# Patient Record
Sex: FEMALE | Race: WHITE | NOT HISPANIC OR LATINO | Employment: OTHER | URBAN - METROPOLITAN AREA
[De-identification: names, ages, dates, MRNs, and addresses within clinical notes are randomized per-mention and may not be internally consistent; named-entity substitution may affect disease eponyms.]

---

## 2020-05-13 ENCOUNTER — TELEPHONE (OUTPATIENT)
Dept: OTHER | Facility: OTHER | Age: 70
End: 2020-05-13

## 2020-07-08 ENCOUNTER — NURSING HOME VISIT (OUTPATIENT)
Dept: FAMILY MEDICINE CLINIC | Facility: CLINIC | Age: 70
End: 2020-07-08
Payer: MEDICARE

## 2020-07-08 DIAGNOSIS — D50.9 IRON DEFICIENCY ANEMIA, UNSPECIFIED IRON DEFICIENCY ANEMIA TYPE: ICD-10-CM

## 2020-07-08 DIAGNOSIS — E53.8 VITAMIN B12 DEFICIENCY: ICD-10-CM

## 2020-07-08 DIAGNOSIS — L03.119 RECURRENT CELLULITIS OF LOWER EXTREMITY: ICD-10-CM

## 2020-07-08 DIAGNOSIS — E55.9 VITAMIN D DEFICIENCY: ICD-10-CM

## 2020-07-08 DIAGNOSIS — L97.929 CHRONIC VENOUS HYPERTENSION (IDIOPATHIC) WITH ULCER OF BILATERAL LOWER EXTREMITY (HCC): ICD-10-CM

## 2020-07-08 DIAGNOSIS — L97.919 CHRONIC VENOUS HYPERTENSION (IDIOPATHIC) WITH ULCER OF BILATERAL LOWER EXTREMITY (HCC): ICD-10-CM

## 2020-07-08 DIAGNOSIS — F41.9 ANXIETY: ICD-10-CM

## 2020-07-08 DIAGNOSIS — R60.9 EDEMA, UNSPECIFIED TYPE: ICD-10-CM

## 2020-07-08 DIAGNOSIS — I87.313 CHRONIC VENOUS HYPERTENSION (IDIOPATHIC) WITH ULCER OF BILATERAL LOWER EXTREMITY (HCC): ICD-10-CM

## 2020-07-08 DIAGNOSIS — F32.A DEPRESSION, UNSPECIFIED DEPRESSION TYPE: ICD-10-CM

## 2020-07-08 DIAGNOSIS — I95.0 IDIOPATHIC HYPOTENSION: ICD-10-CM

## 2020-07-08 DIAGNOSIS — A41.9 SEPSIS, DUE TO UNSPECIFIED ORGANISM, UNSPECIFIED WHETHER ACUTE ORGAN DYSFUNCTION PRESENT (HCC): Primary | ICD-10-CM

## 2020-07-08 PROCEDURE — 99316 NF DSCHRG MGMT 30 MIN+: CPT | Performed by: NURSE PRACTITIONER

## 2020-07-10 VITALS
HEART RATE: 70 BPM | RESPIRATION RATE: 18 BRPM | WEIGHT: 144 LBS | DIASTOLIC BLOOD PRESSURE: 74 MMHG | TEMPERATURE: 97.6 F | OXYGEN SATURATION: 96 % | SYSTOLIC BLOOD PRESSURE: 116 MMHG

## 2020-07-10 PROBLEM — I95.0 IDIOPATHIC HYPOTENSION: Status: ACTIVE | Noted: 2020-07-10

## 2020-07-10 PROBLEM — I83.029 VENOUS ULCER-LEG SYNDROME, BILATERAL (HCC): Status: ACTIVE | Noted: 2020-07-10

## 2020-07-10 PROBLEM — E87.6 HYPOKALEMIA: Status: ACTIVE | Noted: 2020-07-10

## 2020-07-10 PROBLEM — L03.119 RECURRENT CELLULITIS OF LOWER EXTREMITY: Status: ACTIVE | Noted: 2020-07-10

## 2020-07-10 PROBLEM — R60.9 EDEMA: Status: ACTIVE | Noted: 2020-07-10

## 2020-07-10 PROBLEM — A41.9 SEPSIS (HCC): Status: RESOLVED | Noted: 2020-07-10 | Resolved: 2020-07-10

## 2020-07-10 PROBLEM — I87.313 CHRONIC VENOUS HYPERTENSION (IDIOPATHIC) WITH ULCER OF BILATERAL LOWER EXTREMITY (HCC): Status: ACTIVE | Noted: 2020-07-10

## 2020-07-10 PROBLEM — A41.9 SEPSIS (HCC): Status: ACTIVE | Noted: 2020-07-10

## 2020-07-10 PROBLEM — D64.9 ANEMIA: Status: ACTIVE | Noted: 2020-07-10

## 2020-07-10 PROBLEM — E53.8 VITAMIN B12 DEFICIENCY: Status: ACTIVE | Noted: 2020-07-10

## 2020-07-10 PROBLEM — L97.929 VENOUS ULCER-LEG SYNDROME, BILATERAL (HCC): Status: ACTIVE | Noted: 2020-07-10

## 2020-07-10 PROBLEM — E55.9 VITAMIN D DEFICIENCY: Status: ACTIVE | Noted: 2020-07-10

## 2020-07-10 PROBLEM — E87.6 HYPOKALEMIA: Status: RESOLVED | Noted: 2020-07-10 | Resolved: 2020-07-10

## 2020-07-10 PROBLEM — F41.9 ANXIETY: Status: ACTIVE | Noted: 2020-07-10

## 2020-07-10 PROBLEM — I89.0 LYMPHEDEMA: Status: ACTIVE | Noted: 2020-07-10

## 2020-07-10 PROBLEM — L97.919 VENOUS ULCER-LEG SYNDROME, BILATERAL (HCC): Status: ACTIVE | Noted: 2020-07-10

## 2020-07-10 PROBLEM — I83.019 VENOUS ULCER-LEG SYNDROME, BILATERAL (HCC): Status: ACTIVE | Noted: 2020-07-10

## 2020-07-10 PROBLEM — L03.90 CELLULITIS: Status: ACTIVE | Noted: 2020-07-10

## 2020-07-10 PROBLEM — F32.A DEPRESSION: Status: ACTIVE | Noted: 2020-07-10

## 2020-07-10 NOTE — ASSESSMENT & PLAN NOTE
Patient remains on Lasix 40 mg in a m  And 20 mg in p m  in addition to potassium chloride 20 mEq b i d

## 2020-07-10 NOTE — ASSESSMENT & PLAN NOTE
Patient reports occasional dizziness  Counseled patient on getting up from bed and making sure she uses assistance  Patient will have nursing care at home    Continue midrodrine 10 mg t i d

## 2020-07-10 NOTE — PROGRESS NOTES
[facility] [address]  [phone]   Fax: [phone]     PROGRESS NOTE  Name: Deborah Zacarias  AGE: 68 y o  SEX: female    MRN: [id number]    DATE OF ENCOUNTER: 7/08/20    Patient Location   [address] and [address]  Reason For Visit     Discharge    Patients care was coordinated with nursing facility staff  Recent vitals, labs and [facility]  Past Medical, surgical, social, medication and allergy history and patients previous records reviewed  1  Sepsis, due to unspecified organism, unspecified whether acute organ dysfunction present [facility])  Assessment & Plan:  White blood cell on 06/16/2020 was 7 6  Vital signs are stable  Patient remains afebrile  2  Idiopathic hypotension  Assessment & Plan:  Patient reports occasional dizziness  Counseled patient on getting up from bed and making sure she uses assistance  Patient will have nursing care at home  Continue midrodrine 10 mg t i d       3  Depression, unspecified depression type  Assessment & Plan:  Patient in a pleasant mood today  No behavioral issues have been reported  Patient remains on Lexapro 10 mg daily  4  Iron deficiency anemia, unspecified iron deficiency anemia type  Assessment & Plan:  Last hemoglobin on 06/16/2020 was 8 3  Patient remains on ferrous sulfate 325 mg b i d  5  Anxiety  Assessment & Plan:  No behavioral issues reported  Patient remains on Ativan 0 5 mg t i d  And buspirone 7 5 mg t i d       6  Edema, unspecified type  Assessment & Plan:  Patient remains on Lasix 40 mg in a m  And 20 mg in p m  in addition to potassium chloride 20 mEq b i d       7  Vitamin D deficiency  Assessment & Plan:  Patient remains on cholecalciferol 1000 units 2 tablets daily        8  Vitamin B12 deficiency  Assessment & Plan:  Patient remains on vitamin B12 1000 mcg IM monthly      9  Recurrent cellulitis of lower extremity    10  Chronic venous hypertension (idiopathic) with ulcer of bilateral lower extremity (HCC)  Assessment & Plan:  Chronic condition  Wound care has been given in nursing facility  Continue wound care at home  HPI      Mrs Grecia Cevallos is a 68 y o  female who is being seen for discharge today  Patient was previously Dr Young Kaba but currently assigned to our practice today  Patient has a past medical history of recurrent cellulitis, lymphedema, aortic stenosis, anxiety, obesity status post gastric bypass, depression  As per staff, she has had many visits to the facility in the past   She had been previous hospitalized on 05/04/2020 and was treated for sepsis, cellulitis, hypokalemia, AMADOU  In hospital she was treated with antibiotics, fluid resuscitation, and 1 unit of blood  Patient was discharged to [facility] for rehabilitation  During stay in facility it was noted that she had hypokalemia for which Dr Young Kaba ordered potassium chloride with repeat labs  Repeat attached was normal   According to records patient has been receiving PT and OT daily and has progressed  On examination today she appears pleasant, and is  in acute no acute distress, and her vital signs are stable  She is going home today and is to follow-up with PCP, and vascular physician  Review of Systems   Constitutional: Positive for fatigue  Negative for chills and fever  HENT: Negative for nosebleeds and rhinorrhea  Eyes: Negative for discharge and redness  Respiratory: Negative for cough, chest tightness, shortness of breath, wheezing and stridor  Cardiovascular: Negative for chest pain and leg swelling  Gastrointestinal: Negative for abdominal distention, abdominal pain, diarrhea and vomiting  Genitourinary: Negative for dysuria, flank pain and hematuria  Musculoskeletal: Positive for gait problem  Negative for arthralgias and back pain  Skin: Negative for pallor  Neurological: Positive for weakness (Generalized)  Negative for tremors, seizures, syncope and headaches  Psychiatric/Behavioral: Negative for agitation, behavioral problems and confusion  Past Medical History:   Diagnosis Date    Anemia     Anxiety     Hypotension        No past surgical history on file  Social History     Social History     Tobacco Use   Smoking Status Former Smoker   Smokeless Tobacco Never Used       Social History     Substance and Sexual Activity   Alcohol Use Not on file        No family history on file  Allergies   Allergen Reactions    Aspirin     Cephalosporins      Has tolerated cefepime    Penicillins Other (See Comments)     Unknown reaction during childhood   Valium [Diazepam]     Ciprofloxacin Rash    Sulfa Antibiotics Rash     Itching, rash    Vancomycin Rash       Medications     No current facility-administered medications for this visit  No current outpatient medications on file      Facility-Administered Medications Ordered in Other Visits:     acetaminophen (TYLENOL) tablet 650 mg, 650 mg, Oral, Q6H PRN, Raegan Sky MD, 650 mg at 07/29/20 2136    buPROPion (WELLBUTRIN XL) 24 hr tablet 300 mg, 300 mg, Oral, Daily, Raegan Sky MD, 300 mg at 07/29/20 1810    busPIRone (BUSPAR) tablet 15 mg, 15 mg, Oral, BID, Raegan Sky MD, 15 mg at 07/30/20 0846    cefepime (MAXIPIME) IVPB (premix) 1,000 mg 50 mL, 1,000 mg, Intravenous, Q12H, Raegan Sky MD, Last Rate: 100 mL/hr at 07/30/20 0848, 1,000 mg at 07/30/20 0848    cholecalciferol (VITAMIN D3) tablet 2,000 Units, 2,000 Units, Oral, Daily, Raegan Sky MD, 2,000 Units at 07/30/20 0846    cyclobenzaprine (FLEXERIL) tablet 5 mg, 5 mg, Oral, TID PRN, Raegan Sky MD, 5 mg at 07/26/20 2159    diphenhydrAMINE (BENADRYL) injection 25 mg, 25 mg, Intravenous, Q6H PRN, Raegan Sky MD, 25 mg at 07/29/20 0626    escitalopram (LEXAPRO) tablet 10 mg, 10 mg, Oral, Daily, Shari Jimenez MD, 10 mg at 07/30/20 0846    ferrous sulfate tablet 325 mg, 325 mg, Oral, Daily With Breakfast, Shari Jimenez MD, 325 mg at 07/30/20 0846    heparin (porcine) subcutaneous injection 5,000 Units, 5,000 Units, Subcutaneous, Q8H [facility], 5,000 Units at 07/30/20 0533 **AND** Platelet count, , , Once, Shari Jimenez MD    lactated ringers infusion, 75 mL/hr, Intravenous, Continuous, Imani Beltrán MD, Last Rate: 75 mL/hr at 07/30/20 0155, 75 mL/hr at 07/30/20 0155    lidocaine (LIDODERM) 5 % patch 1 patch, 1 patch, Topical, Daily, Edgardo Maurer MD, 1 patch at 07/30/20 0847    midodrine (PROAMATINE) tablet 10 mg, 10 mg, Oral, TID AC, Shari Jimenez MD, 10 mg at 07/30/20 0846    multivitamin-minerals (CENTRUM) tablet 1 tablet, 1 tablet, Oral, Daily, Shari Jimenez MD, 1 tablet at 07/30/20 0846    oxyCODONE (ROXICODONE) IR tablet 5 mg, 5 mg, Oral, Q6H PRN, Imani Beltrán MD, 5 mg at 07/30/20 0847    pravastatin (PRAVACHOL) tablet 20 mg, 20 mg, Oral, Daily With Duy Christian MD, 20 mg at 07/29/20 1549    Updated list was reviewed in [address] [place] [address] facility  Vitals:    07/08/20 1046   BP: 116/74   Pulse: 70   Resp: 18   Temp: 97 6 °F (36 4 °C)   SpO2: 96%       Physical Exam   Constitutional: She is oriented to person, place, and time  She appears well-developed and well-nourished  No distress  HENT:   Head: Normocephalic and atraumatic  Eyes: Right eye exhibits no discharge  Left eye exhibits no discharge  No scleral icterus  Cardiovascular: Normal rate, regular rhythm and normal heart sounds  Pulmonary/Chest: Effort normal and breath sounds normal  No stridor  No respiratory distress  She has no wheezes  She has no rales  Abdominal: Soft  Bowel sounds are normal  She exhibits no distension  There is no tenderness  There is no guarding     Musculoskeletal: She exhibits no edema or deformity  Neurological: She is alert and oriented to person, place, and time  No focal neurological symptoms noted  Skin: Skin is warm and dry  No rash noted  She is not diaphoretic  Psychiatric: She has a normal mood and affect  Her behavior is normal  Judgment and thought content normal        Diagnostic Data     Recent labs were reviewed  · 07/08/2020 WBC 12, potassium 3 7      Additional Notes     · Care coordination with staff and family  Patient sent home with a medication box  · Greater than 35 minutes spent on assessing patient, reviewing records, care coordination with staff      This was electronically signed by ROSCOE Mcneal

## 2020-07-10 NOTE — ASSESSMENT & PLAN NOTE
Patient in a pleasant mood today  No behavioral issues have been reported  Patient remains on Lexapro 10 mg daily

## 2020-07-26 ENCOUNTER — HOSPITAL ENCOUNTER (INPATIENT)
Facility: HOSPITAL | Age: 70
LOS: 4 days | Discharge: NURSING, REHAB OR LONG-TERM CARE | DRG: 683 | End: 2020-07-30
Attending: EMERGENCY MEDICINE | Admitting: INTERNAL MEDICINE
Payer: MEDICARE

## 2020-07-26 DIAGNOSIS — L03.119 RECURRENT CELLULITIS OF LOWER EXTREMITY: ICD-10-CM

## 2020-07-26 DIAGNOSIS — L03.119 CELLULITIS OF LOWER EXTREMITY, UNSPECIFIED LATERALITY: Primary | ICD-10-CM

## 2020-07-26 DIAGNOSIS — F41.9 ANXIETY: ICD-10-CM

## 2020-07-26 DIAGNOSIS — F32.A DEPRESSION, UNSPECIFIED DEPRESSION TYPE: ICD-10-CM

## 2020-07-26 PROBLEM — N17.9 AKI (ACUTE KIDNEY INJURY) (HCC): Status: ACTIVE | Noted: 2020-07-26

## 2020-07-26 LAB
ANION GAP SERPL CALCULATED.3IONS-SCNC: 8 MMOL/L (ref 4–13)
APTT PPP: 31 SECONDS (ref 23–31)
BASOPHILS # BLD AUTO: 0.03 THOUSANDS/ΜL (ref 0–0.1)
BASOPHILS NFR BLD AUTO: 0 % (ref 0–1)
BNP SERPL-MCNC: 71.8 PG/ML (ref 1–100)
BUN SERPL-MCNC: 41 MG/DL (ref 6–20)
CALCIUM SERPL-MCNC: 9.3 MG/DL (ref 8.4–10.2)
CHLORIDE SERPL-SCNC: 106 MMOL/L (ref 96–108)
CO2 SERPL-SCNC: 22 MMOL/L (ref 22–33)
CREAT SERPL-MCNC: 1.39 MG/DL (ref 0.4–1.1)
EOSINOPHIL # BLD AUTO: 0.31 THOUSAND/ΜL (ref 0–0.61)
EOSINOPHIL NFR BLD AUTO: 2 % (ref 0–6)
ERYTHROCYTE [DISTWIDTH] IN BLOOD BY AUTOMATED COUNT: 14.5 % (ref 11.6–15.1)
GFR SERPL CREATININE-BSD FRML MDRD: 38 ML/MIN/1.73SQ M
GLUCOSE SERPL-MCNC: 79 MG/DL (ref 65–140)
HCT VFR BLD AUTO: 28.6 % (ref 34.8–46.1)
HGB BLD-MCNC: 9 G/DL (ref 11.5–15.4)
IMM GRANULOCYTES # BLD AUTO: 0.03 THOUSAND/UL (ref 0–0.2)
IMM GRANULOCYTES NFR BLD AUTO: 0 % (ref 0–2)
INR PPP: 0.97 (ref 0.9–1.1)
LYMPHOCYTES # BLD AUTO: 2.32 THOUSANDS/ΜL (ref 0.6–4.47)
LYMPHOCYTES NFR BLD AUTO: 18 % (ref 14–44)
MCH RBC QN AUTO: 28.2 PG (ref 26.8–34.3)
MCHC RBC AUTO-ENTMCNC: 31.5 G/DL (ref 31.4–37.4)
MCV RBC AUTO: 90 FL (ref 82–98)
MONOCYTES # BLD AUTO: 0.73 THOUSAND/ΜL (ref 0.17–1.22)
MONOCYTES NFR BLD AUTO: 6 % (ref 4–12)
NEUTROPHILS # BLD AUTO: 9.38 THOUSANDS/ΜL (ref 1.85–7.62)
NEUTS SEG NFR BLD AUTO: 74 % (ref 43–75)
PLATELET # BLD AUTO: 346 THOUSANDS/UL (ref 149–390)
PMV BLD AUTO: 10.3 FL (ref 8.9–12.7)
POTASSIUM SERPL-SCNC: 4 MMOL/L (ref 3.5–5)
PROTHROMBIN TIME: 10.3 SECONDS (ref 9.5–12.1)
RBC # BLD AUTO: 3.19 MILLION/UL (ref 3.81–5.12)
SODIUM SERPL-SCNC: 136 MMOL/L (ref 133–145)
TROPONIN I SERPL-MCNC: <0.03 NG/ML (ref 0–0.07)
WBC # BLD AUTO: 12.8 THOUSAND/UL (ref 4.31–10.16)

## 2020-07-26 PROCEDURE — 85610 PROTHROMBIN TIME: CPT | Performed by: EMERGENCY MEDICINE

## 2020-07-26 PROCEDURE — 85025 COMPLETE CBC W/AUTO DIFF WBC: CPT | Performed by: EMERGENCY MEDICINE

## 2020-07-26 PROCEDURE — 99284 EMERGENCY DEPT VISIT MOD MDM: CPT | Performed by: EMERGENCY MEDICINE

## 2020-07-26 PROCEDURE — 99223 1ST HOSP IP/OBS HIGH 75: CPT | Performed by: INTERNAL MEDICINE

## 2020-07-26 PROCEDURE — 84484 ASSAY OF TROPONIN QUANT: CPT | Performed by: EMERGENCY MEDICINE

## 2020-07-26 PROCEDURE — 85730 THROMBOPLASTIN TIME PARTIAL: CPT | Performed by: EMERGENCY MEDICINE

## 2020-07-26 PROCEDURE — 1124F ACP DISCUSS-NO DSCNMKR DOCD: CPT | Performed by: INTERNAL MEDICINE

## 2020-07-26 PROCEDURE — 87040 BLOOD CULTURE FOR BACTERIA: CPT | Performed by: EMERGENCY MEDICINE

## 2020-07-26 PROCEDURE — 99284 EMERGENCY DEPT VISIT MOD MDM: CPT

## 2020-07-26 PROCEDURE — 83880 ASSAY OF NATRIURETIC PEPTIDE: CPT | Performed by: EMERGENCY MEDICINE

## 2020-07-26 PROCEDURE — 36415 COLL VENOUS BLD VENIPUNCTURE: CPT | Performed by: EMERGENCY MEDICINE

## 2020-07-26 PROCEDURE — 80048 BASIC METABOLIC PNL TOTAL CA: CPT | Performed by: EMERGENCY MEDICINE

## 2020-07-26 RX ORDER — CYCLOBENZAPRINE HCL 10 MG
5 TABLET ORAL 3 TIMES DAILY PRN
Status: DISCONTINUED | OUTPATIENT
Start: 2020-07-26 | End: 2020-07-30 | Stop reason: HOSPADM

## 2020-07-26 RX ORDER — BUSPIRONE HYDROCHLORIDE 5 MG/1
15 TABLET ORAL 2 TIMES DAILY
Status: DISCONTINUED | OUTPATIENT
Start: 2020-07-26 | End: 2020-07-30 | Stop reason: HOSPADM

## 2020-07-26 RX ORDER — DIPHENOXYLATE HYDROCHLORIDE AND ATROPINE SULFATE 2.5; .025 MG/1; MG/1
1 TABLET ORAL DAILY
COMMUNITY

## 2020-07-26 RX ORDER — FUROSEMIDE 20 MG/1
20 TABLET ORAL
COMMUNITY
End: 2020-07-30 | Stop reason: HOSPADM

## 2020-07-26 RX ORDER — FERROUS SULFATE 325(65) MG
650 TABLET ORAL
COMMUNITY
End: 2020-08-18 | Stop reason: SDUPTHER

## 2020-07-26 RX ORDER — HEPARIN SODIUM 5000 [USP'U]/ML
5000 INJECTION, SOLUTION INTRAVENOUS; SUBCUTANEOUS EVERY 8 HOURS SCHEDULED
Status: DISCONTINUED | OUTPATIENT
Start: 2020-07-26 | End: 2020-07-30 | Stop reason: HOSPADM

## 2020-07-26 RX ORDER — BUPROPION HYDROCHLORIDE 150 MG/1
150 TABLET, EXTENDED RELEASE ORAL 2 TIMES DAILY
Status: DISCONTINUED | OUTPATIENT
Start: 2020-07-26 | End: 2020-07-27 | Stop reason: CLARIF

## 2020-07-26 RX ORDER — FUROSEMIDE 40 MG/1
40 TABLET ORAL EVERY MORNING
COMMUNITY
End: 2020-07-30 | Stop reason: HOSPADM

## 2020-07-26 RX ORDER — BUPROPION HYDROCHLORIDE 150 MG/1
300 TABLET, EXTENDED RELEASE ORAL DAILY
COMMUNITY
End: 2020-08-18 | Stop reason: SDUPTHER

## 2020-07-26 RX ORDER — MELATONIN
2000 DAILY
Status: DISCONTINUED | OUTPATIENT
Start: 2020-07-27 | End: 2020-07-30 | Stop reason: HOSPADM

## 2020-07-26 RX ORDER — PRAVASTATIN SODIUM 20 MG
20 TABLET ORAL
Status: DISCONTINUED | OUTPATIENT
Start: 2020-07-26 | End: 2020-07-30 | Stop reason: HOSPADM

## 2020-07-26 RX ORDER — CYANOCOBALAMIN 1000 UG/ML
1000 INJECTION INTRAMUSCULAR; SUBCUTANEOUS ONCE
Status: COMPLETED | OUTPATIENT
Start: 2020-07-27 | End: 2020-07-26

## 2020-07-26 RX ORDER — ESCITALOPRAM OXALATE 10 MG/1
10 TABLET ORAL DAILY
COMMUNITY
End: 2020-08-18 | Stop reason: SDUPTHER

## 2020-07-26 RX ORDER — CYCLOBENZAPRINE HCL 5 MG
5 TABLET ORAL 3 TIMES DAILY PRN
COMMUNITY
End: 2020-08-18 | Stop reason: SDUPTHER

## 2020-07-26 RX ORDER — CLINDAMYCIN PHOSPHATE 600 MG/50ML
600 INJECTION INTRAVENOUS ONCE
Status: COMPLETED | OUTPATIENT
Start: 2020-07-26 | End: 2020-07-26

## 2020-07-26 RX ORDER — FERROUS SULFATE 325(65) MG
325 TABLET ORAL
Status: DISCONTINUED | OUTPATIENT
Start: 2020-07-27 | End: 2020-07-30 | Stop reason: HOSPADM

## 2020-07-26 RX ORDER — LOVASTATIN 20 MG/1
20 TABLET ORAL
COMMUNITY
End: 2020-07-31

## 2020-07-26 RX ORDER — METRONIDAZOLE 500 MG/1
500 TABLET ORAL EVERY 8 HOURS SCHEDULED
Status: DISCONTINUED | OUTPATIENT
Start: 2020-07-26 | End: 2020-07-28

## 2020-07-26 RX ORDER — BUSPIRONE HYDROCHLORIDE 15 MG/1
15 TABLET ORAL 3 TIMES DAILY
COMMUNITY
End: 2020-08-18 | Stop reason: SDUPTHER

## 2020-07-26 RX ORDER — MIDODRINE HYDROCHLORIDE 5 MG/1
10 TABLET ORAL
Status: DISCONTINUED | OUTPATIENT
Start: 2020-07-26 | End: 2020-07-30 | Stop reason: HOSPADM

## 2020-07-26 RX ORDER — LIDOCAINE 50 MG/G
2 PATCH TOPICAL ONCE
Status: COMPLETED | OUTPATIENT
Start: 2020-07-26 | End: 2020-07-27

## 2020-07-26 RX ORDER — CEFEPIME HYDROCHLORIDE 1 G/50ML
1000 INJECTION, SOLUTION INTRAVENOUS EVERY 12 HOURS
Status: DISCONTINUED | OUTPATIENT
Start: 2020-07-26 | End: 2020-07-30 | Stop reason: HOSPADM

## 2020-07-26 RX ORDER — SODIUM CHLORIDE, SODIUM LACTATE, POTASSIUM CHLORIDE, CALCIUM CHLORIDE 600; 310; 30; 20 MG/100ML; MG/100ML; MG/100ML; MG/100ML
75 INJECTION, SOLUTION INTRAVENOUS CONTINUOUS
Status: DISCONTINUED | OUTPATIENT
Start: 2020-07-26 | End: 2020-07-30 | Stop reason: HOSPADM

## 2020-07-26 RX ORDER — ESCITALOPRAM OXALATE 10 MG/1
10 TABLET ORAL DAILY
Status: DISCONTINUED | OUTPATIENT
Start: 2020-07-27 | End: 2020-07-30 | Stop reason: HOSPADM

## 2020-07-26 RX ORDER — DIPHENHYDRAMINE HYDROCHLORIDE 50 MG/ML
25 INJECTION INTRAMUSCULAR; INTRAVENOUS EVERY 6 HOURS PRN
Status: DISCONTINUED | OUTPATIENT
Start: 2020-07-26 | End: 2020-07-30 | Stop reason: HOSPADM

## 2020-07-26 RX ORDER — MELATONIN
1000 DAILY
COMMUNITY

## 2020-07-26 RX ORDER — OXYCODONE HYDROCHLORIDE 5 MG/1
5 TABLET ORAL EVERY 6 HOURS PRN
Status: DISCONTINUED | OUTPATIENT
Start: 2020-07-26 | End: 2020-07-30 | Stop reason: HOSPADM

## 2020-07-26 RX ORDER — CEFEPIME HYDROCHLORIDE 2 G/50ML
2000 INJECTION, SOLUTION INTRAVENOUS EVERY 12 HOURS
Status: DISCONTINUED | OUTPATIENT
Start: 2020-07-26 | End: 2020-07-26

## 2020-07-26 RX ORDER — MIDODRINE HYDROCHLORIDE 10 MG/1
TABLET ORAL
COMMUNITY
End: 2020-08-18 | Stop reason: SDUPTHER

## 2020-07-26 RX ORDER — ACETAMINOPHEN 325 MG/1
650 TABLET ORAL EVERY 6 HOURS PRN
Status: DISCONTINUED | OUTPATIENT
Start: 2020-07-26 | End: 2020-07-30 | Stop reason: HOSPADM

## 2020-07-26 RX ADMIN — CLINDAMYCIN IN 5 PERCENT DEXTROSE 600 MG: 12 INJECTION, SOLUTION INTRAVENOUS at 17:38

## 2020-07-26 RX ADMIN — PRAVASTATIN SODIUM 20 MG: 20 TABLET ORAL at 20:04

## 2020-07-26 RX ADMIN — MIDODRINE HYDROCHLORIDE 10 MG: 5 TABLET ORAL at 20:04

## 2020-07-26 RX ADMIN — HEPARIN SODIUM 5000 UNITS: 5000 INJECTION INTRAVENOUS; SUBCUTANEOUS at 21:33

## 2020-07-26 RX ADMIN — BUSPIRONE HYDROCHLORIDE 15 MG: 5 TABLET ORAL at 20:04

## 2020-07-26 RX ADMIN — BUPROPION HYDROCHLORIDE 150 MG: 150 TABLET, FILM COATED, EXTENDED RELEASE ORAL at 22:48

## 2020-07-26 RX ADMIN — SODIUM CHLORIDE, SODIUM LACTATE, POTASSIUM CHLORIDE, AND CALCIUM CHLORIDE 125 ML/HR: .6; .31; .03; .02 INJECTION, SOLUTION INTRAVENOUS at 20:03

## 2020-07-26 RX ADMIN — OXYCODONE HYDROCHLORIDE 5 MG: 5 TABLET ORAL at 22:07

## 2020-07-26 RX ADMIN — CEFEPIME HYDROCHLORIDE 1000 MG: 1 INJECTION, SOLUTION INTRAVENOUS at 20:03

## 2020-07-26 RX ADMIN — CYANOCOBALAMIN 1000 MCG: 1000 INJECTION, SOLUTION INTRAMUSCULAR; SUBCUTANEOUS at 23:36

## 2020-07-26 RX ADMIN — CYCLOBENZAPRINE HYDROCHLORIDE 5 MG: 10 TABLET, FILM COATED ORAL at 21:59

## 2020-07-26 RX ADMIN — METRONIDAZOLE 500 MG: 500 TABLET, FILM COATED ORAL at 21:33

## 2020-07-26 RX ADMIN — LIDOCAINE 2 PATCH: 50 PATCH TOPICAL at 17:35

## 2020-07-26 NOTE — ED PROVIDER NOTES
History  Chief Complaint   Patient presents with    Cellulitis     per EMS, pt visiting nurse sent her to ER for eval of possible cellulitis; pt was recently d/c from [facility]     This is a 70-year-old female presents to the emergency department complaining of cellulitis to her lower extremities  The patient has a history of chronic lymphedema and bilateral lower extremity cellulitis  The patient states that her visiting nurse who comes twice a week stated that her lower legs need to be evaluated  She states that they are more swollen by about 25% and that the redness has increased on her legs and the top of her feet  The patient states this has been a slow process over the last 3 months since she was discharged  She states that she has had no fevers or chills  She states that the wounds are weeping more  She denies increasing pain to the bilateral lower extremities  She denies chest pain or trouble breathing  She denies abdominal pain  She denies nausea or vomiting  Prior to Admission Medications   Prescriptions Last Dose Informant Patient Reported? Taking?    Cyanocobalamin (B-12 COMPLIANCE INJECTION IJ)   Yes Yes   Sig: Inject as directed   Potassium Chloride (KLOR-CON 10 PO)   Yes Yes   Sig: Take by mouth 3 (three) times a day   buPROPion (ZYBAN) 150 MG 12 hr tablet   Yes Yes   Sig: Take 150 mg by mouth 2 (two) times a day   busPIRone (BUSPAR) 15 mg tablet   Yes Yes   Sig: Take 15 mg by mouth 3 (three) times a day   cholecalciferol (VITAMIN D3) 1,000 units tablet   Yes Yes   Sig: Take 2,000 Units by mouth daily   cyclobenzaprine (FLEXERIL) 5 mg tablet   Yes Yes   Sig: Take 5 mg by mouth 3 (three) times a day as needed for muscle spasms   escitalopram (LEXAPRO) 10 mg tablet   Yes Yes   Sig: Take 10 mg by mouth daily   ferrous sulfate 325 (65 Fe) mg tablet   Yes Yes   Sig: Take 325 mg by mouth daily with breakfast   furosemide (LASIX) 20 mg tablet   Yes Yes   Sig: Take 20 mg by mouth daily at bedtime   furosemide (LASIX) 40 mg tablet   Yes Yes   Sig: Take 40 mg by mouth every morning   lovastatin (MEVACOR) 20 mg tablet   Yes Yes   Sig: Take 20 mg by mouth daily at bedtime   metoprolol tartrate (LOPRESSOR) 25 mg tablet   Yes Yes   Sig: metoprolol tartrate 25 mg tablet   TAKE ONE-HALF TABLET (12 5MG) BY MOUTH TWO TIMES DAILY (HOLD FOR SBP LESS THAN 110)   midodrine (PROAMATINE) 10 MG tablet   Yes No   Sig: midodrine 10 mg tablet   TAKE 1 TABLET BY MOUTH THREE TIMES A DAY (HOLD FOR SBP ABOVE 130)   multivitamin (THERAGRAN) TABS   Yes Yes   Sig: Take 1 tablet by mouth daily      Facility-Administered Medications: None       Past Medical History:   Diagnosis Date    Anemia     Anxiety     Hypotension        No past surgical history on file  No family history on file  I have reviewed and agree with the history as documented  E-Cigarette/Vaping     E-Cigarette/Vaping Substances     Social History     Tobacco Use    Smoking status: Not on file   Substance Use Topics    Alcohol use: Not on file    Drug use: Not on file       Review of Systems   All other systems reviewed and are negative        Physical Exam  Physical Exam  Constitutional:  Vital signs reviewed, patient appears nontoxic, no acute distress  Eyes: Pupils equal round reactive to light and accommodation, extraocular muscles intact  HEENT: trachea midline, no JVD, moist mucous membranes  Respiratory: lung sounds clear throughout, no rhonchi, no rales  Cardiovascular: regular rate rhythm, no murmurs or rubs  Abdomen: soft, nontender, nondistended, no rebound or guarding  Back: no CVA tenderness, normal inspection  Extremities:  Bilateral lower extremity nonpitting edema to the knees, pulses equal in all 4 extremities, erythema to the mid thigh, weeping wounds on the bilateral lower extremities  Neuro: awake, alert, oriented, no focal weakness  Skin: warm, see extremity exam above    Vital Signs  ED Triage Vitals   Temperature Pulse Respirations Blood Pressure SpO2   07/26/20 1508 07/26/20 1507 07/26/20 1507 07/26/20 1508 07/26/20 1507   98 °F (36 7 °C) 76 20 (!) 94/38 100 %      Temp Source Heart Rate Source Patient Position - Orthostatic VS BP Location FiO2 (%)   07/26/20 1508 07/26/20 1507 -- -- --   Tympanic Monitor         Pain Score       --                  Vitals:    07/26/20 1507 07/26/20 1508   BP:  (!) 94/38   Pulse: 76          Visual Acuity      ED Medications  Medications   clindamycin (CLEOCIN) IVPB (premix) 600 mg 50 mL (600 mg Intravenous New Bag 7/26/20 1738)   lidocaine (LIDODERM) 5 % patch 2 patch (2 patches Topical Medication Applied 7/26/20 1735)       Diagnostic Studies  Results Reviewed     Procedure Component Value Units Date/Time    Blood culture #1 [[id number]] Collected:  07/26/20 1700    Lab Status: In process Specimen:  Blood from Arm, Left Updated:  07/26/20 1743    Blood culture #2 [[id number]] Collected:  07/26/20 1700    Lab Status:   In process Specimen:  Blood from Arm, Left Updated:  07/26/20 1742    Protime-INR [[id number]]  (Normal) Collected:  07/26/20 1640    Lab Status:  Final result Specimen:  Blood from Arm, Left Updated:  07/26/20 1732     Protime 10 3 seconds      INR 0 97    Narrative:       INR Reference Ranges:  No Anticoagulant, Normal:           0 9-1 1  Standard Dose, Oral Anticoagulant:  2 0-3 0  High Dose, Oral Anticoagulant:      2 5-3 5    APTT [[id number]]  (Normal) Collected:  07/26/20 1640    Lab Status:  Final result Specimen:  Blood from Arm, Left Updated:  07/26/20 1732     PTT 31 seconds     B-Type Natriuretic Peptide ([address]) [[id number]]  (Normal) Collected:  07/26/20 1640    Lab Status:  Final result Specimen:  Blood from Arm, Left Updated:  07/26/20 1729     BNP 71 8 pg/mL     Basic metabolic panel [[id number]]  (Abnormal) Collected:  07/26/20 1640    Lab Status:  Final result Specimen:  Blood from Arm, Left Updated:  07/26/20 1419     Sodium 136 mmol/L      Potassium 4 0 mmol/L      Chloride 106 mmol/L      CO2 22 mmol/L      ANION GAP 8 mmol/L      BUN 41 mg/dL      Creatinine 1 39 mg/dL      Glucose 79 mg/dL      Calcium 9 3 mg/dL      eGFR 38 ml/min/1 73sq m     Narrative:       [place] guidelines for Chronic Kidney Disease (CKD):     Stage 1 with normal or high GFR (GFR > 90 mL/min/1 73 square meters)    Stage 2 Mild CKD (GFR = 60-89 mL/min/1 73 square meters)    Stage 3A Moderate CKD (GFR = 45-59 mL/min/1 73 square meters)    Stage 3B Moderate CKD (GFR = 30-44 mL/min/1 73 square meters)    Stage 4 Severe CKD (GFR = 15-29 mL/min/1 73 square meters)    Stage 5 End Stage CKD (GFR <15 mL/min/1 73 square meters)  Note: GFR calculation is accurate only with a steady state creatinine    Troponin I [[id number]]  (Normal) Collected:  07/26/20 1640    Lab Status:  Final result Specimen:  Blood from Arm, Left Updated:  07/26/20 1729     Troponin I <0 03 ng/mL     CBC and differential [[id number]]  (Abnormal) Collected:  07/26/20 1640    Lab Status:  Final result Specimen:  Blood from Arm, Left Updated:  07/26/20 1648     WBC 12 80 Thousand/uL      RBC 3 19 Million/uL      Hemoglobin 9 0 g/dL      Hematocrit 28 6 %      MCV 90 fL      MCH 28 2 pg      MCHC 31 5 g/dL      RDW 14 5 %      MPV 10 3 fL      Platelets 910 Thousands/uL      Neutrophils Relative 74 %      Immat GRANS % 0 %      Lymphocytes Relative 18 %      Monocytes Relative 6 %      Eosinophils Relative 2 %      Basophils Relative 0 %      Neutrophils Absolute 9 38 Thousands/µL      Immature Grans Absolute 0 03 Thousand/uL      Lymphocytes Absolute 2 32 Thousands/µL      Monocytes Absolute 0 73 Thousand/µL      Eosinophils Absolute 0 31 Thousand/µL      Basophils Absolute 0 03 Thousands/µL                  No orders to display              Procedures  Procedures         ED Course  ED Course as of Jul 26 1752   Sun Jul 26, 2020   1728 This is a 26-year-old female presented to the emergency department complaining of lower leg swelling and redness  The patient had exam is significant for bilateral lower extremity edema and erythema  Both legs were weeping significantly  The patient had a white count of 12 8  The patient had a a hemoglobin of 9  The patient was admitted for further care and evaluation  As per the patient, and prior records, the patient normally has a blood pressure of 90s/40s  I do not feel that adding more fluid the patient would be beneficial       6339 Patient had an ultrasound guided 18 gauge IV placed in the left upper arm                                                MDM  Number of Diagnoses or Management Options  Diagnosis management comments: This is a 68-year-old female presented to the emergency department complaining of lower extremity redness and swelling  I considered CHF exacerbation, chronic lymphedema, cellulitis  These and other diagnoses were considered  Disposition  Final diagnoses:   Cellulitis of lower extremity, unspecified laterality     Time reflects when diagnosis was documented in both MDM as applicable and the Disposition within this note     Time User Action Codes Description Comment    7/26/2020  4:02 PM Charles Almeida Add [L03 [id number]] Cellulitis of lower extremity, unspecified laterality       ED Disposition     ED Disposition Condition Date/Time Comment    Admit Stable Sun Jul 26, 2020  4:02 PM Case was discussed with Dr Brenda Lara and the patient's admission status was agreed to be Admission Status: inpatient status to the service of Dr Brenda Laar   Follow-up Information    None         Patient's Medications   Discharge Prescriptions    No medications on file     No discharge procedures on file      PDMP Review     None          ED Provider  Electronically Signed by           Didier Farr DO  07/26/20 2395

## 2020-07-27 ENCOUNTER — APPOINTMENT (INPATIENT)
Dept: RADIOLOGY | Facility: HOSPITAL | Age: 70
DRG: 683 | End: 2020-07-27
Payer: MEDICARE

## 2020-07-27 LAB
ANION GAP SERPL CALCULATED.3IONS-SCNC: 7 MMOL/L (ref 4–13)
BASOPHILS # BLD AUTO: 0.04 THOUSANDS/ΜL (ref 0–0.1)
BASOPHILS NFR BLD AUTO: 0 % (ref 0–1)
BUN SERPL-MCNC: 33 MG/DL (ref 6–20)
CALCIUM SERPL-MCNC: 8.6 MG/DL (ref 8.4–10.2)
CHLORIDE SERPL-SCNC: 109 MMOL/L (ref 96–108)
CO2 SERPL-SCNC: 23 MMOL/L (ref 22–33)
CREAT SERPL-MCNC: 1.13 MG/DL (ref 0.4–1.1)
EOSINOPHIL # BLD AUTO: 0.17 THOUSAND/ΜL (ref 0–0.61)
EOSINOPHIL NFR BLD AUTO: 1 % (ref 0–6)
ERYTHROCYTE [DISTWIDTH] IN BLOOD BY AUTOMATED COUNT: 15.6 % (ref 11.6–15.1)
GFR SERPL CREATININE-BSD FRML MDRD: 49 ML/MIN/1.73SQ M
GLUCOSE SERPL-MCNC: 125 MG/DL (ref 65–140)
HCT VFR BLD AUTO: 26.3 % (ref 34.8–46.1)
HGB BLD-MCNC: 8.3 G/DL (ref 11.5–15.4)
IMM GRANULOCYTES # BLD AUTO: 0.03 THOUSAND/UL (ref 0–0.2)
IMM GRANULOCYTES NFR BLD AUTO: 0 % (ref 0–2)
LYMPHOCYTES # BLD AUTO: 1.53 THOUSANDS/ΜL (ref 0.6–4.47)
LYMPHOCYTES NFR BLD AUTO: 13 % (ref 14–44)
MCH RBC QN AUTO: 28.4 PG (ref 26.8–34.3)
MCHC RBC AUTO-ENTMCNC: 31.6 G/DL (ref 31.4–37.4)
MCV RBC AUTO: 90 FL (ref 82–98)
MONOCYTES # BLD AUTO: 0.79 THOUSAND/ΜL (ref 0.17–1.22)
MONOCYTES NFR BLD AUTO: 7 % (ref 4–12)
NEUTROPHILS # BLD AUTO: 9.4 THOUSANDS/ΜL (ref 1.85–7.62)
NEUTS SEG NFR BLD AUTO: 79 % (ref 43–75)
PLATELET # BLD AUTO: 202 THOUSANDS/UL (ref 149–390)
PMV BLD AUTO: 11.5 FL (ref 8.9–12.7)
POTASSIUM SERPL-SCNC: 3.7 MMOL/L (ref 3.5–5)
RBC # BLD AUTO: 2.92 MILLION/UL (ref 3.81–5.12)
SODIUM SERPL-SCNC: 139 MMOL/L (ref 133–145)
WBC # BLD AUTO: 11.96 THOUSAND/UL (ref 4.31–10.16)

## 2020-07-27 PROCEDURE — 73560 X-RAY EXAM OF KNEE 1 OR 2: CPT

## 2020-07-27 PROCEDURE — 85025 COMPLETE CBC W/AUTO DIFF WBC: CPT | Performed by: INTERNAL MEDICINE

## 2020-07-27 PROCEDURE — 97530 THERAPEUTIC ACTIVITIES: CPT

## 2020-07-27 PROCEDURE — 80048 BASIC METABOLIC PNL TOTAL CA: CPT | Performed by: INTERNAL MEDICINE

## 2020-07-27 PROCEDURE — 87081 CULTURE SCREEN ONLY: CPT | Performed by: INTERNAL MEDICINE

## 2020-07-27 PROCEDURE — 87186 SC STD MICRODIL/AGAR DIL: CPT | Performed by: INTERNAL MEDICINE

## 2020-07-27 PROCEDURE — 87205 SMEAR GRAM STAIN: CPT | Performed by: INTERNAL MEDICINE

## 2020-07-27 PROCEDURE — 97167 OT EVAL HIGH COMPLEX 60 MIN: CPT

## 2020-07-27 PROCEDURE — 99233 SBSQ HOSP IP/OBS HIGH 50: CPT | Performed by: INTERNAL MEDICINE

## 2020-07-27 PROCEDURE — 97163 PT EVAL HIGH COMPLEX 45 MIN: CPT

## 2020-07-27 PROCEDURE — 87077 CULTURE AEROBIC IDENTIFY: CPT | Performed by: INTERNAL MEDICINE

## 2020-07-27 PROCEDURE — 87070 CULTURE OTHR SPECIMN AEROBIC: CPT | Performed by: INTERNAL MEDICINE

## 2020-07-27 RX ORDER — BUPROPION HYDROCHLORIDE 150 MG/1
300 TABLET ORAL DAILY
Status: DISCONTINUED | OUTPATIENT
Start: 2020-07-27 | End: 2020-07-30 | Stop reason: HOSPADM

## 2020-07-27 RX ADMIN — OXYCODONE HYDROCHLORIDE 5 MG: 5 TABLET ORAL at 06:30

## 2020-07-27 RX ADMIN — FERROUS SULFATE TAB 325 MG (65 MG ELEMENTAL FE) 325 MG: 325 (65 FE) TAB at 09:38

## 2020-07-27 RX ADMIN — MELATONIN 2000 UNITS: at 09:38

## 2020-07-27 RX ADMIN — HEPARIN SODIUM 5000 UNITS: 5000 INJECTION INTRAVENOUS; SUBCUTANEOUS at 06:24

## 2020-07-27 RX ADMIN — BUPROPION HYDROCHLORIDE 300 MG: 150 TABLET, EXTENDED RELEASE ORAL at 18:00

## 2020-07-27 RX ADMIN — METRONIDAZOLE 500 MG: 500 TABLET, FILM COATED ORAL at 15:31

## 2020-07-27 RX ADMIN — MIDODRINE HYDROCHLORIDE 10 MG: 5 TABLET ORAL at 17:15

## 2020-07-27 RX ADMIN — MIDODRINE HYDROCHLORIDE 10 MG: 5 TABLET ORAL at 06:25

## 2020-07-27 RX ADMIN — METRONIDAZOLE 500 MG: 500 TABLET, FILM COATED ORAL at 06:25

## 2020-07-27 RX ADMIN — BUSPIRONE HYDROCHLORIDE 15 MG: 5 TABLET ORAL at 09:38

## 2020-07-27 RX ADMIN — ACETAMINOPHEN 650 MG: 325 TABLET, FILM COATED ORAL at 20:30

## 2020-07-27 RX ADMIN — METRONIDAZOLE 500 MG: 500 TABLET, FILM COATED ORAL at 21:21

## 2020-07-27 RX ADMIN — SODIUM CHLORIDE, SODIUM LACTATE, POTASSIUM CHLORIDE, AND CALCIUM CHLORIDE 250 ML: .6; .31; .03; .02 INJECTION, SOLUTION INTRAVENOUS at 21:22

## 2020-07-27 RX ADMIN — HEPARIN SODIUM 5000 UNITS: 5000 INJECTION INTRAVENOUS; SUBCUTANEOUS at 21:22

## 2020-07-27 RX ADMIN — Medication 1 TABLET: at 09:38

## 2020-07-27 RX ADMIN — ESCITALOPRAM OXALATE 10 MG: 10 TABLET ORAL at 09:38

## 2020-07-27 RX ADMIN — BUPROPION HYDROCHLORIDE 150 MG: 150 TABLET, FILM COATED, EXTENDED RELEASE ORAL at 09:38

## 2020-07-27 RX ADMIN — PRAVASTATIN SODIUM 20 MG: 20 TABLET ORAL at 17:15

## 2020-07-27 RX ADMIN — CEFEPIME HYDROCHLORIDE 1000 MG: 1 INJECTION, SOLUTION INTRAVENOUS at 09:39

## 2020-07-27 RX ADMIN — HEPARIN SODIUM 5000 UNITS: 5000 INJECTION INTRAVENOUS; SUBCUTANEOUS at 15:31

## 2020-07-27 RX ADMIN — CEFEPIME HYDROCHLORIDE 1000 MG: 1 INJECTION, SOLUTION INTRAVENOUS at 21:23

## 2020-07-27 RX ADMIN — BUSPIRONE HYDROCHLORIDE 15 MG: 5 TABLET ORAL at 17:15

## 2020-07-27 RX ADMIN — MIDODRINE HYDROCHLORIDE 10 MG: 5 TABLET ORAL at 11:30

## 2020-07-27 RX ADMIN — ACETAMINOPHEN 650 MG: 325 TABLET, FILM COATED ORAL at 11:30

## 2020-07-27 NOTE — PHYSICAL THERAPY NOTE
PHYSICAL THERAPY EVALUATION  TIME: 9636-1903    NAME:  Harrison Buitrago  DATE: 07/27/20    AGE:   68 y o  Mrn:   [id number]  Length Of Stay: 1    ADMIT DX:  Cellulitis [L03 90]  Cellulitis of lower extremity, unspecified laterality [X76 978]    Past Medical History:   Diagnosis Date    Anemia     Anxiety     Hypotension      No past surgical history on file  Performed at least 2 patient identifiers during session: Name, Celina Chong and ID bracelet         07/27/20 7398   Note Type   Note type Eval/Treat   Pain Assessment   Pain Assessment Tool 0-10   Pain Score 8   Pain Location/Orientation Orientation: Right;Location: Knee   Effect of Pain on Daily Activities Limits activity tolerance, limits standing/WBing, induces anxiety   Patient's Stated Pain Goal No pain   Hospital Pain Intervention(s) Medication (See MAR); Ambulation/increased activity; Elevated   Home Living   Type of [address] [place] [address] One level;Ramped entrance  (ramp + 1 CONCETTA the home, [facility] )   Bathroom Shower/Tub Walk-in shower   [facility]; Wheelchair-manual;Other (Comment)  (Sit to stand recliner chair is ordered, not yet received)   Additional Comments pt reports that her sister comes over daily to assist with ADLs  toilets in depends regularly  Sponge bathes  Ambulates household distances with RW (only with home PT), does not ambulate otherwise  Prior Function   Level of [place] Needs assistance with IADLs; Needs assistance with ADLs and functional mobility   Lives With Alone   Receives Help From Family;Home health;Friend(s)   ADL Assistance Needs assistance   IADLs Needs assistance   Falls in the last 6 months 0  (pt denies )   Vocational Retired   Comments Pt's sister comes over daily, has home health PT and VNA  Restrictions/Precautions   Weight Bearing Precautions Per Order No   Other Precautions Bed Alarm; Chair Alarm;Multiple lines;Telemetry; Fall Risk;Pain   General   Family/Caregiver Present No Cognition   Overall Cognitive Status WFL   Arousal/Participation Alert   Orientation Level Oriented to person;Oriented to place;Oriented to situation   Memory Within functional limits   Following Commands Follows one step commands without difficulty   RLE Assessment   RLE Assessment X   RLE Overall AROM   R Hip Flexion Limited due to pain and weakness   R Knee Flexion Limited due to pain and weakness   R Knee Extension Limited due to pain and weakness   R Ankle Dorsiflexion Limited due to pain and weakness   Strength RLE   RLE Overall Strength 3/5  (observed functionally )   LLE Assessment   LLE Assessment X   LLE Overall AROM   L Hip Flexion Limited due to pain and weakness   L Knee Flexion Limited due to pain and weakness   L Knee Extension Limited due to pain and weakness   L Ankle Dorsiflexion Limited due to pain and weakness   Strength LLE   LLE Overall Strength 3/5  (observed functionally )   Coordination   Sensation WFL   Light Touch   RLE Light Touch Grossly intact   LLE Light Touch Grossly intact   Bed Mobility   Supine to Sit 2  Maximal assistance   Additional items Assist x 2;HOB elevated; Bedrails; Increased time required;Verbal cues;LE management   Sit to Supine Unable to assess   Additional Comments Pt was left seated in bedside recliner chair at end of session  Balance   Static Sitting Fair   Dynamic Sitting Poor +   Static Standing Fair   Dynamic Standing Poor +   Ambulatory Poor +   Endurance Deficit   Endurance Deficit Yes   Endurance Deficit Description Limited by pain    Activity Tolerance   Activity Tolerance Patient limited by fatigue;Patient limited by pain   Medical Staff Made Aware yes, spoke with Dr Juan C Galindo yes, spoke with GEO Pena    Assessment   Prognosis Fair   Problem List Decreased strength;Decreased range of motion;Decreased endurance; Impaired balance;Decreased mobility; Decreased safety awareness;Decreased skin integrity;Pain   Assessment Pt seen for PT evaluation, after clearance by GEO Auguste, with fall precautions  Pt admitted on 7/26/2020 sent by home VNA due to worsening LE swelling, drainage, and redness, and dx of Recurrent cellulitis of lower extremity  Comorbidities affecting pt's functional performance include: anemia, anxiety, depression and obesity, hypotension, chronic LE edema and wounds, lymphedema, gastric bypass surgery, aortic stenosis  Personal factors affecting patient at time of initial evaluation include: inaccessible home environment, inability to ambulate household distances, inability to navigate level surfaces without external assistance, limited home support, anxiety, depression, inability to perform physical activity, inability to perform ADLS, inability to perform IADLS  and inability to live alone  Prior to admission, patient was requiring assist for functional mobility with use of RW, requiring assist for ADLS, requiring assist for IADLS, living alone in a single  story home with ramp + 1 steps to enter and ambulating household distance only with home physical therapist (does not ambulate without home PT)  The Barthel Index was used as a functional outcome tool presenting with a score of 25 indicating severe limitations of functional mobility and ADLS  Pt is at risk of falls due to multiple comorbidities, history of previous falls, impaired balance, impaired insight/safety awareness, acuity of medical illness, use of assistive device and varying levels of pain   Pt's clinical presentation is currently unstable/unpredictable as seen in patient's presentation of changing level of pain, increased fall risk, new onset of impairment of functional mobility, decreased endurance and new onset of weakness   This PT evaluation requires clinical decision making of high complexity, based on multiple medical/physiological/functional factors which affect pt's performance with evaluation and therapist judgement for future treatment sessions (if applicable)  Pt would benefit from continued PT treatment in order to address above impairments, decrease risk of falls, maximize independence with functional mobility, and ensure safety with mobility for transition to next level of care  Based on pt presentation and impairments, pt would most appropriately benefit from post acute STR upon d/c  Pt was left seated in bedside recliner chair, needs within reach and fall precautions in place, care returned to RN  Barriers to Discharge Inaccessible home environment;Decreased caregiver support   Goals   Patient Goals "i know it's smart for me to go to a rehab if I can  I can't go home like this "    STG Expiration Date 08/10/20   Short Term Goal #1 1  Pt will complete all bed mobility in hospital bed with [facility] 1 person, in order to promote OOB mobility and decrease burden of care  2  Pt will complete all transfers with [facility] 1 person and use of RW, to increase ambulation ability  3  Pt will ambulate >25ft with RW and GALEN 1 person  4  Pt will tolerate >3hrs OOB in recliner chair with good endurance evidenced by no significant change in vitals  5  Pt will improve Barthel Index score to >/= 35/100 in order to increase independence and decrease burden of care  6  PT to assess elevations/ramp negotiation as able and appropriate, for pt to enter her home  7  Pt will demonstrate independence with LE HEP for strengthening, improved circulation, and edema management  PT Treatment Day 1  (See below for treatment note)   Plan   Treatment/Interventions Functional transfer training;LE strengthening/ROM; Elevations; Therapeutic exercise; Endurance training;Bed mobility;Gait training;Spoke to MD;Spoke to nursing;Spoke to case management;OT   PT Frequency 5x/wk   Recommendation   PT Discharge Recommendation Post-Acute Rehabilitation Services   Equipment Recommended Walker   Modified [place] Scale   Modified [place]oy Scale 4   Barthel Index   Feeding 10   Bathing 0   Grooming Score 0 Dressing Score 5   Bladder Score 0   Bowels Score 5   Toilet Use Score 0   Transfers (Bed/Chair) Score 5   Mobility (Level Surface) Score 0   Stairs Score 0   Barthel Index Score 25           PHYSICAL THERAPY TREATMENT NOTE:    TIME IN: 1350  TIME OUT: 1418  TOTAL TREATMENT TIME: 28 minutes        S: "I don't think I can even stand on my leg "     O: Pt tolerated EOB sitting ~2 minutes with fair balance, required B UE support  Pt then transfers STS from EOB with MODA 2 person and use of RW  Poor standing balance, requiring 2 person assist and increased cues for body mechanics  Pt completes bed>chair stand to step transfer with RW and MODA 2 person, amb ~2ft during transfer  Pt with significant difficulty advancing B LEs (L >R as pt unable to fully WB on R LE due to pain)  Pt was left seated in bedside recliner chair with B LEs elevated, needs within reach, fall mat and chair alarm engaged  A: Regarding functional mobility, pt continues to present below her baseline level of functional mobility  Pt requires further extensive rehab services due to pain, decreased strength, edema, impaired balance, severe ambulatory dysfunction, and decrease activity tolerance  P: Recommend post acute STR once medically cleared for d/c from the acute care setting  Will continue skilled PT POC as able to address above impairments and ensure safety with mobility for d/c to next level of care       Zonia Thomas, PT, DPT  7/27/2020

## 2020-07-27 NOTE — ASSESSMENT & PLAN NOTE
On presentation creatinine-1 39  Creatinine trend seems to be improving    Continue IV fluids with LR at 75 cc/hour

## 2020-07-27 NOTE — PLAN OF CARE
Problem: Potential for Falls  Goal: Patient will remain free of falls  Description  INTERVENTIONS:  - Assess patient frequently for physical needs  -  Identify cognitive and physical deficits and behaviors that affect risk of falls  -  [place] fall precautions as indicated by assessment   - Educate patient/family on patient safety including physical limitations  - Instruct patient to call for assistance with activity based on assessment  - Modify environment to reduce risk of injury  - Consider OT/PT consult to assist with strengthening/mobility  Outcome: Progressing     Problem: Prexisting or High Potential for Compromised Skin Integrity  Goal: Skin integrity is maintained or improved  Description  INTERVENTIONS:  - Identify patients at risk for skin breakdown  - Assess and monitor skin integrity  - Assess and monitor nutrition and hydration status  - Monitor labs   - Assess for incontinence   - Turn and reposition patient  - Assist with mobility/ambulation  - Relieve pressure over bony prominences  - Avoid friction and shearing  - Provide appropriate hygiene as needed including keeping skin clean and dry  - Evaluate need for skin moisturizer/barrier cream  - Collaborate with interdisciplinary team   - Patient/family teaching  - Consider wound care consult   Outcome: Progressing     Problem: Nutrition/Hydration-ADULT  Goal: Nutrient/Hydration intake appropriate for improving, restoring or maintaining nutritional needs  Description  Monitor and assess patient's nutrition/hydration status for malnutrition  Collaborate with interdisciplinary team and initiate plan and interventions as ordered  Monitor patient's weight and dietary intake as ordered or per policy  Utilize nutrition screening tool and intervene as necessary  Determine patient's food preferences and provide high-protein, high-caloric foods as appropriate       INTERVENTIONS:  - Monitor oral intake, urinary output, labs, and treatment plans  - Assess nutrition and hydration status and recommend course of action  - Evaluate amount of meals eaten  - Assist patient with eating if necessary   - Allow adequate time for meals  - Recommend/ encourage appropriate diets, oral nutritional supplements, and vitamin/mineral supplements  - Order, calculate, and assess calorie counts as needed  - Recommend, monitor, and adjust tube feedings and TPN/PPN based on assessed needs  - Assess need for intravenous fluids  - Provide specific nutrition/hydration education as appropriate  - Include patient/family/caregiver in decisions related to nutrition  Outcome: Progressing     Problem: INFECTION - ADULT  Goal: Absence or prevention of progression during hospitalization  Description  INTERVENTIONS:  - Assess and monitor for signs and symptoms of infection  - Monitor lab/diagnostic results  - Monitor all insertion sites, i e  indwelling lines, tubes, and drains  - Monitor endotracheal if appropriate and nasal secretions for changes in amount and color  - [place] appropriate cooling/warming therapies per order  - Administer medications as ordered  - Instruct and encourage patient and family to use good hand hygiene technique  - Identify and instruct in appropriate isolation precautions for identified infection/condition  Outcome: Progressing

## 2020-07-27 NOTE — PROGRESS NOTES
Progress Note - Tracey Cassette 1950, 68 y o  female MRN: [id number]    Unit/Bed#: -01 Encounter: [id number]    Primary Care Provider: Darryl Fraser MD   Date and time admitted to hospital: 7/26/2020  3:03 PM        * Recurrent cellulitis of lower extremity  Assessment & Plan  Patient presents with worsening swelling and erythema of bilateral lower extremities  Serous discharge from bilateral lower extremities  Patient started on cefepime and Flagyl  Blood cultures have been ordered  MRSA screen ordered  Wound cultures ordered  Patient is afebrile since admission, although patient was hypotensive, responded to fluids  Continue midodrine  Previous wound cultures positive for Pseudomonas  Continue cefepime and Flagyl-day 2    AMADOU (acute kidney injury) ([facility] [address] )  Assessment & Plan  On presentation creatinine-1 39  Creatinine trend seems to be improving  Continue IV fluids with LR at 75 cc/hour    Lymphedema  Assessment & Plan  Chronic lymphedema bilateral lower extremities  Currently Lasix on hold due to hypotension  Will resume when able    Vitamin D deficiency  Assessment & Plan  Continue vitamin-D supplementation    Edema  Assessment & Plan  Daily dose of Lasix on hold due to hypotension  Will resume when able    Depression  Assessment & Plan  Continue escitalopram and bupropion    Idiopathic hypotension  Assessment & Plan  Continue midodrine 10 mg t i d  Anxiety  Assessment & Plan  Continue buspirone and escitalopram    ----------------------------------------------------------------------------------------  HPI/24hr events:  No significant overnight events reported    Code Status: Level 1 - Full Code  ---------------------------------------------------------------------------------------  SUBJECTIVE  Patient seen at bedside today morning  Patient appears in no acute apparent distress, patient is alert and oriented to time place and person    Patient complains of pain in her right knee reports trauma to the right knee in nursing home 3 weeks prior  Patient reports adequate pain control with pain medications  Review of Systems   Respiratory: Negative for apnea, cough, choking, shortness of breath and stridor  Cardiovascular: Positive for leg swelling  Negative for chest pain and palpitations  Gastrointestinal: Negative for abdominal distention and abdominal pain  Neurological: Negative for dizziness and facial asymmetry  Hematological: Negative for adenopathy  Psychiatric/Behavioral: Negative for agitation  Review of systems was reviewed and negative unless stated above in HPI/24-hour events   ---------------------------------------------------------------------------------------  OBJECTIVE    Vitals   Vitals:    20 2326 20 0408 20 0623 20 0815   BP: 105/60 96/51 99/56 109/67   BP Location: Right arm Right arm Right arm Right arm   Pulse: 70 72 87 84   Resp: 16 16 16 16   Temp: (!) 96 4 °F (35 8 °C) 97 8 °F (36 6 °C)  98 9 °F (37 2 °C)   TempSrc: Tympanic Tympanic  Tympanic   SpO2: 98% 99% 98% 98%   Weight:       Height:         Temp (24hrs), Av 6 °F (36 4 °C), Min:96 4 °F (35 8 °C), Max:98 9 °F (37 2 °C)  Current: Temperature: 98 9 °F (37 2 °C)          Respiratory:  SpO2: SpO2: 98 %       Invasive/non-invasive ventilation settings   Respiratory    Lab Data (Last 4 hours)    None         O2/Vent Data (Last 4 hours)    None                Physical Exam   Constitutional: She is oriented to person, place, and time  She appears well-developed and well-nourished  HENT:   Head: Normocephalic and atraumatic  Eyes: Pupils are equal, round, and reactive to light  EOM are normal    Neck: Normal range of motion  Neck supple  Cardiovascular: Normal rate, regular rhythm and normal heart sounds  Pulmonary/Chest: Effort normal and breath sounds normal    Abdominal: Soft  Bowel sounds are normal  She exhibits no distension  There is no tenderness   There is no guarding  Musculoskeletal:   On examination-bilateral lower extremities appear to be edematous and erythematous  Serous discharge apparent  On palpation, mildly tender to palpation  Ecchymotic spots noted over right thigh and right knee, range of motion painful for right knee   Neurological: She is alert and oriented to person, place, and time  She displays normal reflexes  No cranial nerve deficit or sensory deficit  She exhibits normal muscle tone  Coordination normal    Skin: Skin is warm  Laboratory and Diagnostics:  Results from last 7 days   Lab Units 07/27/20  0505 07/26/20  1640   WBC Thousand/uL 11 96* 12 80*   HEMOGLOBIN g/dL 8 3* 9 0*   HEMATOCRIT % 26 3* 28 6*   PLATELETS Thousands/uL 202 346   NEUTROS PCT % 79* 74   MONOS PCT % 7 6     Results from last 7 days   Lab Units 07/27/20  1030 07/26/20  1640   SODIUM mmol/L 139 136   POTASSIUM mmol/L 3 7 4 0   CHLORIDE mmol/L 109* 106   CO2 mmol/L 23 22   ANION GAP mmol/L 7 8   BUN mg/dL 33* 41*   CREATININE mg/dL 1 13* 1 39*   CALCIUM mg/dL 8 6 9 3   GLUCOSE RANDOM mg/dL 125 79          Results from last 7 days   Lab Units 07/26/20  1640   INR  0 97   PTT seconds 31      Results from last 7 days   Lab Units 07/26/20  1640   TROPONIN I ng/mL <0 03         ABG:    VBG:          Micro  Results from last 7 days   Lab Units 07/26/20  1700   BLOOD CULTURE  Received in Microbiology Lab  Culture in Progress  Received in Microbiology Lab  Culture in Progress  Imaging: I have personally reviewed pertinent reports  Intake and Output  I/O       07/25 0701 - 07/26 0700 07/26 0701 - 07/27 0700 07/27 0701 - 07/28 0700    P  O   350     Total Intake(mL/kg)  350 (5 2)     Net  +350            Unmeasured Urine Occurrence  1 x     Unmeasured Stool Occurrence  0 x           Height and Weights   Height: 4' 11" (149 9 cm)  IBW: 43 2 kg  Body mass index is 29 89 kg/m²    Weight (last 2 days)     Date/Time   Weight    07/26/20 2103   67 1 (148) Nutrition       Diet Orders   (From admission, onward)             Start     Ordered    07/26/20 2003  Diet Cardiovascular; Sodium 2 GM  Diet effective now     Question Answer Comment   Diet Type Cardiovascular    Cardiac Sodium 2 GM    RD to adjust diet per protocol?  Yes        07/26/20 2002                  Active Medications  Scheduled Meds:    Current Facility-Administered Medications:  acetaminophen 650 mg Oral Q6H PRN Gayathri Francis MD    buPROPion 150 mg Oral BID Gayathri Francis MD    busPIRone 15 mg Oral BID Gayathri Francis MD    cefepime 1,000 mg Intravenous Q12H Gayathri Francis MD Last Rate: 1,000 mg (07/27/20 2506)   cholecalciferol 2,000 Units Oral Daily Gayathri Francis MD    cyclobenzaprine 5 mg Oral TID PRN Gayathri Francis MD    diphenhydrAMINE 25 mg Intravenous Q6H PRN Gayathri Francis MD    escitalopram 10 mg Oral Daily Gayathri Francis MD    ferrous sulfate 325 mg Oral Daily With Breakfast Gayathri Francis MD    heparin (porcine) 5,000 Units Subcutaneous Q8H [address] Gayathri Francis MD    lactated ringers 75 mL/hr Intravenous Continuous Ana Quach MD Last Rate: 75 mL/hr (07/27/20 3869)   metroNIDAZOLE 500 mg Oral Q8H Nadeem Andrew MD    midodrine 10 mg Oral TID AC Gayathri Francis MD    multivitamin-minerals 1 tablet Oral Daily Gayathri Francis MD    oxyCODONE 5 mg Oral Q6H PRN Ana Quach MD    pravastatin 20 mg Oral Daily With Clara Wood MD      Continuous Infusions:    lactated ringers 75 mL/hr Last Rate: 75 mL/hr (07/27/20 0208)     PRN Meds:     acetaminophen 650 mg Q6H PRN   cyclobenzaprine 5 mg TID PRN   diphenhydrAMINE 25 mg Q6H PRN   oxyCODONE 5 mg Q6H PRN       Invasive Devices Review  Invasive Devices     Peripheral Intravenous Line            Peripheral IV 07/26/20 Left Antecubital 1 day                Rationale for remaining devices: Antibiotics  ---------------------------------------------------------------------------------------  Advance Directive and Living Will:     PATIENT IS A FULL CODE  ---------------------------------------------------------------------------------------        Donato Chapman MD      Portions of the record may have been created with voice recognition software  Occasional wrong word or "sound a like" substitutions may have occurred due to the inherent limitations of voice recognition software    Read the chart carefully and recognize, using context, where substitutions have occurred

## 2020-07-27 NOTE — PLAN OF CARE
Problem: Potential for Falls  Goal: Patient will remain free of falls  Description  INTERVENTIONS:  - Assess patient frequently for physical needs  -  Identify cognitive and physical deficits and behaviors that affect risk of falls  -  [place] fall precautions as indicated by assessment   - Educate patient/family on patient safety including physical limitations  - Instruct patient to call for assistance with activity based on assessment  - Modify environment to reduce risk of injury  - Consider OT/PT consult to assist with strengthening/mobility  Outcome: Progressing     Problem: Prexisting or High Potential for Compromised Skin Integrity  Goal: Skin integrity is maintained or improved  Description  INTERVENTIONS:  - Identify patients at risk for skin breakdown  - Assess and monitor skin integrity  - Assess and monitor nutrition and hydration status  - Monitor labs   - Assess for incontinence   - Turn and reposition patient  - Assist with mobility/ambulation  - Relieve pressure over bony prominences  - Avoid friction and shearing  - Provide appropriate hygiene as needed including keeping skin clean and dry  - Evaluate need for skin moisturizer/barrier cream  - Collaborate with interdisciplinary team   - Patient/family teaching  - Consider wound care consult   Outcome: Not Progressing     Problem: Nutrition/Hydration-ADULT  Goal: Nutrient/Hydration intake appropriate for improving, restoring or maintaining nutritional needs  Description  Monitor and assess patient's nutrition/hydration status for malnutrition  Collaborate with interdisciplinary team and initiate plan and interventions as ordered  Monitor patient's weight and dietary intake as ordered or per policy  Utilize nutrition screening tool and intervene as necessary  Determine patient's food preferences and provide high-protein, high-caloric foods as appropriate       INTERVENTIONS:  - Monitor oral intake, urinary output, labs, and treatment plans  - Assess nutrition and hydration status and recommend course of action  - Evaluate amount of meals eaten  - Assist patient with eating if necessary   - Allow adequate time for meals  - Recommend/ encourage appropriate diets, oral nutritional supplements, and vitamin/mineral supplements  - Order, calculate, and assess calorie counts as needed  - Recommend, monitor, and adjust tube feedings and TPN/PPN based on assessed needs  - Assess need for intravenous fluids  - Provide specific nutrition/hydration education as appropriate  - Include patient/family/caregiver in decisions related to nutrition  Outcome: Progressing     Problem: INFECTION - ADULT  Goal: Absence or prevention of progression during hospitalization  Description  INTERVENTIONS:  - Assess and monitor for signs and symptoms of infection  - Monitor lab/diagnostic results  - Monitor all insertion sites, i e  indwelling lines, tubes, and drains  - Monitor endotracheal if appropriate and nasal secretions for changes in amount and color  - [place] appropriate cooling/warming therapies per order  - Administer medications as ordered  - Instruct and encourage patient and family to use good hand hygiene technique  - Identify and instruct in appropriate isolation precautions for identified infection/condition  Outcome: Progressing  Goal: Absence of fever/infection during neutropenic period  Description  INTERVENTIONS:  - Monitor WBC    Outcome: Not Progressing

## 2020-07-27 NOTE — PLAN OF CARE
Problem: PHYSICAL THERAPY ADULT  Goal: Performs mobility at highest level of function for planned discharge setting  See evaluation for individualized goals  Outcome: Progressing  Note:   Prognosis: Fair  Problem List: Decreased strength, Decreased range of motion, Decreased endurance, Impaired balance, Decreased mobility, Decreased safety awareness, Decreased skin integrity, Pain  Assessment: Pt seen for PT evaluation, after clearance by GEO [place], with fall precautions  Pt admitted on 7/26/2020 sent by home VNA due to worsening LE swelling, drainage, and redness, and dx of Recurrent cellulitis of lower extremity  Comorbidities affecting pt's functional performance include: anemia, anxiety, depression and obesity, hypotension, chronic LE edema and wounds, lymphedema, gastric bypass surgery, aortic stenosis  Personal factors affecting patient at time of initial evaluation include: inaccessible home environment, inability to ambulate household distances, inability to navigate level surfaces without external assistance, limited home support, anxiety, depression, inability to perform physical activity, inability to perform ADLS, inability to perform IADLS  and inability to live alone  Prior to admission, patient was requiring assist for functional mobility with use of RW, requiring assist for ADLS, requiring assist for IADLS, living alone in a single  story home with ramp + 1 steps to enter and ambulating household distance only with home physical therapist (does not ambulate without home PT)  The Barthel Index was used as a functional outcome tool presenting with a score of 25 indicating severe limitations of functional mobility and ADLS  Pt is at risk of falls due to multiple comorbidities, history of previous falls, impaired balance, impaired insight/safety awareness, acuity of medical illness, use of assistive device and varying levels of pain    Pt's clinical presentation is currently unstable/unpredictable as seen in patient's presentation of changing level of pain, increased fall risk, new onset of impairment of functional mobility, decreased endurance and new onset of weakness  This PT evaluation requires clinical decision making of high complexity, based on multiple medical/physiological/functional factors which affect pt's performance with evaluation and therapist judgement for future treatment sessions (if applicable)  Pt would benefit from continued PT treatment in order to address above impairments, decrease risk of falls, maximize independence with functional mobility, and ensure safety with mobility for transition to next level of care  Based on pt presentation and impairments, pt would most appropriately benefit from post acute STR upon d/c  Pt was left seated in bedside recliner chair, needs within reach and fall precautions in place, care returned to RN  Barriers to Discharge: Inaccessible home environment, Decreased caregiver support     PT Discharge Recommendation: [id number]          See flowsheet documentation for full assessment

## 2020-07-27 NOTE — H&P
H&P- Andrew Thrasher 1950, 68 y o  female MRN: [id number]    Unit/Bed#: -01 Encounter: [id number]    Primary Care Provider: Tejal Nunez MD   Date and time admitted to hospital: 7/26/2020  3:03 PM        * Recurrent cellulitis of lower extremity  Assessment & Plan  · Presents with worsening of redness and swelling of chronic lower extremity edema with chronic induration and weeping wounds  · WBC count 12 8, no fever  · Working diagnosis is cellulitis  · Wound culture of May 2020 grew Pseudomonas aeruginosa, Klebsiella pneumo, strep agalactiae, Proteus mirabilis, and diphtheroids  · Based on prior cultures and sensitivities, will start patient empirically on cefepime and Flagyl  Will consult ID  BCx were send  · Patient's qSOFA Score is 1 (hypotension)  Clinically patient does not appear septic at this time  She has chronic hypotension and did not take her regular dose of midodrine today  We will defer initial fluid bolus and start patient on 125 ml/h LR  Patient has high risk of decompensation due to severe aortic stenosis (MELIA 0 7 cm) and we will watch her fluid status closely and adjust IV fluids as needed  AMADOU (acute kidney injury) ([facility] [address] )  Assessment & Plan  · Creatinine on admission 1 39  Baseline from chart review seems to be 0 8 - 0 9  · Likely prerenal secondary to infection and poor oral intake  Patient is also on diuretics at home  · Will check 1x PVR to rule out significant retention  Will start on IVF with  ml/h and monitor creatinine in morning BMP  Idiopathic hypotension  Assessment & Plan  · Will continue home dose of midodrine 10 mg TID  · Will start IVF with  ml/h  Target MAP of 60-65  Edema  Assessment & Plan  · Will hold home Lasix because of hypotension and AMADOU  Monitor when to resume       Anxiety  Assessment & Plan  · Continue buspirone and Lexapro    Anemia  Assessment & Plan  · Patient has history of chronic iron deficiency anemia on iron supplementation  · Hb on admission 9 0 which per chart review is around patient's baseline  No signs of active bleeding  · Continue iron supplementation and monitor Hb  Depression  Assessment & Plan  · Continue buspirone and Lexapro    Vitamin D deficiency  Assessment & Plan  · Continue vitamin D3 supplement and multi-vitamin      VTE Prophylaxis: Heparin  / sequential compression device   Code Status: Full code    Anticipated Length of Stay:  Patient will be admitted on an Inpatient basis with an anticipated length of stay of  > 2 midnights  Justification for Hospital Stay: Cellulitis and AMADOU  Total Time for Visit, including Counseling / Coordination of Care: 45 minutes  Greater than 50% of this total time spent on direct patient counseling and coordination of care  Chief Complaint: Increased redness and swelling of bilateral lower extremities  History of Present Illness:    Joslyn Dong is a 68 y o  female with past medical history significant for chronic lymphedema with recurrent cellulitis, severe aortic stenosis (MELIA 0 7 cm on echo of 1/24/20), anxiety/depression, and s/p gastric bypass surgery, who presents with increased redness and swelling of both lower extremities  Patient reports that her visiting nurse who always takes care of her chronic lower extremity wounds told her today that she thought that her legs look worse, more red and more swollen, and that she recommended her to go to the ED to rule out cellulitis  Patient herself states that she did not notice a significant difference and denies fever/chills or increased pain  Patient was recently admitted to [facility] in May 2020 for septic shock secondary to cellulitis  Patient required pressor support and continuous renal replacement therapy for AMADOU at the time  [facility] cultures grew pseudomonas, kleb pneumo, group B strep, proteus and diphteroids  Patient completed course of cefepime and Flagyl   After discharge from the hospital she stayed at [place], [place], for 90 days and was just released from there 2-3 weeks ago  She lives alone but gets help with ADLs from her sister who visits daily and a friend and has a visiting nurse who takes care of her chronic lower extremity wounds  She currently denies fever/chills, changes in appetite, headache, dizziness, chest pain, palpitations, cough, shortness of breath, abdominal pain, nausea/vomiting/constipation/diarrhea, urinary symptoms  Review of Systems:    Review of Systems   Constitutional: Negative for chills, diaphoresis and fever  HENT: Negative for congestion and sore throat  Eyes: Negative for visual disturbance  Respiratory: Positive for shortness of breath (when she gets anxious)  Negative for cough, chest tightness and wheezing  Cardiovascular: Positive for leg swelling  Negative for chest pain and palpitations  Gastrointestinal: Positive for abdominal pain (before BM)  Negative for constipation, diarrhea, nausea and vomiting  Genitourinary: Negative for dysuria  Musculoskeletal: Negative for arthralgias and joint swelling  Skin: Positive for wound  Neurological: Negative for dizziness, syncope and headaches  Psychiatric/Behavioral: The patient is nervous/anxious  Past Medical and Surgical History:     Past Medical History:   Diagnosis Date    Anemia     Anxiety     Hypotension        No past surgical history on file  Meds/Allergies:    Prior to Admission medications    Medication Sig Start Date End Date Taking?  Authorizing Provider   buPROPion (ZYBAN) 150 MG 12 hr tablet Take 150 mg by mouth 2 (two) times a day   Yes Historical Provider, MD   busPIRone (BUSPAR) 15 mg tablet Take 15 mg by mouth 3 (three) times a day   Yes Historical Provider, MD   cholecalciferol (VITAMIN D3) 1,000 units tablet Take 2,000 Units by mouth daily   Yes Historical Provider, MD   Cyanocobalamin (B-12 COMPLIANCE INJECTION IJ) Inject as directed   Yes Historical Provider, MD   escitalopram (LEXAPRO) 10 mg tablet Take 10 mg by mouth daily   Yes Historical Provider, MD   ferrous sulfate 325 (65 Fe) mg tablet Take 325 mg by mouth daily with breakfast   Yes Historical Provider, MD   furosemide (LASIX) 20 mg tablet Take 20 mg by mouth daily at bedtime   Yes Historical Provider, MD   furosemide (LASIX) 40 mg tablet Take 40 mg by mouth every morning   Yes Historical Provider, MD   lovastatin (MEVACOR) 20 mg tablet Take 20 mg by mouth daily at bedtime   Yes Historical Provider, MD   metoprolol tartrate (LOPRESSOR) 25 mg tablet metoprolol tartrate 25 mg tablet   TAKE ONE-HALF TABLET (12 5MG) BY MOUTH TWO TIMES DAILY (HOLD FOR SBP LESS THAN 110) 5/21/20  Yes Historical Provider, MD   midodrine (PROAMATINE) 10 MG tablet midodrine 10 mg tablet   TAKE 1 TABLET BY MOUTH THREE TIMES A DAY (HOLD FOR SBP ABOVE 130)   Yes Historical Provider, MD   multivitamin (THERAGRAN) TABS Take 1 tablet by mouth daily   Yes Historical Provider, MD   Potassium Chloride (KLOR-CON 10 PO) Take by mouth 3 (three) times a day   Yes Historical Provider, MD   cyclobenzaprine (FLEXERIL) 5 mg tablet Take 5 mg by mouth 3 (three) times a day as needed for muscle spasms    Historical Provider, MD     I have reviewed home medications with patient personally  Allergies: Allergies   Allergen Reactions    Aspirin     Cephalosporins     Ciprofloxacin     Sulfa Antibiotics     Valium [Diazepam]     Vancomycin        Social History:     Marital Status:    Patient Pre-hospital Living Situation: Lives alone in private house  Patient Pre-hospital Level of Mobility: Dependent for most ADLs  Primary care taker is sister  Substance Use History: Patient smoked occasionally in her 25s, but no significant smoking history, denies alcohol or illicit drug use     Social History     Substance and Sexual Activity   Alcohol Use Not on file     Social History     Tobacco Use   Smoking Status Former Smoker Smokeless Tobacco Never Used     Social History     Substance and Sexual Activity   Drug Use Not on file       Family History:    non-contributory    Physical Exam:     Vitals:   Blood Pressure: (!) 90/48 (07/26/20 2000)  Pulse: 73 (07/26/20 2000)  Temperature: 97 6 °F (36 4 °C) (07/26/20 2000)  Temp Source: Tympanic (07/26/20 2000)  Respirations: 18 (07/26/20 2000)  Height: 4' 11" (149 9 cm) (07/26/20 2103)  Weight - Scale: 67 1 kg (148 lb) (07/26/20 2103)  SpO2: 100 % (07/26/20 2000)    Physical Exam   Constitutional: She is oriented to person, place, and time  She is active  She appears ill (Chronically ill appearing, but no acute distress  )  HENT:   Head: Normocephalic and atraumatic  Mouth/Throat: Oropharynx is clear and moist    Eyes: Pupils are equal, round, and reactive to light  Conjunctivae and EOM are normal    Neck: Normal range of motion  Neck supple  Cardiovascular: Regular rhythm  Bradycardia present  Exam reveals decreased pulses (Unable to find lower extremity pulses due to severe edema  Tried with doppler but unsuccesful  )  Murmur heard  Crescendo decrescendo systolic murmur is present with a grade of 4/6  Pulmonary/Chest: Effort normal and breath sounds normal    Abdominal: Soft  Bowel sounds are normal  She exhibits no distension  There is no tenderness  Musculoskeletal: Normal range of motion  She exhibits edema (2+ b/l lower extremity edema up to the knees  Appears chronic with induration  Dressings applied over areas with sloughed off skin  )  Neurological: She is alert and oriented to person, place, and time  Skin: Skin is warm and dry  There is erythema (Over both lower extremities)  Psychiatric: Her behavior is normal  Judgment and thought content normal  Her mood appears anxious  Her affect is labile (Tearful when talking about her medical history)  Additional Data:     Lab Results: I have personally reviewed pertinent reports        Results from last 7 days Lab Units 07/26/20  1640   WBC Thousand/uL 12 80*   HEMOGLOBIN g/dL 9 0*   HEMATOCRIT % 28 6*   PLATELETS Thousands/uL 346   NEUTROS PCT % 74   LYMPHS PCT % 18   MONOS PCT % 6   EOS PCT % 2     Results from last 7 days   Lab Units 07/26/20  1640   SODIUM mmol/L 136   POTASSIUM mmol/L 4 0   CHLORIDE mmol/L 106   CO2 mmol/L 22   BUN mg/dL 41*   CREATININE mg/dL 1 39*   ANION GAP mmol/L 8   CALCIUM mg/dL 9 3   GLUCOSE RANDOM mg/dL 79     Results from last 7 days   Lab Units 07/26/20  1640   INR  0 97                   Imaging: I have personally reviewed pertinent reports  No orders to display       EKG, Pathology, and Other Studies Reviewed on Admission:   · EKG: Sinus rhythm 65 bpm, aspecific ST-T changes  Allscripts / Epic Records Reviewed: Yes     ** Please Note: This note has been constructed using a voice recognition system   **

## 2020-07-27 NOTE — SOCIAL WORK
LOS: 1 GMLOS: 3 1    Pt is not a 30 day readmission or a bundle  Pt presented to the [facility] ED at the advisement of her visiting nurse through [facility]  Pt has PMHx of lymphedema and recurrent cellulitis of the b/l lower extremities  IVABX ordered and wound cultures pending  PT/OT consulted with recommendation for STR at discharge  SW attempted to meet with pt to compete assessment  Pt sleeping  Per nursing, pt was very tired and requested to not be bothered  SW to f/u tomorrow  [facility] advised that pt is open with SN/PT services  Reportedly, pt has been noncompliant at home with leg elevation  She will not allow the RNs to complete dressing changes  [facility] will not reaccept pt unless there is someone in the home that can be taught how to do dressings  KEYSHAWN will speak to be re this tomorrow  SW did speak with floor RN about VNA concern  Floor RN also stated that pt has not been allowing nurses to change her dressings either

## 2020-07-27 NOTE — ASSESSMENT & PLAN NOTE
· Creatinine on admission 1 39  Baseline from chart review seems to be 0 8 - 0 9  · Likely prerenal secondary to infection and poor oral intake  Patient is also on diuretics at home  · Will check 1x PVR to rule out significant retention  Will start on IVF with  ml/h and monitor creatinine in morning BMP

## 2020-07-27 NOTE — ASSESSMENT & PLAN NOTE
Patient presents with worsening swelling and erythema of bilateral lower extremities  Serous discharge from bilateral lower extremities  Patient started on cefepime and Flagyl  Blood cultures have been ordered  MRSA screen ordered  Wound cultures ordered  Patient is afebrile since admission, although patient was hypotensive, responded to fluids  Continue midodrine  Previous wound cultures positive for Pseudomonas    Continue cefepime and Flagyl-day 2

## 2020-07-27 NOTE — QUICK NOTE
[facility] Attending Physician Attestation Note - Admission    I have seen and examined Tracey Cifuentes personally and have reviewed the medical record independently  I have reviewed the case with the resident physician including all assessments and the plan of care for each  I agree with the resident physician and offer the following addendum to the below statements by the resident physician:     Date Evaluated: 7-26-20   Time Evaluated: 5pm and reevaluated at 7 pm with medical resident on call dr Karol Whitt / Asael Lawson is a 75-year-old female with past medical history of severe aortic stenosis with valve  area of less than 0 7 cm2, CHF with bilateral chronic lower extremity edema, hypotension on midodrine, questionable chronic kidney disease, obesity, anxiety and depression admitted with bilateral lower extremity cellulitis  Patient has had long history lyphemedema with cellulitis on chronic wound management  Patient was discharged from the nursing facility 3 weeks ago to home with wound care  This morning she was evaluated by the wound care nurse who noticed that there was increased redness and weeping of the lower extremity wounds  Patient states that the wound looks the same  Exam:   Vitals:    07/26/20 2000   BP: (!) 90/48   Pulse: 73   Resp: 18   Temp: 97 6 °F (36 4 °C)   SpO2: 100%     HEENT- dry oral mucosa noted  Decreased air entry at bases ,no rales or ronchi heard   Y5,Y2 heard ,systolic murmur heard    abdomen is is soft and nontender   B/l macerated wounds with ulcers with minimal discharge noted ,redness and swelling with nonpitting edema noted from below knees to ankles   CNS - no new neuro deficit noted   msk- no contractures ,    Assessment and Plan:  · B/l LE cellulitis with hypotension - will admit and give IV abx and IV fluids   The Patient is noted to have difficult IV acess,and started on fluid with 125cc/hr of LR and given iv clindamycin   I will give treatment with cefepime and flagyl ,pt has multiple abx allergies ,she tolerated cephalosporins in recent ICU admission to [address] [place] [address] in may 2020 for septic shock   pt is high risk due to multiple comorbidities and needs close monitoring and avoid fluid overload wi theth severe AS  ,plan was d/w sister ,pt is full code status   Education and Discussions with Family / Patient: with sister ,     Time Spent for Care: 1 hour  More than 50% of total time spent on counseling and coordination of care as described above  Current Patient Status: Inpatient   Anticipated Length of Stay:  Patient will be admitted on an Inpatient basis with an anticipated length of stay of   2 midnights  Justification for Hospital Stay:     [place] / Care Everywhere Records Reviewed Independently: Yes     For detailed history, assessment, and plan of care, please review the statements below by dr Nkechi Castañeda MD

## 2020-07-27 NOTE — PLAN OF CARE
Problem: Potential for Falls  Goal: Patient will remain free of falls  Description  INTERVENTIONS:  - Assess patient frequently for physical needs  -  Identify cognitive and physical deficits and behaviors that affect risk of falls  -  [place] fall precautions as indicated by assessment   - Educate patient/family on patient safety including physical limitations  - Instruct patient to call for assistance with activity based on assessment  - Modify environment to reduce risk of injury  - Consider OT/PT consult to assist with strengthening/mobility  7/26/2020 2327 by Ash Hinojosa RN  Outcome: Progressing  7/26/2020 2109 by Ash Hinojosa RN  Outcome: Progressing     Problem: Prexisting or High Potential for Compromised Skin Integrity  Goal: Skin integrity is maintained or improved  Description  INTERVENTIONS:  - Identify patients at risk for skin breakdown  - Assess and monitor skin integrity  - Assess and monitor nutrition and hydration status  - Monitor labs   - Assess for incontinence   - Turn and reposition patient  - Assist with mobility/ambulation  - Relieve pressure over bony prominences  - Avoid friction and shearing  - Provide appropriate hygiene as needed including keeping skin clean and dry  - Evaluate need for skin moisturizer/barrier cream  - Collaborate with interdisciplinary team   - Patient/family teaching  - Consider wound care consult   7/26/2020 2327 by Ash Hinojosa RN  Outcome: Not Progressing  7/26/2020 2109 by Ash Hinojosa RN  Outcome: Not Progressing     Problem: Nutrition/Hydration-ADULT  Goal: Nutrient/Hydration intake appropriate for improving, restoring or maintaining nutritional needs  Description  Monitor and assess patient's nutrition/hydration status for malnutrition  Collaborate with interdisciplinary team and initiate plan and interventions as ordered  Monitor patient's weight and dietary intake as ordered or per policy   Utilize nutrition screening tool and intervene as necessary  Determine patient's food preferences and provide high-protein, high-caloric foods as appropriate       INTERVENTIONS:  - Monitor oral intake, urinary output, labs, and treatment plans  - Assess nutrition and hydration status and recommend course of action  - Evaluate amount of meals eaten  - Assist patient with eating if necessary   - Allow adequate time for meals  - Recommend/ encourage appropriate diets, oral nutritional supplements, and vitamin/mineral supplements  - Order, calculate, and assess calorie counts as needed  - Recommend, monitor, and adjust tube feedings and TPN/PPN based on assessed needs  - Assess need for intravenous fluids  - Provide specific nutrition/hydration education as appropriate  - Include patient/family/caregiver in decisions related to nutrition  7/26/2020 2327 by Amy Thomas RN  Outcome: Progressing  7/26/2020 2109 by Amy Thomas RN  Outcome: Progressing     Problem: INFECTION - ADULT  Goal: Absence or prevention of progression during hospitalization  Description  INTERVENTIONS:  - Assess and monitor for signs and symptoms of infection  - Monitor lab/diagnostic results  - Monitor all insertion sites, i e  indwelling lines, tubes, and drains  - Monitor endotracheal if appropriate and nasal secretions for changes in amount and color  - [place] appropriate cooling/warming therapies per order  - Administer medications as ordered  - Instruct and encourage patient and family to use good hand hygiene technique  - Identify and instruct in appropriate isolation precautions for identified infection/condition  7/26/2020 2327 by Amy Thomas RN  Outcome: Not Progressing  7/26/2020 2109 by Amy Thomas RN  Outcome: Progressing  Goal: Absence of fever/infection during neutropenic period  Description  INTERVENTIONS:  - Monitor WBC    7/26/2020 2327 by Amy Thomas RN  Outcome: Progressing  7/26/2020 2109 by Amy Thomas RN  Outcome: Not Progressing

## 2020-07-27 NOTE — ASSESSMENT & PLAN NOTE
Chronic lymphedema bilateral lower extremities  Currently Lasix on hold due to hypotension    Will resume when able

## 2020-07-27 NOTE — PROGRESS NOTES
Pt refuses to change her b/l  LE dressings,swabbed her leg wound for wound culture since it was ordered  MANNY Bonilla  Is aware of it

## 2020-07-27 NOTE — OCCUPATIONAL THERAPY NOTE
Occupational Therapy Evaluation       07/27/20 1415   Note Type   Note type Eval only   Restrictions/Precautions   Other Precautions Chair Alarm; Bed Alarm; Fall Risk;Pain   Pain Assessment   Pain Assessment Tool 0-10   Pain Score 8   Pain Location/Orientation Orientation: Right;Location: Knee   Home Living   Type of [facility] [place] One level;Ramped entrance; Able to live on main level with bedroom/bathroom   Bathroom Shower/Tub Walk-in shower   Bathroom Toilet Standard   Bathroom Equipment Grab bars in [facility] [address]; Wheelchair-manual   Additional Comments Per patient, she does not get into her shower due to difficulty lifting legs over shower ledge, sponge bathes  Patient also reports she does not transfer to toilet or commode due to urinary urgency and instead wears diapers   Prior Function   Level of [place] Needs assistance with ADLs and functional mobility   Lives With Alone   Receives Help From Family;Home health  (Sister, VNA)   ADL Assistance Needs assistance   IADLs Needs assistance   Comments Patient reports that her sister comes over daily to assist with ADLs, iADLs  Patient reports she was ambulating short household distances with RW PTA with help of home PT   ADL   Eating Assistance 5  Supervision/Setup   Grooming Assistance 5  Supervision/Setup   UB Bathing Assistance 2  Maximal Assistance   LB [address] 1  Total [address] 2  Maximal [address] 1  Total [address]  1  Total Assistance   Bed Mobility   Supine to Sit 2  Maximal assistance   Additional items Assist x 2; Increased time required;HOB elevated  (LImited by increased R knee pain)   Transfers   Sit to Stand 3  Moderate assistance   Additional items Assist x 2; Increased time required   Stand to Sit 3  Moderate assistance   Additional items Assist x 2; Increased time required   Stand pivot 3  Moderate assistance   Additional items Assist x 2  (With RW)   Functional Mobility   Functional Mobility 3  Moderate assistance   Additional Comments Patient ambulated few steps from bed to chair with RW and mod assist x 2, increased time and assist required due to R knee pain  Patient with increased anxiety and fear of falls, required increased verbal cueing and encouragement to complete transfer safely   Balance   Static Sitting Fair   Dynamic Sitting Poor +   Static Standing Fair   Dynamic Standing Poor +   Activity Tolerance   Activity Tolerance Patient limited by pain   RUE Assessment   RUE Assessment WFL  (MMT 4-/5 throughout)   LUE Assessment   LUE Assessment WFL  (MMT 4-/5 throughout)   Cognition   Overall Cognitive Status WFL   Arousal/Participation Alert; Cooperative  (Anxious)   Attention Attends with cues to redirect   Orientation Level Oriented to person;Oriented to place;Oriented to situation   Memory Within functional limits   Following Commands Follows multistep commands with increased time or repetition   Comments Patient with increased anxiety due to increased pain in R knee  Assessment   Limitation Decreased ADL status; Decreased UE strength;Decreased Safe judgement during ADL;Decreased endurance;Decreased self-care trans;Decreased high-level ADLs   Prognosis Good   Assessment Patient evaluated by Occupational Therapy  Patient admitted with Recurrent cellulitis of lower extremity  The patients occupational profile, medical and therapy history includes a extensive additional review of physical, cognitive, or psychosocial history related to current functional performance  Comorbidities affecting functional mobility and ADLS include: anemia, anxiety, hypotension  Prior to admission, patient was requiring assist for functional mobility with RW, requiring assist for ADLS and requiring assist for IADLS    The evaluation identifies the following performance deficits: weakness, impaired balance, decreased endurance, increased fall risk, new onset of impairment of functional mobility, decreased ADLS, decreased IADLS, pain, decreased activity tolerance, decreased safety awareness, impaired judgement and decreased strength, that result in activity limitations and/or participation restrictions  This evaluation requires clinical decision making of high complexity, because the patient presents with comorbidites that affect occupational performance and required significant modification of tasks or assistance with consideration of multiple treatment options  The Barthel Index was used as a functional outcome tool presenting with a score of 20, indicating marked limitations of functional mobility and ADLS  Patient will benefit from skilled Occupational Therapy services to address above deficits and facilitate a safe return to prior level of function  Goals   Patient Goals To get stronger, go home   STG Time Frame   (1-7 days)   LTG Time Frame   (8-14 days)   Functional Transfer Goals   Pt Will Perform All Functional Transfers   (STG mod assist x 1, LTG min assist)   ADL Goals   Pt Will Perform Eating   (LTG independent)   Pt Will Perform Grooming   (STG supervision, LTG independent)   Pt Will Perform Bathing   (STG max assist, LTG mod assist)   Pt Will Perform UE Dressing   (STG mod assist, LTG min assist)   Pt Will Perform LE Dressing   (STG max assist, LTG mod assist)   Pt Will Perform Toileting   (STG max assist, LTG mod assist)   Plan   Treatment Interventions ADL retraining;Functional transfer training;UE strengthening/ROM; Endurance training;Patient/family training;Equipment evaluation/education; Compensatory technique education;Continued evaluation; Energy conservation; Activityengagement   Goal Expiration Date 08/10/20   OT Frequency 1-2x/wk   Recommendation   OT Discharge Recommendation Post-Acute Rehabilitation Services   Barthel Index   Feeding 5   Bathing 0   Grooming Score 0   Dressing Score 5   Bladder Score 0   Bowels Score 5   Toilet Use Score 0 Transfers (Bed/Chair) Score 5   Mobility (Level Surface) Score 0   Stairs Score 0   Barthel Index Score 20       [employer], OTR/L

## 2020-07-27 NOTE — ASSESSMENT & PLAN NOTE
· Patient has history of chronic iron deficiency anemia on iron supplementation  · Hb on admission 9 0 which per chart review is around patient's baseline  No signs of active bleeding  · Continue iron supplementation and monitor Hb

## 2020-07-27 NOTE — ASSESSMENT & PLAN NOTE
· Presents with worsening of redness and swelling of chronic lower extremity edema with chronic induration and weeping wounds  · WBC count 12 8, no fever  · Working diagnosis is cellulitis  · Wound culture of May 2020 grew Pseudomonas aeruginosa, Klebsiella pneumo, strep agalactiae, Proteus mirabilis, and diphtheroids  · Based on prior cultures and sensitivities, will start patient empirically on cefepime and Flagyl  Will consult ID  BCx were send  · Patient's qSOFA Score is 1 (hypotension)  Clinically patient does not appear septic at this time  She has chronic hypotension and did not take her regular dose of midodrine today  We will defer initial fluid bolus and start patient on 125 ml/h LR  Patient has high risk of decompensation due to severe aortic stenosis (MELIA 0 7 cm) and we will watch her fluid status closely and adjust IV fluids as needed

## 2020-07-28 PROBLEM — I35.0 SEVERE AORTIC STENOSIS: Status: ACTIVE | Noted: 2020-07-28

## 2020-07-28 PROBLEM — N17.9 AKI (ACUTE KIDNEY INJURY) (HCC): Status: RESOLVED | Noted: 2020-07-26 | Resolved: 2020-07-28

## 2020-07-28 LAB
ANION GAP SERPL CALCULATED.3IONS-SCNC: 6 MMOL/L (ref 4–13)
BASOPHILS # BLD AUTO: 0.01 THOUSANDS/ΜL (ref 0–0.1)
BASOPHILS NFR BLD AUTO: 0 % (ref 0–1)
BUN SERPL-MCNC: 27 MG/DL (ref 6–20)
CALCIUM SERPL-MCNC: 8.5 MG/DL (ref 8.4–10.2)
CHLORIDE SERPL-SCNC: 110 MMOL/L (ref 96–108)
CO2 SERPL-SCNC: 24 MMOL/L (ref 22–33)
CREAT SERPL-MCNC: 0.97 MG/DL (ref 0.4–1.1)
EOSINOPHIL # BLD AUTO: 0.35 THOUSAND/ΜL (ref 0–0.61)
EOSINOPHIL NFR BLD AUTO: 4 % (ref 0–6)
ERYTHROCYTE [DISTWIDTH] IN BLOOD BY AUTOMATED COUNT: 14.8 % (ref 11.6–15.1)
GFR SERPL CREATININE-BSD FRML MDRD: 59 ML/MIN/1.73SQ M
GLUCOSE SERPL-MCNC: 78 MG/DL (ref 65–140)
HCT VFR BLD AUTO: 25.7 % (ref 34.8–46.1)
HGB BLD-MCNC: 7.9 G/DL (ref 11.5–15.4)
IMM GRANULOCYTES # BLD AUTO: 0.02 THOUSAND/UL (ref 0–0.2)
IMM GRANULOCYTES NFR BLD AUTO: 0 % (ref 0–2)
LYMPHOCYTES # BLD AUTO: 1.46 THOUSANDS/ΜL (ref 0.6–4.47)
LYMPHOCYTES NFR BLD AUTO: 18 % (ref 14–44)
MCH RBC QN AUTO: 27.9 PG (ref 26.8–34.3)
MCHC RBC AUTO-ENTMCNC: 30.7 G/DL (ref 31.4–37.4)
MCV RBC AUTO: 91 FL (ref 82–98)
MONOCYTES # BLD AUTO: 0.63 THOUSAND/ΜL (ref 0.17–1.22)
MONOCYTES NFR BLD AUTO: 8 % (ref 4–12)
NEUTROPHILS # BLD AUTO: 5.62 THOUSANDS/ΜL (ref 1.85–7.62)
NEUTS SEG NFR BLD AUTO: 70 % (ref 43–75)
PLATELET # BLD AUTO: 254 THOUSANDS/UL (ref 149–390)
PMV BLD AUTO: 10.7 FL (ref 8.9–12.7)
POTASSIUM SERPL-SCNC: 3.3 MMOL/L (ref 3.5–5)
RBC # BLD AUTO: 2.83 MILLION/UL (ref 3.81–5.12)
SODIUM SERPL-SCNC: 140 MMOL/L (ref 133–145)
WBC # BLD AUTO: 8.09 THOUSAND/UL (ref 4.31–10.16)

## 2020-07-28 PROCEDURE — 99223 1ST HOSP IP/OBS HIGH 75: CPT | Performed by: INTERNAL MEDICINE

## 2020-07-28 PROCEDURE — 80048 BASIC METABOLIC PNL TOTAL CA: CPT | Performed by: INTERNAL MEDICINE

## 2020-07-28 PROCEDURE — 99232 SBSQ HOSP IP/OBS MODERATE 35: CPT | Performed by: INTERNAL MEDICINE

## 2020-07-28 PROCEDURE — 85025 COMPLETE CBC W/AUTO DIFF WBC: CPT | Performed by: INTERNAL MEDICINE

## 2020-07-28 RX ORDER — LIDOCAINE 50 MG/G
1 PATCH TOPICAL DAILY
Status: DISCONTINUED | OUTPATIENT
Start: 2020-07-29 | End: 2020-07-30 | Stop reason: HOSPADM

## 2020-07-28 RX ORDER — POTASSIUM CHLORIDE 20MEQ/15ML
40 LIQUID (ML) ORAL ONCE
Status: COMPLETED | OUTPATIENT
Start: 2020-07-28 | End: 2020-07-28

## 2020-07-28 RX ADMIN — BUSPIRONE HYDROCHLORIDE 15 MG: 5 TABLET ORAL at 18:25

## 2020-07-28 RX ADMIN — Medication 1 TABLET: at 09:06

## 2020-07-28 RX ADMIN — MIDODRINE HYDROCHLORIDE 10 MG: 5 TABLET ORAL at 16:34

## 2020-07-28 RX ADMIN — PRAVASTATIN SODIUM 20 MG: 20 TABLET ORAL at 16:34

## 2020-07-28 RX ADMIN — FERROUS SULFATE TAB 325 MG (65 MG ELEMENTAL FE) 325 MG: 325 (65 FE) TAB at 09:05

## 2020-07-28 RX ADMIN — MELATONIN 2000 UNITS: at 09:05

## 2020-07-28 RX ADMIN — ACETAMINOPHEN 650 MG: 325 TABLET, FILM COATED ORAL at 06:25

## 2020-07-28 RX ADMIN — METRONIDAZOLE 500 MG: 500 TABLET, FILM COATED ORAL at 13:48

## 2020-07-28 RX ADMIN — MIDODRINE HYDROCHLORIDE 10 MG: 5 TABLET ORAL at 06:15

## 2020-07-28 RX ADMIN — HEPARIN SODIUM 5000 UNITS: 5000 INJECTION INTRAVENOUS; SUBCUTANEOUS at 13:48

## 2020-07-28 RX ADMIN — POTASSIUM CHLORIDE 40 MEQ: 20 SOLUTION ORAL at 09:05

## 2020-07-28 RX ADMIN — BUSPIRONE HYDROCHLORIDE 15 MG: 5 TABLET ORAL at 09:05

## 2020-07-28 RX ADMIN — MIDODRINE HYDROCHLORIDE 10 MG: 5 TABLET ORAL at 12:06

## 2020-07-28 RX ADMIN — SODIUM CHLORIDE, SODIUM LACTATE, POTASSIUM CHLORIDE, AND CALCIUM CHLORIDE 75 ML/HR: .6; .31; .03; .02 INJECTION, SOLUTION INTRAVENOUS at 23:07

## 2020-07-28 RX ADMIN — HEPARIN SODIUM 5000 UNITS: 5000 INJECTION INTRAVENOUS; SUBCUTANEOUS at 06:15

## 2020-07-28 RX ADMIN — ESCITALOPRAM OXALATE 10 MG: 10 TABLET ORAL at 09:05

## 2020-07-28 RX ADMIN — HEPARIN SODIUM 5000 UNITS: 5000 INJECTION INTRAVENOUS; SUBCUTANEOUS at 21:11

## 2020-07-28 RX ADMIN — CEFEPIME HYDROCHLORIDE 1000 MG: 1 INJECTION, SOLUTION INTRAVENOUS at 09:06

## 2020-07-28 RX ADMIN — OXYCODONE HYDROCHLORIDE 5 MG: 5 TABLET ORAL at 09:04

## 2020-07-28 RX ADMIN — DIPHENHYDRAMINE HYDROCHLORIDE 25 MG: 50 INJECTION, SOLUTION INTRAMUSCULAR; INTRAVENOUS at 23:07

## 2020-07-28 RX ADMIN — BUPROPION HYDROCHLORIDE 300 MG: 150 TABLET, EXTENDED RELEASE ORAL at 18:25

## 2020-07-28 RX ADMIN — CEFEPIME HYDROCHLORIDE 1000 MG: 1 INJECTION, SOLUTION INTRAVENOUS at 21:11

## 2020-07-28 RX ADMIN — METRONIDAZOLE 500 MG: 500 TABLET, FILM COATED ORAL at 06:15

## 2020-07-28 NOTE — SOCIAL WORK
LOS: 2 GMLOS 3 1    SW was able to meet with pt this morning for assessment  Pt identifies her sister Bambi Bedoya ([phone]) as emergency contact  Pt also requests SW ensure her cell number ([phone]) is listed as primary contact number for her  The home number is not currently working  Pt advises that her cell phone does not yet have a voicemail either  Pt is windowed  Relies on social security and an inheritance (put away in stocks) for financial support  Pt lives alone in a [facility] with ramp entry  Bathroom has walk-in shower; however, pt primarily sponge bathes  Sister Elda visits daily to assist with ADLs  Pt ambulates via RW  Also owns adina w/c  Pt mostly in wheelchair or sitting in chair most of the day  Does not seem to a ambulate much  Pt notes that she purchased a power lift recliner through [facility] [employer] but has been getting the run around with delivery  Pt states that this is the reason why she is back in the hospital (because she couldn't elevate her legs w/o the recliner)  Pt uses Monique Perez for transportation needs  Pt notes she has been in the hospital 5 times since November due to her legs  She notes that she has had mult  SNF stays (most recent at [facility]) and would like to return to [facility] but fears she is out of SNF days  Apparently, [facility] was able to get an extension through Medicare due to the [place] pandemic  PCP is Dr Melissa Schafer  Preferred pharmacy is [facility] [address] [id number] [address] Ross advised pt of concern noted by [facility]  Pt became very upset  She states that it is a "lie" that she will not allow the nurses to complete dressing changes and that her sister has been helping her for years and would be more than willing to assist with wound care  At this time, pt requesting SW send SNF referral to [facility] for possible STR stay  SW spoke with [facility]  Amelia Stout confirmed it is documented mult   Times in her chart that pt has been refusing to have dressings on  Her legs have a copious amount of drainage that she lets leak onto the floor  Nursing as tried to educate sister Elda; however, it is documented that pt refuses to let Elda do the wound care either  Kimberly Mcgill confirms that if pt is agreeable to allow Elda to do the wound care they will accept back  KEYSHAWN called Elda to discuss  Elda confirmed this week she was to start doing the dressings 3x week independently  She is more than willing to assist  SW inquired if pt ever refused to allow Elda to help  Elda denied this  Elda expressed frustration with [facility] and the delivery of the power wheelchair  KEYSHAWN offered to reach out to [facility] to investigate  Elda made aware of recommendation for STR and pt's preference for [facility]  No other questions from Elda at this time  [facility] referral sent to [facility]  Per admissions, no bed available in the immediate future and no discharges planned for this week  SW to f/u with pt re additional preferences

## 2020-07-28 NOTE — PROGRESS NOTES
Patient states 8/10 pain in right knee  Wants to take oxy, her BP 92/41  Per resident ok to give pain medication with the BP 92/41

## 2020-07-28 NOTE — CONSULTS
Consultation - Infectious Disease   Harrison Buitrago 68 y o  female MRN: [id number]  Unit/Bed#: -01 Encounter: [id number]    Extensive review of the medical records in epic including review of the notes, radiographs, and laboratory results  IMPRESSION:    Recurrent cellulitis of lower extremities:  Wound cultures with growth of Proteus species and oxidase positive gram-negative evans  Blood cultures:  No growth to date   Leukocytosis:  Likely due to cellulitis  WBC has normalized   Lymphedema: chronic   Renal insufficiency:  Creatinine is improving   Hypotension:  BP is currently stable  Pt is on midodrine   History of multiple antibiotic allergies including vancomycin, ciprofloxacin, cephalosporin, sulfa: patient reports that her reaction to most of these medications is rash/itching  She has tolerated cefepime   History of depression anxiety: On bupropion, buspirone, escitalopram     RECOMMENDATIONS:    Follow leg wound cx results   MRSA nares screen   Continue cefepime 1g IV q12 hours  With continued improvement, transition to oral antibiotics (if possible based on susceptibilities) to complete a 10 day treatment course total    Discontinue metrondiazole po   Continue local wound care of legs with Xeroform, Kerlix, Safe-Clens and daily dressing changes   B/L lower extremity elevation advised   Monitor clinical response, temperature curve, WBC count  My recommendations were discussed with the patient in detail who verbalized understanding  My recommendations were discussed with medical resident, Dr Karlee Ballesteros, from the primary service  Thank you for allowing me to participate in the care of this patient  The ID service will follow      HISTORY OF PRESENT ILLNESS:  Reason for Consult: Cellulitis lower extremity  HPI: Harrison Buitrago is a 68y o  year old woman with PMH of recurrent cellulitis lower extremities, lymphedema, venous stasis ulcers who was admitted on July 26th with increased redness and swelling of both her legs  Her visiting nurse thought that her legs looked worse as he were more red and swollen  She advised emergency room evaluation  Patient denies fever, chills or increased leg pain  Of note she was admitted to [facility] in May 2020 for septic shock due to cellulitis  Wound cultures at that time grew Pseudomonas, Klebsiella pneumoniae, group B strep, Proteus and diphtheroids  She completed treatment course of cefepime and metronidazole and was transitioned to [facility] for rehab/skilled nursing  Pt says she feels better  She denies leg pain  She reports chronic arthritis of her hips and knees  She reports ongoing drainage from the back of both her legs  She is tolerating current antibiotics  REVIEW OF SYSTEMS:  Constitutional: Negative for chills, fever  HENT: Negative for congestion, runny nose, sore throat  Eyes: Negative for visual disturbance  Respiratory: Positive for shortness of breath (when she gets anxious)  Negative for cough, wheezing  Cardiovascular: Positive for leg swelling  Negative for chest pain, palpitations  Gastrointestinal: Positive for abdominal pain (before BM)  Negative for constipation, diarrhea, nausea and vomiting  Genitourinary: Negative for dysuria, hematuria  Musculoskeletal: + chronic hip and knee arthritis  Skin: Positive for wound per HPI  Itching on the top of her feet  Neurological: Negative for numbness/tingling   Psychiatric/Behavioral: She is anxious  PAST MEDICAL HISTORY:  Past Medical History:   Diagnosis Date    Anemia     Anxiety     Hypotension    chronic lymphedema with recurrent cellulitis, severe aortic stenosis (MELIA 0 7 cm on echo of 1/24/20), depression, anxiety      PSH: gastric bypass surgery    FAMILY HISTORY:  Negative for immunodeficiency or recurrent infection    SOCIAL HISTORY:  Social History   Social History     Substance and Sexual Activity   Alcohol Use Not on file Social History     Substance and Sexual Activity   Drug Use Not on file     Social History     Tobacco Use   Smoking Status Former Smoker   Smokeless Tobacco Never Used   Never smoked, denies alcohol use  Lives at home alone, but has a caregiver  Her sister also checks on her regularly  ALLERGIES:  Allergies   Allergen Reactions    Aspirin     Cephalosporins     Ciprofloxacin     Sulfa Antibiotics     Valium [Diazepam]     Vancomycin    Pt reports rash, itching as her reaction for most of the above medications  Valium caused hallucinations  MEDICATIONS:  All current active medications have been reviewed    Scheduled Meds:  Current Facility-Administered Medications:  acetaminophen 650 mg Oral Q6H PRN Tyler Carlson MD    buPROPion 300 mg Oral Daily Tyler Carlson MD    busPIRone 15 mg Oral BID Tyler Carlson MD    cefepime 1,000 mg Intravenous Q12H Tyler Carlson MD Last Rate: 1,000 mg (07/28/20 0906)   cholecalciferol 2,000 Units Oral Daily Tyler Carlson MD    cyclobenzaprine 5 mg Oral TID PRN Tyler Carlson MD    diphenhydrAMINE 25 mg Intravenous Q6H PRN Tyler Carlson MD    escitalopram 10 mg Oral Daily Tyler Carlson MD    ferrous sulfate 325 mg Oral Daily With Breakfast Tyler Carlson MD    heparin (porcine) 5,000 Units Subcutaneous [facility] Tyler Carlson MD    lactated ringers 75 mL/hr Intravenous Continuous Jimenez Pelayo MD Last Rate: 75 mL/hr (07/27/20 6433)   metroNIDAZOLE 500 mg Oral Q8H Nina Wood MD    midodrine 10 mg Oral TID AC Tyler Carlson MD    multivitamin-minerals 1 tablet Oral Daily Tyler Carlson MD    oxyCODONE 5 mg Oral Q6H PRN Jimenez Pelayo MD    pravastatin 20 mg Oral Daily With Jaylon Ariza MD      Continuous Infusions:  lactated ringers 75 mL/hr Last Rate: 75 mL/hr (07/27/20 0208)     PRN Meds:   acetaminophen    cyclobenzaprine    diphenhydrAMINE    oxyCODONE     PHYSICAL EXAM:  Temp:  [98 1 °F (36 7 °C)-99 4 °F (37 4 °C)] 98 4 °F (36 9 °C)  HR:  [70-88] 70  Resp:  [18-20] 18  BP: (81-98)/(41-47) 92/41  SpO2:  [95 %-97 %] 95 %  Temp (24hrs), Av 7 °F (37 1 °C), Min:98 1 °F (36 7 °C), Max:99 4 °F (37 4 °C)  Current: Temperature: 98 4 °F (36 9 °C)    Intake/Output Summary (Last 24 hours) at 2020 1621  Last data filed at 2020 0701  Gross per 24 hour   Intake 400 ml   Output 800 ml   Net -400 ml     General Appearance:  Appearing stated age, nontoxic, no acute distress   Head:  Normocephalic, without obvious abnormality, atraumatic   Eyes:  EOMI, Conjunctiva pink and sclera anicteric, both eyes   Nose: Nares normal, mucosa normal   Throat: Oropharynx moist    Lungs:  Clear to auscultation bilaterally, respirations unlabored   Heart:   S1, S2, RRR; 3/6 systolic murmur RUSB   Abdomen: Soft, non-tender, non-distended   :  No Bethea catheter present   Extremities:   B/l lower extremity edema   Skin: Erythema, edema of b/l distal lower extremities with minimal drainage and some skin scaling  No purulent drainage or foul odor is noted currently  Neurologic: Alert and oriented x 3, conversant, fluent speech  Psychiatric: Pt is anxious and tearful  She is frustrated that she has had multiple hospitalizations since 2019  LABS, IMAGING, & OTHER STUDIES:  Lab Results:  I have personally reviewed pertinent labs    Results from last 7 days   Lab Units 20  0616 20  0505 20  1640   WBC Thousand/uL 8 09 11 96* 12 80*   HEMOGLOBIN g/dL 7 9* 8 3* 9 0*   PLATELETS Thousands/uL 254 202 346     Results from last 7 days   Lab Units 20  0616 20  1030 20  1640   SODIUM mmol/L 140 139 136   POTASSIUM mmol/L 3 3* 3 7 4 0   CHLORIDE mmol/L 110* 109* 106   CO2 mmol/L 24 23 22   BUN mg/dL 27* 33* 41*   CREATININE mg/dL 0 97 1 13* 1 39*   EGFR ml/min/1 73sq m 59 49 38   CALCIUM mg/dL 8 5 8 6 9 3     Results from last 7 days   Lab Units 20  0240 07/26/20  1700   BLOOD CULTURE   --  No Growth at 24 hrs  No Growth at 24 hrs  GRAM STAIN RESULT  No polys seen*  4+ Gram negative rods*  --    WOUND CULTURE  3+ Growth of Oxidase Positive gram negative evans*  3+ Growth of Proteus species*  --      Imaging Studies:   7/27 RT knee XR: There is a small joint effusion  I have personally reviewed pertinent imaging study reports

## 2020-07-28 NOTE — ASSESSMENT & PLAN NOTE
On presentation creatinine-1 39>> 0 97  Creatinine trend seems to be improving    Continue IV fluids with LR at 75 cc/hour

## 2020-07-28 NOTE — PROGRESS NOTES
Progress Note - Donald Gooden 1950, 68 y o  female MRN: [id number]    Unit/Bed#: -01 Encounter: [id number]    Primary Care Provider: Jelani Lang MD   Date and time admitted to hospital: 7/26/2020  3:03 PM        * Recurrent cellulitis of lower extremity  Assessment & Plan  Patient presents with worsening swelling and erythema of bilateral lower extremities  Serous discharge from bilateral lower extremities>> discharge seems to be improving  Leukocytosis-12 80>>8 09  Blood cultures> no growth for 24 hours  MRSA screen ordered  Wound cultures> 4 positive gram-negative rods  Patient is afebrile since admission, although patient was hypotensive, responded to fluids  Continue midodrine  Previous wound cultures positive for Pseudomonas  Continue cefepime and Flagyl-day 3  Infectious disease has been consulted  Severe aortic stenosis  Assessment & Plan  Patient has severe aortic stenosis with valve area of 0 7  Close monitoring of volume status    Lymphedema  Assessment & Plan  Chronic lymphedema bilateral lower extremities  Currently Lasix on hold due to hypotension  Will resume when able    Vitamin D deficiency  Assessment & Plan  Continue vitamin-D supplementation    Edema  Assessment & Plan  Daily dose of Lasix on hold due to hypotension  Will resume when able    Depression  Assessment & Plan  Continue escitalopram and bupropion    Idiopathic hypotension  Assessment & Plan  Continue midodrine 10 mg t i d  Anxiety  Assessment & Plan  Continue buspirone and escitalopram    AMADOU (acute kidney injury) (HCC)-resolved as of 7/28/2020  Assessment & Plan  On presentation creatinine-1 39>> 0 97  Creatinine trend seems to be improving    Continue IV fluids with LR at 75 cc/hour    ----------------------------------------------------------------------------------------  HPI/24hr events:  Patient hypotensive overnight received 250 mL of fluid bolus    Disposition:  Continue inpatient care  Code Status: Level 1 - Full Code  ---------------------------------------------------------------------------------------  SUBJECTIVE  Patient seen at bedside today morning  Appears in no acute apparent distress  Patient is not oriented to time place and person  Patient reports she feels overall well and reports swelling of the lower extremities is improving  Review of Systems   Constitutional: Negative for activity change  HENT: Negative for congestion and ear pain  Eyes: Negative for discharge  Respiratory: Negative for apnea, cough, chest tightness and shortness of breath  Cardiovascular: Negative for chest pain  Gastrointestinal: Negative for abdominal distention and abdominal pain  Genitourinary: Negative for dyspareunia and dysuria  Musculoskeletal: Negative for arthralgias  Neurological: Negative for dizziness and facial asymmetry        ---------------------------------------------------------------------------------------  OBJECTIVE    Vitals   Vitals:    20 2300 20 0500 20 0807   BP: (!) 81/45 (!) 98/43 (!) 89/47 (!) 92/41   BP Location: Right arm Left arm Right arm Left arm   Pulse: 88 75 86 70   Resp: 20 18 18 18   Temp: 99 4 °F (37 4 °C) 98 1 °F (36 7 °C) 99 °F (37 2 °C) 98 4 °F (36 9 °C)   TempSrc: Tympanic Tympanic Tympanic Tympanic   SpO2: 96% 97% 95% 95%   Weight:       Height:         Temp (24hrs), Av 4 °F (36 9 °C), Min:97 1 °F (36 2 °C), Max:99 4 °F (37 4 °C)  Current: Temperature: 98 4 °F (36 9 °C)          Respiratory:  SpO2: SpO2: 95 %       Invasive/non-invasive ventilation settings   Respiratory    Lab Data (Last 4 hours)    None         O2/Vent Data (Last 4 hours)    None                Physical Exam   Constitutional: She is oriented to person, place, and time  She appears well-developed and well-nourished  Eyes: Pupils are equal, round, and reactive to light  EOM are normal    Neck: Normal range of motion  Neck supple  Cardiovascular: Normal rate and regular rhythm  Pulmonary/Chest: Effort normal  No stridor  No respiratory distress  She has no wheezes  Abdominal: Soft  Bowel sounds are normal    Musculoskeletal:   Bilateral lower extremities, edematous  Erythema seems to be improving  Very minimal discharged  Neurological: She is alert and oriented to person, place, and time  No cranial nerve deficit or sensory deficit  She exhibits normal muscle tone  Skin: Skin is warm  Laboratory and Diagnostics:  Results from last 7 days   Lab Units 07/28/20  0616 07/27/20  0505 07/26/20  1640   WBC Thousand/uL 8 09 11 96* 12 80*   HEMOGLOBIN g/dL 7 9* 8 3* 9 0*   HEMATOCRIT % 25 7* 26 3* 28 6*   PLATELETS Thousands/uL 254 202 346   NEUTROS PCT % 70 79* 74   MONOS PCT % 8 7 6     Results from last 7 days   Lab Units 07/28/20  0616 07/27/20  1030 07/26/20  1640   SODIUM mmol/L 140 139 136   POTASSIUM mmol/L 3 3* 3 7 4 0   CHLORIDE mmol/L 110* 109* 106   CO2 mmol/L 24 23 22   ANION GAP mmol/L 6 7 8   BUN mg/dL 27* 33* 41*   CREATININE mg/dL 0 97 1 13* 1 39*   CALCIUM mg/dL 8 5 8 6 9 3   GLUCOSE RANDOM mg/dL 78 125 79          Results from last 7 days   Lab Units 07/26/20  1640   INR  0 97   PTT seconds 31      Results from last 7 days   Lab Units 07/26/20  1640   TROPONIN I ng/mL <0 03         ABG:    VBG:          Micro  Results from last 7 days   Lab Units 07/27/20  0245 07/26/20  1700   BLOOD CULTURE   --  No Growth at 24 hrs  No Growth at 24 hrs  GRAM STAIN RESULT  No polys seen*  4+ Gram negative rods*  --        Imaging: I have personally reviewed pertinent reports  Intake and Output  I/O       07/26 0701 - 07/27 0700 07/27 0701 - 07/28 0700 07/28 0701 - 07/29 0700    P  O  350  400    Total Intake(mL/kg) 350 (5 2)  400 (6)    Urine (mL/kg/hr)   800 (8 7)    Total Output   800    Net +350  -400           Unmeasured Urine Occurrence 1 x      Unmeasured Stool Occurrence 0 x            Height and Weights   Height: 4' 11" (149 9 cm)  IBW: 43 2 kg  Body mass index is 29 89 kg/m²  Weight (last 2 days)     Date/Time   Weight    07/26/20 2103   67 1 (148)                Nutrition       Diet Orders   (From admission, onward)             Start     Ordered    07/26/20 2003  Diet Cardiovascular; Sodium 2 GM  Diet effective now     Question Answer Comment   Diet Type Cardiovascular    Cardiac Sodium 2 GM    RD to adjust diet per protocol?  Yes        07/26/20 2002                  Active Medications  Scheduled Meds:    Current Facility-Administered Medications:  acetaminophen 650 mg Oral Q6H PRN Lauren Bonilla MD    buPROPion 300 mg Oral Daily Lauren Bonilla MD    busPIRone 15 mg Oral BID Lauren Bonilla MD    cefepime 1,000 mg Intravenous Q12H Lauren Bonilla MD Last Rate: 1,000 mg (07/27/20 2123)   cholecalciferol 2,000 Units Oral Daily Lauren Bonilla MD    cyclobenzaprine 5 mg Oral TID PRN Lauren Bonilla MD    diphenhydrAMINE 25 mg Intravenous Q6H PRN Lauren Bonilla MD    escitalopram 10 mg Oral Daily Lauren Bonilla MD    ferrous sulfate 325 mg Oral Daily With Breakfast Lauren Bonilla MD    heparin (porcine) 5,000 Units Subcutaneous [facility] Lauren Bonilla MD    lactated ringers 75 mL/hr Intravenous Continuous Bird Alex MD Last Rate: 75 mL/hr (07/27/20 5372)   metroNIDAZOLE 500 mg Oral Q8H Theda Habermann, MD    midodrine 10 mg Oral TID AC Lauren Bonilla MD    multivitamin-minerals 1 tablet Oral Daily Lauren Bonilla MD    oxyCODONE 5 mg Oral Q6H PRN Bird Alex MD    potassium chloride 40 mEq Oral Once Milton Sierra MD    pravastatin 20 mg Oral Daily With Saritha Resendez MD      Continuous Infusions:    lactated ringers 75 mL/hr Last Rate: 75 mL/hr (07/27/20 0208)     PRN Meds:     acetaminophen 650 mg Q6H PRN   cyclobenzaprine 5 mg TID PRN   diphenhydrAMINE 25 mg Q6H PRN   oxyCODONE 5 mg Q6H PRN       Invasive Devices Review  Invasive Devices Peripheral Intravenous Line            Peripheral IV Dorsal (posterior); Left Hand -- days    Peripheral IV 07/26/20 Left Antecubital 2 days                Rationale for remaining devices:  Intravenous antibiotics  ----------------------------------------------------------------------------------------------------------------------------      Alberto Mena MD      Portions of the record may have been created with voice recognition software  Occasional wrong word or "sound a like" substitutions may have occurred due to the inherent limitations of voice recognition software    Read the chart carefully and recognize, using context, where substitutions have occurred

## 2020-07-28 NOTE — PLAN OF CARE
Problem: Potential for Falls  Goal: Patient will remain free of falls  Description  INTERVENTIONS:  - Assess patient frequently for physical needs  -  Identify cognitive and physical deficits and behaviors that affect risk of falls  -  [place] fall precautions as indicated by assessment   - Educate patient/family on patient safety including physical limitations  - Instruct patient to call for assistance with activity based on assessment  - Modify environment to reduce risk of injury  - Consider OT/PT consult to assist with strengthening/mobility  Outcome: Progressing     Problem: Prexisting or High Potential for Compromised Skin Integrity  Goal: Skin integrity is maintained or improved  Description  INTERVENTIONS:  - Identify patients at risk for skin breakdown  - Assess and monitor skin integrity  - Assess and monitor nutrition and hydration status  - Monitor labs   - Assess for incontinence   - Turn and reposition patient  - Assist with mobility/ambulation  - Relieve pressure over bony prominences  - Avoid friction and shearing  - Provide appropriate hygiene as needed including keeping skin clean and dry  - Evaluate need for skin moisturizer/barrier cream  - Collaborate with interdisciplinary team   - Patient/family teaching  - Consider wound care consult   Outcome: Progressing     Problem: Nutrition/Hydration-ADULT  Goal: Nutrient/Hydration intake appropriate for improving, restoring or maintaining nutritional needs  Description  Monitor and assess patient's nutrition/hydration status for malnutrition  Collaborate with interdisciplinary team and initiate plan and interventions as ordered  Monitor patient's weight and dietary intake as ordered or per policy  Utilize nutrition screening tool and intervene as necessary  Determine patient's food preferences and provide high-protein, high-caloric foods as appropriate       INTERVENTIONS:  - Monitor oral intake, urinary output, labs, and treatment plans  - Assess nutrition and hydration status and recommend course of action  - Evaluate amount of meals eaten  - Assist patient with eating if necessary   - Allow adequate time for meals  - Recommend/ encourage appropriate diets, oral nutritional supplements, and vitamin/mineral supplements  - Order, calculate, and assess calorie counts as needed  - Recommend, monitor, and adjust tube feedings and TPN/PPN based on assessed needs  - Assess need for intravenous fluids  - Provide specific nutrition/hydration education as appropriate  - Include patient/family/caregiver in decisions related to nutrition  Outcome: Progressing     Problem: INFECTION - ADULT  Goal: Absence or prevention of progression during hospitalization  Description  INTERVENTIONS:  - Assess and monitor for signs and symptoms of infection  - Monitor lab/diagnostic results  - Monitor all insertion sites, i e  indwelling lines, tubes, and drains  - Monitor endotracheal if appropriate and nasal secretions for changes in amount and color  - [place] appropriate cooling/warming therapies per order  - Administer medications as ordered  - Instruct and encourage patient and family to use good hand hygiene technique  - Identify and instruct in appropriate isolation precautions for identified infection/condition  Outcome: Progressing  Goal: Absence of fever/infection during neutropenic period  Description  INTERVENTIONS:  - Monitor WBC    Outcome: Progressing

## 2020-07-28 NOTE — ASSESSMENT & PLAN NOTE
Patient presents with worsening swelling and erythema of bilateral lower extremities  Serous discharge from bilateral lower extremities>> discharge seems to be improving  Leukocytosis-12 80>>8 09  Blood cultures> no growth for 24 hours  MRSA screen ordered  Wound cultures> 4 positive gram-negative rods  Patient is afebrile since admission, although patient was hypotensive, responded to fluids  Continue midodrine  Previous wound cultures positive for Pseudomonas  Continue cefepime and Flagyl-day 3  Infectious disease has been consulted

## 2020-07-28 NOTE — NURSING NOTE
Patients vitals at 2000 were 81/45, not symptomatic  She does have a history of chronic hypotension  Medical resident Moris Khoury and Keith Mosley were made aware  Came and assessed the patient and ordered a 250ml bolous  Latest blood pressure is 98/43 with a MAP of 61  Will continue to monitor the patient

## 2020-07-29 LAB
ANION GAP SERPL CALCULATED.3IONS-SCNC: 6 MMOL/L (ref 4–13)
BASOPHILS # BLD AUTO: 0.01 THOUSANDS/ΜL (ref 0–0.1)
BASOPHILS NFR BLD AUTO: 0 % (ref 0–1)
BUN SERPL-MCNC: 22 MG/DL (ref 6–20)
CALCIUM SERPL-MCNC: 8.5 MG/DL (ref 8.4–10.2)
CHLORIDE SERPL-SCNC: 110 MMOL/L (ref 96–108)
CO2 SERPL-SCNC: 25 MMOL/L (ref 22–33)
CREAT SERPL-MCNC: 0.94 MG/DL (ref 0.4–1.1)
EOSINOPHIL # BLD AUTO: 0.41 THOUSAND/ΜL (ref 0–0.61)
EOSINOPHIL NFR BLD AUTO: 5 % (ref 0–6)
ERYTHROCYTE [DISTWIDTH] IN BLOOD BY AUTOMATED COUNT: 15 % (ref 11.6–15.1)
GFR SERPL CREATININE-BSD FRML MDRD: 61 ML/MIN/1.73SQ M
GLUCOSE SERPL-MCNC: 72 MG/DL (ref 65–140)
HCT VFR BLD AUTO: 26.6 % (ref 34.8–46.1)
HGB BLD-MCNC: 8 G/DL (ref 11.5–15.4)
IMM GRANULOCYTES # BLD AUTO: 0.02 THOUSAND/UL (ref 0–0.2)
IMM GRANULOCYTES NFR BLD AUTO: 0 % (ref 0–2)
LYMPHOCYTES # BLD AUTO: 1.43 THOUSANDS/ΜL (ref 0.6–4.47)
LYMPHOCYTES NFR BLD AUTO: 18 % (ref 14–44)
MCH RBC QN AUTO: 27.5 PG (ref 26.8–34.3)
MCHC RBC AUTO-ENTMCNC: 30.1 G/DL (ref 31.4–37.4)
MCV RBC AUTO: 91 FL (ref 82–98)
MONOCYTES # BLD AUTO: 0.65 THOUSAND/ΜL (ref 0.17–1.22)
MONOCYTES NFR BLD AUTO: 8 % (ref 4–12)
MRSA NOSE QL CULT: NORMAL
NEUTROPHILS # BLD AUTO: 5.38 THOUSANDS/ΜL (ref 1.85–7.62)
NEUTS SEG NFR BLD AUTO: 69 % (ref 43–75)
PLATELET # BLD AUTO: 260 THOUSANDS/UL (ref 149–390)
PMV BLD AUTO: 11 FL (ref 8.9–12.7)
POTASSIUM SERPL-SCNC: 3.7 MMOL/L (ref 3.5–5)
RBC # BLD AUTO: 2.91 MILLION/UL (ref 3.81–5.12)
SODIUM SERPL-SCNC: 141 MMOL/L (ref 133–145)
WBC # BLD AUTO: 7.9 THOUSAND/UL (ref 4.31–10.16)

## 2020-07-29 PROCEDURE — 85025 COMPLETE CBC W/AUTO DIFF WBC: CPT | Performed by: INTERNAL MEDICINE

## 2020-07-29 PROCEDURE — U0003 INFECTIOUS AGENT DETECTION BY NUCLEIC ACID (DNA OR RNA); SEVERE ACUTE RESPIRATORY SYNDROME CORONAVIRUS 2 (SARS-COV-2) (CORONAVIRUS DISEASE [COVID-19]), AMPLIFIED PROBE TECHNIQUE, MAKING USE OF HIGH THROUGHPUT TECHNOLOGIES AS DESCRIBED BY CMS-2020-01-R: HCPCS | Performed by: INTERNAL MEDICINE

## 2020-07-29 PROCEDURE — 97116 GAIT TRAINING THERAPY: CPT

## 2020-07-29 PROCEDURE — 99232 SBSQ HOSP IP/OBS MODERATE 35: CPT | Performed by: INTERNAL MEDICINE

## 2020-07-29 PROCEDURE — 80048 BASIC METABOLIC PNL TOTAL CA: CPT | Performed by: INTERNAL MEDICINE

## 2020-07-29 PROCEDURE — 97530 THERAPEUTIC ACTIVITIES: CPT

## 2020-07-29 RX ORDER — LORAZEPAM 0.5 MG/1
0.5 TABLET ORAL ONCE
Status: COMPLETED | OUTPATIENT
Start: 2020-07-29 | End: 2020-07-29

## 2020-07-29 RX ADMIN — BUSPIRONE HYDROCHLORIDE 15 MG: 5 TABLET ORAL at 18:10

## 2020-07-29 RX ADMIN — OXYCODONE HYDROCHLORIDE 5 MG: 5 TABLET ORAL at 15:49

## 2020-07-29 RX ADMIN — Medication 1 TABLET: at 08:24

## 2020-07-29 RX ADMIN — HEPARIN SODIUM 5000 UNITS: 5000 INJECTION INTRAVENOUS; SUBCUTANEOUS at 15:08

## 2020-07-29 RX ADMIN — SODIUM CHLORIDE, SODIUM LACTATE, POTASSIUM CHLORIDE, AND CALCIUM CHLORIDE 75 ML/HR: .6; .31; .03; .02 INJECTION, SOLUTION INTRAVENOUS at 12:23

## 2020-07-29 RX ADMIN — PRAVASTATIN SODIUM 20 MG: 20 TABLET ORAL at 15:49

## 2020-07-29 RX ADMIN — FERROUS SULFATE TAB 325 MG (65 MG ELEMENTAL FE) 325 MG: 325 (65 FE) TAB at 08:24

## 2020-07-29 RX ADMIN — BUSPIRONE HYDROCHLORIDE 15 MG: 5 TABLET ORAL at 08:24

## 2020-07-29 RX ADMIN — HEPARIN SODIUM 5000 UNITS: 5000 INJECTION INTRAVENOUS; SUBCUTANEOUS at 21:36

## 2020-07-29 RX ADMIN — MIDODRINE HYDROCHLORIDE 10 MG: 5 TABLET ORAL at 12:23

## 2020-07-29 RX ADMIN — LORAZEPAM 0.5 MG: 0.5 TABLET ORAL at 20:27

## 2020-07-29 RX ADMIN — BUPROPION HYDROCHLORIDE 300 MG: 150 TABLET, EXTENDED RELEASE ORAL at 18:10

## 2020-07-29 RX ADMIN — MELATONIN 2000 UNITS: at 08:24

## 2020-07-29 RX ADMIN — MIDODRINE HYDROCHLORIDE 10 MG: 5 TABLET ORAL at 15:49

## 2020-07-29 RX ADMIN — CEFEPIME HYDROCHLORIDE 1000 MG: 1 INJECTION, SOLUTION INTRAVENOUS at 08:24

## 2020-07-29 RX ADMIN — MIDODRINE HYDROCHLORIDE 10 MG: 5 TABLET ORAL at 08:24

## 2020-07-29 RX ADMIN — LIDOCAINE 1 PATCH: 50 PATCH CUTANEOUS at 08:24

## 2020-07-29 RX ADMIN — ESCITALOPRAM OXALATE 10 MG: 10 TABLET ORAL at 08:24

## 2020-07-29 RX ADMIN — CEFEPIME HYDROCHLORIDE 1000 MG: 1 INJECTION, SOLUTION INTRAVENOUS at 20:28

## 2020-07-29 RX ADMIN — HEPARIN SODIUM 5000 UNITS: 5000 INJECTION INTRAVENOUS; SUBCUTANEOUS at 06:26

## 2020-07-29 RX ADMIN — DIPHENHYDRAMINE HYDROCHLORIDE 25 MG: 50 INJECTION, SOLUTION INTRAMUSCULAR; INTRAVENOUS at 06:26

## 2020-07-29 RX ADMIN — ACETAMINOPHEN 650 MG: 325 TABLET, FILM COATED ORAL at 21:36

## 2020-07-29 NOTE — PROGRESS NOTES
Progress Note - Binta Painter 1950, 68 y o  female MRN: [id number]    Unit/Bed#: -[facility] Encounter: [id number]    Primary Care Provider: Ced Harrison MD   Date and time admitted to hospital: 7/26/2020  3:03 PM        * Recurrent cellulitis of lower extremity  Assessment & Plan  Patient presents with worsening swelling and erythema of bilateral lower extremities  Serous discharge from bilateral lower extremities>> discharge seems to be improving  Leukocytosis-12 80>>8 09>7 9  Blood cultures> no growth  MRSA screen ordered  Wound cultures> Proteus and oxidase positive Gram-negative evans  Patient is afebrile since admission, although patient was hypotensive, responded to fluids  Continue midodrine  Previous wound cultures positive for Pseudomonas  Continue cefepime> day 4  Flagyl has been discontinued  Infectious disease> recommends total 10 days course of antibiotics,Once sensitivities back      Severe aortic stenosis  Assessment & Plan  Patient has severe aortic stenosis with valve area of 0 7  Close monitoring of volume status    Lymphedema  Assessment & Plan  Chronic lymphedema bilateral lower extremities  Currently Lasix on hold due to hypotension  Will resume when able    Anemia  Assessment & Plan  Continue iron supplementation    Depression  Assessment & Plan  Continue escitalopram and bupropion    Idiopathic hypotension  Assessment & Plan  Continue midodrine 10 mg t i d  Anxiety  Assessment & Plan  Continue buspirone and escitalopram    ----------------------------------------------------------------------------------------  HPI/24hr events:  No overnight events reported      Code Status: Level 1 - Full Code  ---------------------------------------------------------------------------------------  SUBJECTIVE  Patient seen at bedside in the morning  Appears in no acute apparent distress  Patient is alert oriented time place and person  Patient reports minimal pain in her lower extremities  No fevers, no chills  Lower extremity swelling seems to be improving, very minimal discharge    Review of Systems  Review of systems was reviewed and negative unless stated above in HPI/24-hour events   ---------------------------------------------------------------------------------------  OBJECTIVE    Vitals   Vitals:    20 1637 20 0316 20 0813   BP: 96/51 106/50 (!) 95/49 (!) 100/49   BP Location: Right arm Right arm Right arm Right arm   Pulse:  70 74 73   Resp:  20 16 18   Temp:  99 5 °F (37 5 °C) 98 4 °F (36 9 °C) 97 9 °F (36 6 °C)   TempSrc:  Tympanic Tympanic Tympanic   SpO2:  97% 96% 97%   Weight:       Height:         Temp (24hrs), Av 6 °F (37 °C), Min:97 9 °F (36 6 °C), Max:99 5 °F (37 5 °C)  Current: Temperature: 97 9 °F (36 6 °C)          Respiratory:  SpO2: SpO2: 97 %       Invasive/non-invasive ventilation settings   Respiratory    Lab Data (Last 4 hours)    None         O2/Vent Data (Last 4 hours)    None                Physical Exam   Constitutional: She is oriented to person, place, and time  She appears well-developed and well-nourished  Eyes: Pupils are equal, round, and reactive to light  Neck: Normal range of motion  Neck supple  Cardiovascular: Normal rate and regular rhythm  Pulmonary/Chest: Effort normal and breath sounds normal    Abdominal: Soft  Bowel sounds are normal    Musculoskeletal: Normal range of motion  Bilateral lower extremities, with dressing  Swelling and erythema seems to be improving  Minimal discharge   Neurological: She is alert and oriented to person, place, and time  Skin: Skin is warm         Laboratory and Diagnostics:  Results from last 7 days   Lab Units 20  0524 20  0616 20  0505 20  1640   WBC Thousand/uL 7 90 8 09 11 96* 12 80*   HEMOGLOBIN g/dL 8 0* 7 9* 8 3* 9 0*   HEMATOCRIT % 26 6* 25 7* 26 3* 28 6*   PLATELETS Thousands/uL 260 254 202 346   NEUTROS PCT % 69 70 79* 74   MONOS PCT % 8 8 7 6     Results from last 7 days   Lab Units 07/29/20  0523 07/28/20  0616 07/27/20  1030 07/26/20  1640   SODIUM mmol/L 141 140 139 136   POTASSIUM mmol/L 3 7 3 3* 3 7 4 0   CHLORIDE mmol/L 110* 110* 109* 106   CO2 mmol/L 25 24 23 22   ANION GAP mmol/L 6 6 7 8   BUN mg/dL 22* 27* 33* 41*   CREATININE mg/dL 0 94 0 97 1 13* 1 39*   CALCIUM mg/dL 8 5 8 5 8 6 9 3   GLUCOSE RANDOM mg/dL 72 78 125 79          Results from last 7 days   Lab Units 07/26/20  1640   INR  0 97   PTT seconds 31      Results from last 7 days   Lab Units 07/26/20  1640   TROPONIN I ng/mL <0 03         ABG:    VBG:          Micro  Results from last 7 days   Lab Units 07/27/20  1731 07/27/20  0245 07/26/20  1700   BLOOD CULTURE   --   --  No Growth at 48 hrs  No Growth at 48 hrs  GRAM STAIN RESULT   --  No polys seen*  4+ Gram negative rods*  --    WOUND CULTURE   --  4+ Growth of Pseudomonas aeruginosa*  4+ Growth of Proteus mirabilis*  1+ Growth of Gram-negative evans- species*  4+ Growth of   --    MRSA CULTURE ONLY  No Methicillin Resistant Staphlyococcus aureus (MRSA) isolated  --   --        Imaging: I have personally reviewed pertinent reports  Intake and Output  I/O       07/27 0701 - 07/28 0700 07/28 0701 - 07/29 0700 07/29 0701 - 07/30 0700    P  O   400     Total Intake(mL/kg)  400 (6)     Urine (mL/kg/hr)  800 (0 5)     Total Output  800     Net  -400                  Height and Weights   Height: 4' 11" (149 9 cm)  IBW: 43 2 kg  Body mass index is 29 89 kg/m²  Weight (last 2 days)     None            Nutrition       Diet Orders   (From admission, onward)             Start     Ordered    07/26/20 2003  Diet Cardiovascular; Sodium 2 GM  Diet effective now     Question Answer Comment   Diet Type Cardiovascular    Cardiac Sodium 2 GM    RD to adjust diet per protocol?  Yes        07/26/20 2002                  Active Medications  Scheduled Meds:    Current Facility-Administered Medications:  acetaminophen 650 mg Oral Q6H PRN Tyler Carlson MD    buPROPion 300 mg Oral Daily Tyler Carlson MD    busPIRone 15 mg Oral BID Tyler Carlson MD    cefepime 1,000 mg Intravenous Q12H Tyler Carlson MD Last Rate: 1,000 mg (07/29/20 0824)   cholecalciferol 2,000 Units Oral Daily Tyler Carlson MD    cyclobenzaprine 5 mg Oral TID PRN Tyler Carlson MD    diphenhydrAMINE 25 mg Intravenous Q6H PRN Tyler Carlson MD    escitalopram 10 mg Oral Daily Tylre Carlson MD    ferrous sulfate 325 mg Oral Daily With Breakfast Tyler aCrlson MD    heparin (porcine) 5,000 Units Subcutaneous Q8H [facility] Tyler Carlson MD    lactated ringers 75 mL/hr Intravenous Continuous Jimenez Pelayo MD Last Rate: 75 mL/hr (07/29/20 1223)   lidocaine 1 patch Topical Daily Ron Leblanc MD    midodrine 10 mg Oral TID AC Tyler Carlson MD    multivitamin-minerals 1 tablet Oral Daily Tyler Carlson MD    oxyCODONE 5 mg Oral Q6H PRN Jimenez Pelayo MD    pravastatin 20 mg Oral Daily With Jaylon Ariza MD      Continuous Infusions:    lactated ringers 75 mL/hr Last Rate: 75 mL/hr (07/29/20 1223)     PRN Meds:     acetaminophen 650 mg Q6H PRN   cyclobenzaprine 5 mg TID PRN   diphenhydrAMINE 25 mg Q6H PRN   oxyCODONE 5 mg Q6H PRN       Invasive Devices Review  Invasive Devices     Peripheral Intravenous Line            Peripheral IV Dorsal (posterior); Left Hand -- days    Peripheral IV 07/26/20 Left Antecubital 3 days                      Ron Leblanc MD      Portions of the record may have been created with voice recognition software  Occasional wrong word or "sound a like" substitutions may have occurred due to the inherent limitations of voice recognition software    Read the chart carefully and recognize, using context, where substitutions have occurred

## 2020-07-29 NOTE — CONSULTS
Consultation - Lit Murillo 68 y o  female MRN: [id number]    Unit/Bed#: -01 Encounter: [id number]      Identifying Data:  68years old white female is admitted at [facility] on campus on July the 06/20/2020 with cellulitis of her both lower limbs  Psychiatric consultation is asked for the depression  Patient examined spoke with the nurse history physical medications labs reviewed and noted  This patient is known to me from seen previously at this hospital for the depression  Patient remember seeing me but he does not remember my name  Patient has been noncompliance with the recommended treatments  Patient was advised for psychiatric follow-up after the discharge but she did not go and currently she is still continue getting her psychotropic medications from her family physician  Patient is a poor historian but she is able to tell me basic information  Patient is not confused she is able to tell me her age date of birth current year current month name of the President and name of her family physician Dr Tia Campos   Currently patient is on Lexapro 10 mg daily BuSpar 15 mg p o  B i d  And Wellbutrin  mg daily  No side effects of medications reported or noted  Patient has no history of drug and alcohol abuse and drug and alcohol level was not ordered at the time of the admission  Patient feels stressed depressed sad she cries sometime her main concern is her health she is concerned about her health regarding ongoing chronic medical condition infection on her legs  She has no other stressors     Nurse reported no behavior problem  Patient takes her medications  Social history  patient is single she lives alone in her parent's home she has no children she has no boyfriend patient denies smoking denies abusing alcohol or drugs she has 12th grade education and she has work in a different places like [employer] before she retired    Patient has sister is a good support  Diagnosis anxiety  Depression  Anemia  Chronic cellulitis of lower limbs  Vitamin-D deficiency    Chief Complaints:  Depression    Family History: No family history on file  Sister has depression    Legal History:  Patient denies    Mental Status Exam:  68years old white female is resting in the bed alert awake oriented x3 affect flat withdrawn she looks anxious at times  Sad depressed poor sleep poor appetite she cries sometime feel sad pessimistic negative  Memory intact  Patient denies auditory or visual hallucinations  Patient denies suicidal or homicidal ideation  No paranoia no delusion elucidated  Poor concentration  Patient is not lethargic  Patient is not agitated  Insight and judgments are adequate  History of Present Illness     HPI: Janusz Lauren is a 68y o  year old female who presents with cellulitis of lower limbs    Consults      Historical Information   Past Psychiatric History:  Patient gives a long psych history of depression and anxiety she used to see a psychiatrist and therapist at mental health clinic 40+ years ago and she was taking the medications but she does not remember the name  Currently patient is not seen a psychiatrist but she is getting her depression medications from the family physician since long time and she is suffering from depression since many years  Patient denies psychiatric admissions in the past   Patient denies suicide attempts in the past   Patient denies history of drug and alcohol abuse no IV drug abuse no legal problems in the past   Currently patient is on Lexapro 10 mg daily Wellbutrin  mg daily and BuSpar 15 mg p o  B i d   Patient is not under psychiatric care at this time  Past Medical History:   Diagnosis Date    Anemia     Anxiety     Hypotension      No past surgical history on file    Social History   Social History     Substance and Sexual Activity   Alcohol Use Not on file     Social History     Substance and Sexual Activity   Drug Use Not on file     Social History     Tobacco Use   Smoking Status Former Smoker   Smokeless Tobacco Never Used       Meds/Allergies   current meds:   Current Facility-Administered Medications   Medication Dose Route Frequency    acetaminophen (TYLENOL) tablet 650 mg  650 mg Oral Q6H PRN    buPROPion (WELLBUTRIN XL) 24 hr tablet 300 mg  300 mg Oral Daily    busPIRone (BUSPAR) tablet 15 mg  15 mg Oral BID    cefepime (MAXIPIME) IVPB (premix) 1,000 mg 50 mL  1,000 mg Intravenous Q12H    cholecalciferol (VITAMIN D3) tablet 2,000 Units  2,000 Units Oral Daily    cyclobenzaprine (FLEXERIL) tablet 5 mg  5 mg Oral TID PRN    diphenhydrAMINE (BENADRYL) injection 25 mg  25 mg Intravenous Q6H PRN    escitalopram (LEXAPRO) tablet 10 mg  10 mg Oral Daily    ferrous sulfate tablet 325 mg  325 mg Oral Daily With Breakfast    heparin (porcine) subcutaneous injection 5,000 Units  5,000 Units Subcutaneous Q8H [address]    lactated ringers infusion  75 mL/hr Intravenous Continuous    lidocaine (LIDODERM) 5 % patch 1 patch  1 patch Topical Daily    midodrine (PROAMATINE) tablet 10 mg  10 mg Oral TID AC    multivitamin-minerals (CENTRUM) tablet 1 tablet  1 tablet Oral Daily    oxyCODONE (ROXICODONE) IR tablet 5 mg  5 mg Oral Q6H PRN    pravastatin (PRAVACHOL) tablet 20 mg  20 mg Oral Daily With Dinner    and PTA meds:    Medications Prior to Admission   Medication    buPROPion (ZYBAN) 150 MG 12 hr tablet    busPIRone (BUSPAR) 15 mg tablet    cholecalciferol (VITAMIN D3) 1,000 units tablet    Cyanocobalamin (B-12 COMPLIANCE INJECTION IJ)    escitalopram (LEXAPRO) 10 mg tablet    ferrous sulfate 325 (65 Fe) mg tablet    furosemide (LASIX) 20 mg tablet    furosemide (LASIX) 40 mg tablet    lovastatin (MEVACOR) 20 mg tablet    metoprolol tartrate (LOPRESSOR) 25 mg tablet    midodrine (PROAMATINE) 10 MG tablet    multivitamin (THERAGRAN) TABS    Potassium Chloride (KLOR-CON 10 PO)    cyclobenzaprine (FLEXERIL) 5 mg tablet     Allergies   Allergen Reactions    Aspirin     Cephalosporins      Has tolerated cefepime    Penicillins Other (See Comments)     Unknown reaction during childhood   Valium [Diazepam]     Ciprofloxacin Rash    Sulfa Antibiotics Rash     Itching, rash    Vancomycin Rash       Objective   Vitals: Blood pressure (!) 100/49, pulse 73, temperature 97 9 °F (36 6 °C), temperature source Tympanic, resp  rate 18, height 4' 11" (1 499 m), weight 67 1 kg (148 lb), SpO2 97 %        Routine Lab Results:   Admission on 07/26/2020   Component Date Value Ref Range Status    WBC 07/26/2020 12 80* 4 31 - 10 16 Thousand/uL Final    RBC 07/26/2020 3 19* 3 81 - 5 12 Million/uL Final    Hemoglobin 07/26/2020 9 0* 11 5 - 15 4 g/dL Final    Hematocrit 07/26/2020 28 6* 34 8 - 46 1 % Final    MCV 07/26/2020 90  82 - 98 fL Final    MCH 07/26/2020 28 2  26 8 - 34 3 pg Final    MCHC 07/26/2020 31 5  31 4 - 37 4 g/dL Final    RDW 07/26/2020 14 5  11 6 - 15 1 % Final    MPV 07/26/2020 10 3  8 9 - 12 7 fL Final    Platelets 92/05/1194 346  149 - 390 Thousands/uL Final    Neutrophils Relative 07/26/2020 74  43 - 75 % Final    Immat GRANS % 07/26/2020 0  0 - 2 % Final    Lymphocytes Relative 07/26/2020 18  14 - 44 % Final    Monocytes Relative 07/26/2020 6  4 - 12 % Final    Eosinophils Relative 07/26/2020 2  0 - 6 % Final    Basophils Relative 07/26/2020 0  0 - 1 % Final    Neutrophils Absolute 07/26/2020 9 38* 1 85 - 7 62 Thousands/µL Final    Immature Grans Absolute 07/26/2020 0 03  0 00 - 0 20 Thousand/uL Final    Lymphocytes Absolute 07/26/2020 2 32  0 60 - 4 47 Thousands/µL Final    Monocytes Absolute 07/26/2020 0 73  0 17 - 1 22 Thousand/µL Final    Eosinophils Absolute 07/26/2020 0 31  0 00 - 0 61 Thousand/µL Final    Basophils Absolute 07/26/2020 0 03  0 00 - 0 10 Thousands/µL Final    Sodium 07/26/2020 136  133 - 145 mmol/L Final    Potassium 07/26/2020 4 0  3 5 - 5 0 mmol/L Final  Chloride 07/26/2020 106  96 - 108 mmol/L Final    CO2 07/26/2020 22  22 - 33 mmol/L Final    ANION GAP 07/26/2020 8  4 - 13 mmol/L Final    BUN 07/26/2020 41* 6 - 20 mg/dL Final    Creatinine 07/26/2020 1 39* 0 40 - 1 10 mg/dL Final    Standardized to IDMS reference method    Glucose 07/26/2020 79  65 - 140 mg/dL Final      If the patient is fasting, the ADA then defines impaired fasting glucose as > 100 mg/dL and diabetes as > or equal to 123 mg/dL  Specimen collection should occur prior to Sulfasalazine administration due to the potential for falsely depressed results  Specimen collection should occur prior to Sulfapyridine administration due to the potential for falsely elevated results   Calcium 07/26/2020 9 3  8 4 - 10 2 mg/dL Final    eGFR 07/26/2020 38  ml/min/1 73sq m Final    Protime 07/26/2020 10 3  9 5 - 12 1 seconds Final    INR 07/26/2020 0 97  0 90 - 1 10 Final    PTT 07/26/2020 31  23 - 31 seconds Final    Blood Culture 07/26/2020 No Growth at 48 hrs  Preliminary    Blood Culture 07/26/2020 No Growth at 48 hrs     Preliminary    BNP 07/26/2020 71 8  1 - 100 pg/mL Final    Troponin I 07/26/2020 <0 03  0 00 - 0 07 ng/mL Final    Wound Culture 07/27/2020 3+ Growth of Oxidase Positive gram negative evans*  Preliminary    Wound Culture 07/27/2020 3+ Growth of Proteus species*  Preliminary    Gram Stain Result 07/27/2020 No polys seen*  Preliminary    Gram Stain Result 07/27/2020 4+ Gram negative rods*  Preliminary    WBC 07/27/2020 11 96* 4 31 - 10 16 Thousand/uL Final    RBC 07/27/2020 2 92* 3 81 - 5 12 Million/uL Final    Hemoglobin 07/27/2020 8 3* 11 5 - 15 4 g/dL Final    Hematocrit 07/27/2020 26 3* 34 8 - 46 1 % Final    MCV 07/27/2020 90  82 - 98 fL Final    MCH 07/27/2020 28 4  26 8 - 34 3 pg Final    MCHC 07/27/2020 31 6  31 4 - 37 4 g/dL Final    RDW 07/27/2020 15 6* 11 6 - 15 1 % Final    MPV 07/27/2020 11 5  8 9 - 12 7 fL Final    Platelets 43/07/6492 202  149 - 390 Thousands/uL Final    Neutrophils Relative 07/27/2020 79* 43 - 75 % Final    Immat GRANS % 07/27/2020 0  0 - 2 % Final    Lymphocytes Relative 07/27/2020 13* 14 - 44 % Final    Monocytes Relative 07/27/2020 7  4 - 12 % Final    Eosinophils Relative 07/27/2020 1  0 - 6 % Final    Basophils Relative 07/27/2020 0  0 - 1 % Final    Neutrophils Absolute 07/27/2020 9 40* 1 85 - 7 62 Thousands/µL Final    Immature Grans Absolute 07/27/2020 0 03  0 00 - 0 20 Thousand/uL Final    Lymphocytes Absolute 07/27/2020 1 53  0 60 - 4 47 Thousands/µL Final    Monocytes Absolute 07/27/2020 0 79  0 17 - 1 22 Thousand/µL Final    Eosinophils Absolute 07/27/2020 0 17  0 00 - 0 61 Thousand/µL Final    Basophils Absolute 07/27/2020 0 04  0 00 - 0 10 Thousands/µL Final    Sodium 07/27/2020 139  133 - 145 mmol/L Final    Potassium 07/27/2020 3 7  3 5 - 5 0 mmol/L Final    Chloride 07/27/2020 109* 96 - 108 mmol/L Final    CO2 07/27/2020 23  22 - 33 mmol/L Final    ANION GAP 07/27/2020 7  4 - 13 mmol/L Final    BUN 07/27/2020 33* 6 - 20 mg/dL Final    Creatinine 07/27/2020 1 13* 0 40 - 1 10 mg/dL Final    Standardized to IDMS reference method    Glucose 07/27/2020 125  65 - 140 mg/dL Final      If the patient is fasting, the ADA then defines impaired fasting glucose as > 100 mg/dL and diabetes as > or equal to 123 mg/dL  Specimen collection should occur prior to Sulfasalazine administration due to the potential for falsely depressed results  Specimen collection should occur prior to Sulfapyridine administration due to the potential for falsely elevated results      Calcium 07/27/2020 8 6  8 4 - 10 2 mg/dL Final    eGFR 07/27/2020 49  ml/min/1 73sq m Final    WBC 07/28/2020 8 09  4 31 - 10 16 Thousand/uL Final    RBC 07/28/2020 2 83* 3 81 - 5 12 Million/uL Final    Hemoglobin 07/28/2020 7 9* 11 5 - 15 4 g/dL Final    Hematocrit 07/28/2020 25 7* 34 8 - 46 1 % Final    MCV 07/28/2020 91  82 - 98 fL Final   [facility] 07/28/2020 27 9  26 8 - 34 3 pg Final    MCHC 07/28/2020 30 7* 31 4 - 37 4 g/dL Final    RDW 07/28/2020 14 8  11 6 - 15 1 % Final    MPV 07/28/2020 10 7  8 9 - 12 7 fL Final    Platelets 07/78/8958 254  149 - 390 Thousands/uL Final    Neutrophils Relative 07/28/2020 70  43 - 75 % Final    Immat GRANS % 07/28/2020 0  0 - 2 % Final    Lymphocytes Relative 07/28/2020 18  14 - 44 % Final    Monocytes Relative 07/28/2020 8  4 - 12 % Final    Eosinophils Relative 07/28/2020 4  0 - 6 % Final    Basophils Relative 07/28/2020 0  0 - 1 % Final    Neutrophils Absolute 07/28/2020 5 62  1 85 - 7 62 Thousands/µL Final    Immature Grans Absolute 07/28/2020 0 02  0 00 - 0 20 Thousand/uL Final    Lymphocytes Absolute 07/28/2020 1 46  0 60 - 4 47 Thousands/µL Final    Monocytes Absolute 07/28/2020 0 63  0 17 - 1 22 Thousand/µL Final    Eosinophils Absolute 07/28/2020 0 35  0 00 - 0 61 Thousand/µL Final    Basophils Absolute 07/28/2020 0 01  0 00 - 0 10 Thousands/µL Final    Sodium 07/28/2020 140  133 - 145 mmol/L Final    Potassium 07/28/2020 3 3* 3 5 - 5 0 mmol/L Final    Chloride 07/28/2020 110* 96 - 108 mmol/L Final    CO2 07/28/2020 24  22 - 33 mmol/L Final    ANION GAP 07/28/2020 6  4 - 13 mmol/L Final    BUN 07/28/2020 27* 6 - 20 mg/dL Final    Creatinine 07/28/2020 0 97  0 40 - 1 10 mg/dL Final    Standardized to IDMS reference method    Glucose 07/28/2020 78  65 - 140 mg/dL Final      If the patient is fasting, the ADA then defines impaired fasting glucose as > 100 mg/dL and diabetes as > or equal to 123 mg/dL  Specimen collection should occur prior to Sulfasalazine administration due to the potential for falsely depressed results  Specimen collection should occur prior to Sulfapyridine administration due to the potential for falsely elevated results      Calcium 07/28/2020 8 5  8 4 - 10 2 mg/dL Final    eGFR 07/28/2020 59  ml/min/1 73sq m Final    WBC 07/29/2020 7 90  4 31 - 10 16 Thousand/uL Final  RBC 07/29/2020 2 91* 3 81 - 5 12 Million/uL Final    Hemoglobin 07/29/2020 8 0* 11 5 - 15 4 g/dL Final    Hematocrit 07/29/2020 26 6* 34 8 - 46 1 % Final    MCV 07/29/2020 91  82 - 98 fL Final    MCH 07/29/2020 27 5  26 8 - 34 3 pg Final    MCHC 07/29/2020 30 1* 31 4 - 37 4 g/dL Final    RDW 07/29/2020 15 0  11 6 - 15 1 % Final    MPV 07/29/2020 11 0  8 9 - 12 7 fL Final    Platelets 14/38/7322 260  149 - 390 Thousands/uL Final    Neutrophils Relative 07/29/2020 69  43 - 75 % Final    Immat GRANS % 07/29/2020 0  0 - 2 % Final    Lymphocytes Relative 07/29/2020 18  14 - 44 % Final    Monocytes Relative 07/29/2020 8  4 - 12 % Final    Eosinophils Relative 07/29/2020 5  0 - 6 % Final    Basophils Relative 07/29/2020 0  0 - 1 % Final    Neutrophils Absolute 07/29/2020 5 38  1 85 - 7 62 Thousands/µL Final    Immature Grans Absolute 07/29/2020 0 02  0 00 - 0 20 Thousand/uL Final    Lymphocytes Absolute 07/29/2020 1 43  0 60 - 4 47 Thousands/µL Final    Monocytes Absolute 07/29/2020 0 65  0 17 - 1 22 Thousand/µL Final    Eosinophils Absolute 07/29/2020 0 41  0 00 - 0 61 Thousand/µL Final    Basophils Absolute 07/29/2020 0 01  0 00 - 0 10 Thousands/µL Final    Sodium 07/29/2020 141  133 - 145 mmol/L Final    Potassium 07/29/2020 3 7  3 5 - 5 0 mmol/L Final    Chloride 07/29/2020 110* 96 - 108 mmol/L Final    CO2 07/29/2020 25  22 - 33 mmol/L Final    ANION GAP 07/29/2020 6  4 - 13 mmol/L Final    BUN 07/29/2020 22* 6 - 20 mg/dL Final    Creatinine 07/29/2020 0 94  0 40 - 1 10 mg/dL Final    Standardized to IDMS reference method    Glucose 07/29/2020 72  65 - 140 mg/dL Final      If the patient is fasting, the ADA then defines impaired fasting glucose as > 100 mg/dL and diabetes as > or equal to 123 mg/dL  Specimen collection should occur prior to Sulfasalazine administration due to the potential for falsely depressed results   Specimen collection should occur prior to Sulfapyridine administration due to the potential for falsely elevated results   Calcium 07/29/2020 8 5  8 4 - 10 2 mg/dL Final    eGFR 07/29/2020 61  ml/min/1 73sq m Final         Diagnosis:  Major depression recurrent  Anxiety  Noncompliance of the treatment  Insomnia  Ongoing chronic medical condition    Plan:  Continue Lexapro 10 mg daily  Continue Wellbutrin  mg daily  Continue BuSpar 15 mg p o  B i d  Psychotherapy  Psychiatric follow-up recommended with the psychiatrist and therapist after the discharge  Discussed with the resident physician  I will follow up during the hospital stay      Eric Henriquez MD

## 2020-07-29 NOTE — PLAN OF CARE
Problem: PHYSICAL THERAPY ADULT  Goal: Performs mobility at highest level of function for planned discharge setting  See evaluation for individualized goals  Outcome: Progressing  Note:   Prognosis: Fair  Problem List: Decreased strength, Decreased range of motion, Decreased endurance, Impaired balance, Decreased mobility, Obesity, Decreased skin integrity, Pain, Decreased safety awareness  Assessment: Pt cleared by RN Austin Campbell for PT treatment today with focus on gait training  Pt presents semi-supine in bed, c/o continued R knee pain however is agreeable to participate in session  Pt requires [address] 2 person for supine>short sit EOB, demonstrates improved sitting balance EOB as compared to previous session  Pt transfers STS with RW and GALEN 1 person, demonstrates fair supported standing balance ~2 minutes while dependent hygiene care completed  Pt then ambulates 8ft fwd with RW and GALEN 1 person, chair follow provided due to severely limited endurance  See above for gait deviations  Pt notably fatigued after short distance gait training however reports she surprised herself with how well she did  At end of session pt was left seated in bedside recliner chair, +chair alarm engaged and needs within reach  Care returned to RN  Continue to recommend STR upon d/c  Continue PT POC as able, as pt making progress towards therapy goals  Barriers to Discharge: Decreased caregiver support, Inaccessible home environment, Other (Comment)(pt lives at home alone)     PT Discharge Recommendation: Post-Acute Rehabilitation Services          See flowsheet documentation for full assessment

## 2020-07-29 NOTE — PHYSICAL THERAPY NOTE
PHYSICAL THERAPY TREATMENT    NAME:  Philip Gonsales  DATE: 07/29/20    AGE:   68 y o  Mrn:   [id number]  Length Of Stay: 3    ADMIT DX:  Cellulitis [L03 90]  Cellulitis of lower extremity, unspecified laterality [N51 [id number]]    Past Medical History:   Diagnosis Date    Anemia     Anxiety     Hypotension      No past surgical history on file  Performed at least 2 patient identifiers during session: Name and ID bracelet       07/29/20 1209   Pain Assessment   Pain Assessment Tool Rawls-Baker FACES   Rawls-Baker FACES Pain Rating 2   Pain Location/Orientation Orientation: Right;Location: Knee   Effect of Pain on Daily Activities Limites WBing/standing, limits ambulation   Patient's Stated Pain Goal No pain   Hospital Pain Intervention(s) Medication (See MAR); Ambulation/increased activity; Elevated   Restrictions/Precautions   Weight Bearing Precautions Per Order No   Other Precautions Chair Alarm; Bed Alarm;Multiple lines;Telemetry; Fall Risk;Pain   General   Chart Reviewed Yes   Response to Previous Treatment Patient with no complaints from previous session  Family/Caregiver Present No   Cognition   Overall Cognitive Status WFL   Arousal/Participation Alert; Cooperative   Attention Attends with cues to redirect   Orientation Level Oriented X4   Memory Within functional limits   Following Commands Follows one step commands without difficulty   Subjective   Subjective "I surprised myself with how I did today "   Bed Mobility   Supine to Sit 2  Maximal assistance   Additional items Assist x 2;HOB elevated; Increased time required;Verbal cues;LE management   Additional Comments Pt was left seated in bedside recliner chair at end of session  Transfers   Sit to Stand 4  Minimal assistance   Additional items Assist x 1   Stand to Sit 4  Minimal assistance   Additional items Assist x 1   Ambulation/Elevation   Gait pattern Decreased foot clearance; Forward Flexion; Short stride; Excessively slow; Step to  (inadequate weight shifting)   Gait Assistance 4  Minimal assist   Additional items Assist x 1;Verbal cues; Tactile cues   Assistive Device Rolling walker   Distance 8ft x1   Stair Management Assistance Not tested   Balance   Static Sitting Good   Dynamic Sitting Fair   Static Standing Fair   Dynamic Standing Fair   Ambulatory Fair   Endurance Deficit   Endurance Deficit Yes   Endurance Deficit Description Pt easily fatigued with minimal activity  Activity Tolerance   Activity Tolerance Patient limited by fatigue;Patient limited by pain   Nurse Made Aware Yes, spoke with RN Memory Otero    Assessment   Prognosis Fair   Problem List Decreased strength;Decreased range of motion;Decreased endurance; Impaired balance;Decreased mobility;Obesity; Decreased skin integrity;Pain;Decreased safety awareness   Assessment Pt cleared by RN Memory Otero for PT treatment today with focus on gait training  Pt presents semi-supine in bed, c/o continued R knee pain however is agreeable to participate in session  Pt requires [address] 2 person for supine>short sit EOB, demonstrates improved sitting balance EOB as compared to previous session  Pt transfers STS with RW and GALEN 1 person, demonstrates fair supported standing balance ~2 minutes while dependent hygiene care completed  Pt then ambulates 8ft fwd with RW and GALEN 1 person, chair follow provided due to severely limited endurance  See above for gait deviations  Pt notably fatigued after short distance gait training however reports she surprised herself with how well she did  At end of session pt was left seated in bedside recliner chair, +chair alarm engaged and needs within reach  Care returned to RN  Continue to recommend STR upon d/c  Continue PT POC as able, as pt making progress towards therapy goals  Barriers to Discharge Decreased caregiver support; Inaccessible home environment; Other (Comment)  (pt lives at home alone)   Goals   Patient Goals "to get stronger"    STG Expiration Date 08/10/20   Short Term Goal #1 1  Pt will complete all bed mobility in hospital bed with GALEN 1 person, in order to promote OOB mobility and decrease burden of care  2  Pt will complete all transfers with [facility] 1 person and use of RW, to increase ambulation ability  3  Pt will ambulate >25ft with RW and GALEN 1 person  4  Pt will tolerate >3hrs OOB in recliner chair with good endurance evidenced by no significant change in vitals  5  Pt will improve Barthel Index score to >/= 35/100 in order to increase independence and decrease burden of care  6  PT to assess elevations/ramp negotiation as able and appropriate, for pt to enter her home  7  Pt will demonstrate independence with LE HEP for strengthening, improved circulation, and edema management  PT Treatment Day 2   Plan   Treatment/Interventions Functional transfer training;LE strengthening/ROM; Elevations; Therapeutic exercise; Bed mobility;Gait training;Spoke to MD;Spoke to nursing;Spoke to case management;OT   Progress Progressing toward goals   PT Frequency 5x/wk   Recommendation   PT Discharge Recommendation Post-Acute Rehabilitation Services   Equipment Recommended Walker       Time in: 6199  Time out: 1209  Total treatment time: 24 minutes    Lolita Parrish PT, DPT  7/29/2020

## 2020-07-29 NOTE — SOCIAL WORK
SW advised pt that [facility] does not have any beds available  Pt states second preference is -[facility] (does not want [facility])  [facility] referral sent  Per resident, awaiting culture results to determine ABX needs

## 2020-07-29 NOTE — ASSESSMENT & PLAN NOTE
Patient presents with worsening swelling and erythema of bilateral lower extremities  Serous discharge from bilateral lower extremities>> discharge seems to be improving  Leukocytosis-12 80>>8 09>7 9  Blood cultures> no growth  MRSA screen ordered  Wound cultures> Proteus and oxidase positive Gram-negative evans  Patient is afebrile since admission, although patient was hypotensive, responded to fluids  Continue midodrine  Previous wound cultures positive for Pseudomonas    Continue cefepime> day 4  Flagyl has been discontinued  Infectious disease> recommends total 10 days course of antibiotics,Once sensitivities back

## 2020-07-30 VITALS
OXYGEN SATURATION: 98 % | RESPIRATION RATE: 18 BRPM | WEIGHT: 148 LBS | DIASTOLIC BLOOD PRESSURE: 50 MMHG | TEMPERATURE: 97.6 F | SYSTOLIC BLOOD PRESSURE: 90 MMHG | BODY MASS INDEX: 29.84 KG/M2 | HEIGHT: 59 IN | HEART RATE: 92 BPM

## 2020-07-30 LAB
ANION GAP SERPL CALCULATED.3IONS-SCNC: 5 MMOL/L (ref 4–13)
ATRIAL RATE: 93 BPM
BACTERIA WND AEROBE CULT: ABNORMAL
BASOPHILS # BLD AUTO: 0.02 THOUSANDS/ΜL (ref 0–0.1)
BASOPHILS NFR BLD AUTO: 0 % (ref 0–1)
BUN SERPL-MCNC: 19 MG/DL (ref 6–20)
CALCIUM SERPL-MCNC: 8.7 MG/DL (ref 8.4–10.2)
CHLORIDE SERPL-SCNC: 107 MMOL/L (ref 96–108)
CO2 SERPL-SCNC: 27 MMOL/L (ref 22–33)
CREAT SERPL-MCNC: 0.85 MG/DL (ref 0.4–1.1)
EOSINOPHIL # BLD AUTO: 0.55 THOUSAND/ΜL (ref 0–0.61)
EOSINOPHIL NFR BLD AUTO: 7 % (ref 0–6)
ERYTHROCYTE [DISTWIDTH] IN BLOOD BY AUTOMATED COUNT: 15 % (ref 11.6–15.1)
GFR SERPL CREATININE-BSD FRML MDRD: 69 ML/MIN/1.73SQ M
GLUCOSE SERPL-MCNC: 83 MG/DL (ref 65–140)
GRAM STN SPEC: ABNORMAL
GRAM STN SPEC: ABNORMAL
HCT VFR BLD AUTO: 29.1 % (ref 34.8–46.1)
HGB BLD-MCNC: 8.9 G/DL (ref 11.5–15.4)
IMM GRANULOCYTES # BLD AUTO: 0.03 THOUSAND/UL (ref 0–0.2)
IMM GRANULOCYTES NFR BLD AUTO: 0 % (ref 0–2)
LYMPHOCYTES # BLD AUTO: 1.79 THOUSANDS/ΜL (ref 0.6–4.47)
LYMPHOCYTES NFR BLD AUTO: 24 % (ref 14–44)
MCH RBC QN AUTO: 28 PG (ref 26.8–34.3)
MCHC RBC AUTO-ENTMCNC: 30.6 G/DL (ref 31.4–37.4)
MCV RBC AUTO: 92 FL (ref 82–98)
MONOCYTES # BLD AUTO: 0.59 THOUSAND/ΜL (ref 0.17–1.22)
MONOCYTES NFR BLD AUTO: 8 % (ref 4–12)
NEUTROPHILS # BLD AUTO: 4.43 THOUSANDS/ΜL (ref 1.85–7.62)
NEUTS SEG NFR BLD AUTO: 61 % (ref 43–75)
P AXIS: 37 DEGREES
PLATELET # BLD AUTO: 241 THOUSANDS/UL (ref 149–390)
PMV BLD AUTO: 10.1 FL (ref 8.9–12.7)
POTASSIUM SERPL-SCNC: 3.7 MMOL/L (ref 3.5–5)
PR INTERVAL: 187 MS
QRS AXIS: 62 DEGREES
QRSD INTERVAL: 106 MS
QT INTERVAL: 376 MS
QTC INTERVAL: 466 MS
RBC # BLD AUTO: 3.18 MILLION/UL (ref 3.81–5.12)
SARS-COV-2 N GENE RESP QL NAA+PROBE: NEGATIVE
SODIUM SERPL-SCNC: 139 MMOL/L (ref 133–145)
T WAVE AXIS: 46 DEGREES
VENTRICULAR RATE: 92 BPM
WBC # BLD AUTO: 7.41 THOUSAND/UL (ref 4.31–10.16)

## 2020-07-30 PROCEDURE — 97116 GAIT TRAINING THERAPY: CPT

## 2020-07-30 PROCEDURE — 97530 THERAPEUTIC ACTIVITIES: CPT

## 2020-07-30 PROCEDURE — 99239 HOSP IP/OBS DSCHRG MGMT >30: CPT | Performed by: INTERNAL MEDICINE

## 2020-07-30 PROCEDURE — 93010 ELECTROCARDIOGRAM REPORT: CPT | Performed by: INTERNAL MEDICINE

## 2020-07-30 PROCEDURE — 85025 COMPLETE CBC W/AUTO DIFF WBC: CPT | Performed by: INTERNAL MEDICINE

## 2020-07-30 PROCEDURE — 80048 BASIC METABOLIC PNL TOTAL CA: CPT | Performed by: INTERNAL MEDICINE

## 2020-07-30 PROCEDURE — 97110 THERAPEUTIC EXERCISES: CPT

## 2020-07-30 PROCEDURE — 93005 ELECTROCARDIOGRAM TRACING: CPT

## 2020-07-30 RX ORDER — POTASSIUM CHLORIDE 20 MEQ/1
20 TABLET, EXTENDED RELEASE ORAL ONCE
Status: COMPLETED | OUTPATIENT
Start: 2020-07-30 | End: 2020-07-30

## 2020-07-30 RX ORDER — CEFEPIME HYDROCHLORIDE 1 G/50ML
1000 INJECTION, SOLUTION INTRAVENOUS EVERY 12 HOURS
Qty: 500 ML | Refills: 0 | Status: SHIPPED | OUTPATIENT
Start: 2020-07-30 | End: 2020-08-04

## 2020-07-30 RX ORDER — LORAZEPAM 0.5 MG/1
0.5 TABLET ORAL EVERY 8 HOURS PRN
Qty: 15 TABLET | Refills: 0 | Status: SHIPPED | OUTPATIENT
Start: 2020-07-30 | End: 2020-07-31

## 2020-07-30 RX ORDER — LIDOCAINE 50 MG/G
1 PATCH TOPICAL DAILY
Qty: 5 PATCH | Refills: 2 | Status: SHIPPED | OUTPATIENT
Start: 2020-07-31

## 2020-07-30 RX ADMIN — Medication 1 TABLET: at 08:46

## 2020-07-30 RX ADMIN — MELATONIN 2000 UNITS: at 08:46

## 2020-07-30 RX ADMIN — SODIUM CHLORIDE, SODIUM LACTATE, POTASSIUM CHLORIDE, AND CALCIUM CHLORIDE 75 ML/HR: .6; .31; .03; .02 INJECTION, SOLUTION INTRAVENOUS at 01:55

## 2020-07-30 RX ADMIN — MIDODRINE HYDROCHLORIDE 10 MG: 5 TABLET ORAL at 11:30

## 2020-07-30 RX ADMIN — ESCITALOPRAM OXALATE 10 MG: 10 TABLET ORAL at 08:46

## 2020-07-30 RX ADMIN — BUSPIRONE HYDROCHLORIDE 15 MG: 5 TABLET ORAL at 08:46

## 2020-07-30 RX ADMIN — CEFEPIME HYDROCHLORIDE 1000 MG: 1 INJECTION, SOLUTION INTRAVENOUS at 08:48

## 2020-07-30 RX ADMIN — LIDOCAINE 1 PATCH: 50 PATCH CUTANEOUS at 08:47

## 2020-07-30 RX ADMIN — HEPARIN SODIUM 5000 UNITS: 5000 INJECTION INTRAVENOUS; SUBCUTANEOUS at 05:33

## 2020-07-30 RX ADMIN — FERROUS SULFATE TAB 325 MG (65 MG ELEMENTAL FE) 325 MG: 325 (65 FE) TAB at 08:46

## 2020-07-30 RX ADMIN — MIDODRINE HYDROCHLORIDE 10 MG: 5 TABLET ORAL at 08:46

## 2020-07-30 RX ADMIN — OXYCODONE HYDROCHLORIDE 5 MG: 5 TABLET ORAL at 08:47

## 2020-07-30 RX ADMIN — POTASSIUM CHLORIDE 20 MEQ: 1500 TABLET, EXTENDED RELEASE ORAL at 08:46

## 2020-07-30 NOTE — DISCHARGE INSTR - AVS FIRST PAGE
Your advised to follow-up with cardiology within 1 week in regards to severe aortic stenosis  Follow-up with primary care within 2-3 weeks  New medications-cefepime 1 g, twice daily for 5 days  Patient has tolerated cefepime during the hospital course                                Lidocaine patch for pain control                                    Medications discontinued-Lasix 40 mg every morning, Lasix 20 mg at bedtime    Advised to monitor blood pressure, if blood pressure permits, can resume Lasix    Diet-cardiac

## 2020-07-30 NOTE — PLAN OF CARE
Problem: PHYSICAL THERAPY ADULT  Goal: Performs mobility at highest level of function for planned discharge setting  See evaluation for individualized goals  Outcome: Progressing  Note:   Prognosis: Fair  Problem List: Decreased strength, Decreased range of motion, Decreased endurance, Impaired balance, Decreased mobility, Decreased safety awareness, Obesity, Decreased skin integrity, Pain  Assessment: Pt cleared by GEO Gaona for PT treatment today, with focus on gait training  Pt presents semi-supine in bed, c/o continued R knee pain however is eager and agreeable to participate  Pt continues to require [address] 2 person for supine>short sit EOB  Pt demonstrates good supported standing balance and improve standing tolerance as compared to previous session  Pt transfers STS from EOB with GALEN and RW, then ambulates 16ft with RW and GALEN  Pt making good progress towards therapy goals, transfer goal met and updated  At end of session pt was left seated in bedside chair, all needs in place and lines intact, +chair alarm engaged  RN aware of pt status  Continue to recommend STR upon d/c in order to maximize functional outcomes  Will continue PT POC as able  Barriers to Discharge: Inaccessible home environment, Decreased caregiver support(pt lives at home alone )     PT Discharge Recommendation: [address]          See flowsheet documentation for full assessment

## 2020-07-30 NOTE — PROGRESS NOTES
Patient examined spoke with the nurse patient is alert resting in the bed communicates slowly she is not in distress  Patient offers no new complaint  Affect looks brighter  Patient is chronically depressed and she has a poor compliance with medical care at home  She has chronic cellulitis of both legs  Patient needs help with ADL care  Patient has trouble in ambulation  Patient has been suffering from depression since long time and she has been on depression medications from the family physician since long time  Patient is happy with current medications  No side effects of Wellbutrin XL Lexapro and BuSpar noted  Nurse reported no behavior problem  Patient is able to express her feelings well  Affect flat withdrawn psychomotor retardation speech slow and low in tone  No evidence of hallucinations  No psychosis  Patient denies feeling suicidal   Therapy done with good response  Continue her medications as ordered and follow-up  Patient will benefit from psychiatric follow-up after the discharge

## 2020-07-30 NOTE — DISCHARGE INSTRUCTIONS
Anxiety   WHAT YOU SHOULD KNOW:   Anxiety is a condition that causes you to feel excessive worry, uneasiness, or fear  Family or work stress, smoking, caffeine, and alcohol can increase your risk for anxiety  Certain medicines or health conditions can also increase your risk  Anxiety may begin gradually, and can become a long-term condition if it is not managed or treated  AFTER YOU LEAVE:   Medicines:   · Medicines  can help you feel more calm and relaxed, and decrease your symptoms  · Take your medicine as directed  Contact your healthcare provider if you think your medicine is not helping or if you have side effects  Tell him if you are allergic to any medicine  Keep a list of the medicines, vitamins, and herbs you take  Include the amounts, and when and why you take them  Bring the list or the pill bottles to follow-up visits  Carry your medicine list with you in case of an emergency  Follow up with your healthcare provider within 2 weeks or as directed:  Write down your questions so you remember to ask them during your visits  Manage anxiety:   · Go to counseling as directed  Cognitive behavioral therapy can help you understand and change how you react to events that trigger your symptoms  · Find ways to manage your symptoms  Activities such as exercise, meditation, or listening to music can help you relax  · Practice deep breathing  Breathing can change how your body reacts to stress  Focus on taking slow, deep breaths several times a day, or during an anxiety attack  Breathe in through your nose, and out through your mouth  · Avoid caffeine  Caffeine can make your symptoms worse  Avoid foods or drinks that are meant to increase your energy level  · Limit or avoid alcohol  Ask your healthcare provider if alcohol is safe for you  You may not be able to drink alcohol if you take certain anxiety or depression medicines  Limit alcohol to 1 drink per day if you are a woman   Limit alcohol to 2 drinks per day if you are a man  A drink of alcohol is 12 ounces of beer, 5 ounces of wine, or 1½ ounces of liquor  Contact your healthcare provider if:   · Your symptoms get worse or do not get better with treatment  · You think your medicine may be causing side effects  · Your anxiety keeps you from doing your regular daily activities  · You have new symptoms since your last visit  · You have questions or concerns about your condition or care  Seek care immediately or call 911 if:   · You have chest pain, tightness, or heaviness that may spread to your shoulders, arms, jaw, neck, or back  · You feel like hurting yourself or someone else  · You feel dizzy, lightheaded, or faint  © 2014 [address] is for End User's use only and may not be sold, redistributed or otherwise used for commercial purposes  All illustrations and images included in CareNotes® are the copyrighted property of A D [employer] M [employer]  or [employer]  The above information is an  only  It is not intended as medical advice for individual conditions or treatments  Talk to your doctor, nurse or pharmacist before following any medical regimen to see if it is safe and effective for you  Cellulitis   WHAT YOU NEED TO KNOW:   Cellulitis is a skin infection caused by bacteria  Cellulitis may go away on its own or you may need treatment  Your healthcare provider may draw a [place] around the outside edges of your cellulitis  If your cellulitis spreads, your healthcare provider will see it outside of the [place]  DISCHARGE INSTRUCTIONS:   Call 911 if:   · You have sudden trouble breathing or chest pain  Return to the emergency department if:   · Your wound gets larger and more painful  · You feel a crackling under your skin when you touch it  · You have purple dots or bumps on your skin, or you see bleeding under your skin      · You have new swelling and pain in your legs  · The red, warm, swollen area gets larger  · You see red streaks coming from the infected area  Contact your healthcare provider if:   · You have a fever  · Your fever or pain does not go away or gets worse  · The area does not get smaller after 2 days of antibiotics  · Your skin is flaking or peeling off  · You have questions or concerns about your condition or care  Medicines:   · Antibiotics  help treat the bacterial infection  · NSAIDs , such as ibuprofen, help decrease swelling, pain, and fever  NSAIDs can cause stomach bleeding or kidney problems in certain people  If you take blood thinner medicine, always ask if NSAIDs are safe for you  Always read the medicine label and follow directions  Do not give these medicines to children under 10months of age without direction from your child's healthcare provider  · Acetaminophen  decreases pain and fever  It is available without a doctor's order  Ask how much to take and how often to take it  Follow directions  Read the labels of all other medicines you are using to see if they also contain acetaminophen, or ask your doctor or pharmacist  Acetaminophen can cause liver damage if not taken correctly  Do not use more than 4 grams (4,000 milligrams) total of acetaminophen in one day  · Take your medicine as directed  Contact your healthcare provider if you think your medicine is not helping or if you have side effects  Tell him or her if you are allergic to any medicine  Keep a list of the medicines, vitamins, and herbs you take  Include the amounts, and when and why you take them  Bring the list or the pill bottles to follow-up visits  Carry your medicine list with you in case of an emergency  Self-care:   · Elevate the area above the level of your heart  as often as you can  This will help decrease swelling and pain  Prop the area on pillows or blankets to keep it elevated comfortably       · Clean the area daily until the wound scabs over  Gently wash the area with soap and water  Pat dry  Use dressings as directed  · Place cool or warm, wet cloths on the area as directed  Use clean cloths and clean water  Leave it on the area until the cloth is room temperature  Pat the area dry with a clean, dry cloth  The cloths may help decrease pain  Prevent cellulitis:   · Do not scratch bug bites or areas of injury  You increase your risk for cellulitis by scratching these areas  · Do not share personal items, such as towels, clothing, and razors  · Clean exercise equipment  with germ-killing  before and after you use it  · Wash your hands often  Use soap and water  Wash your hands after you use the bathroom, change a child's diapers, or sneeze  Wash your hands before you prepare or eat food  Use lotion to prevent dry, cracked skin  · Wear pressure stockings as directed  You may be told to wear the stockings if you have peripheral edema  The stockings improve blood flow and decrease swelling  · Treat athlete's foot  This can help prevent the spread of a bacterial skin infection  Follow up with your healthcare provider within 3 days, or as directed: Your healthcare provider will check if your cellulitis is getting better  You may need different medicine  Write down your questions so you remember to ask them during your visits  © 2017 [address]  Information is for End User's use only and may not be sold, redistributed or otherwise used for commercial purposes  All illustrations and images included in CareNotes® are the copyrighted property of A D [employer] M , Inc  or [employer] Lance  The above information is an  only  It is not intended as medical advice for individual conditions or treatments  Talk to your doctor, nurse or pharmacist before following any medical regimen to see if it is safe and effective for you

## 2020-07-30 NOTE — DISCHARGE SUMMARY
Discharge- Miguel Mcgill 1950, 68 y o  female MRN: [id number]    Unit/Bed#: -01 Encounter: [id number]    Primary Care Provider: Kathy Mayers MD   Date and time admitted to hospital: 7/26/2020  3:03 PM        * Recurrent cellulitis of lower extremity  Assessment & Plan  Patient presents with worsening swelling and erythema of bilateral lower extremities  Blood cultures> no growth  MRSA screen>> negative  Wound cultures> Proteus species-pansensitive, Klebsiella-ESBL, Pseudomonas sensitive to cefepime  Patient is afebrile since admission, although patient was hypotensive, responded to fluids  Continue midodrine  Previous wound cultures positive for Pseudomonas  Case discussed with infectious disease, recommend that they will not treat ESBL Klebsiella> likely commensal  Patient to complete 10 days course of IV cefepime  Today is day 5 of antibiotics        Severe aortic stenosis  Assessment & Plan  Patient has severe aortic stenosis with valve area of 0 7  Follow-up with cardiology within 1 week    Lymphedema  Assessment & Plan  Chronic lymphedema bilateral lower extremities  Daily dose of Lasix is on hold  Can resume when blood pressure permits    Vitamin D deficiency  Assessment & Plan  Continue vitamin-D supplementation    Anemia  Assessment & Plan  Continue iron supplementation and B12    Depression  Assessment & Plan  Continue escitalopram and bupropion    Idiopathic hypotension  Assessment & Plan  Continue midodrine 10 mg t i d      Anxiety  Assessment & Plan  Continue buspirone and escitalopram      Discharging Physician / Practitioner: Alberto Mena MD  PCP: Kathy Mayers MD  Admission Date:   Admission Orders (From admission, onward)     Ordered        07/26/20 1726  Inpatient Admission  Once                   Discharge Date: 07/30/20    Resolved Problems  Date Reviewed: 7/29/2020          Resolved    [facility] 7/28/2020     Resolved by  Alberto Mena MD Consultations During Hospital Stay:  · Infectious Disease, Psychiatry    Procedures Performed:   · None    Significant Findings / Test Results:   · Wound cultures positive for Pseudomonas, ESBL Klebsiella, Proteus    Incidental Findings:   · None     Test Results Pending at Discharge (will require follow up): · None     Outpatient Tests Requested:  · None    Complications:  None    Reason for Admission:  Cellulitis of bilateral lower extremity    Hospital Course:     Deborah Zacarias is a 68 y o  female patient who originally presented to the hospital on 7/26/2020 due to worsening swelling and discharge from bilateral lower extremities  On admission patient was found to be hypertensive, initially patient was given fluid boluses and continued on fluids  Patient's home dose of midodrine 10 mg twice daily was continued  Although patient has a blood pressure but patient remained asymptomatic  For wounds, patient was started on cefepime and Flagyl in accordance to the previous sensitivities of the wound cultures  Wound cultures positive for Pseudomonas, ESBL Klebsiella, Proteus  Flagyl was discontinued, id advised to complete 10 days course of antibiotics with cefepime intravenous  Pain control with lidocaine  Today on exam, wounds look much better, discharge is very minimal, edema has improved, erythema has improved  Patient is stable to be discharged, physical therapy recommended rehab, patient is being discharged to rehabilitation center  Please see above list of diagnoses and related plan for additional information  Condition at Discharge: stable     Discharge Day Visit / Exam:     Subjective:  Patient seen in bed today morning, in no acute apparent distress  Alert oriented to time place and person  No fevers, no chills    No overnight events reported  Vitals: Blood Pressure: 90/50 (07/30/20 1100)  Pulse: 92 (07/30/20 0710)  Temperature: 97 6 °F (36 4 °C) (07/30/20 0710)  Temp Source: Tympanic (07/30/20 0710)  Respirations: 18 (07/30/20 0500)  Height: 4' 11" (149 9 cm) (07/26/20 2103)  Weight - Scale: 67 1 kg (148 lb) (07/26/20 2103)  SpO2: 98 % (07/30/20 0710)  Exam:   Physical Exam   Constitutional: She is oriented to person, place, and time  She appears well-developed and well-nourished  Eyes: Pupils are equal, round, and reactive to light  Neck: Normal range of motion  Cardiovascular: Normal rate and regular rhythm  Severe aortic stenosis murmur   Pulmonary/Chest: Effort normal and breath sounds normal    Abdominal: Soft  Bowel sounds are normal    Musculoskeletal: Normal range of motion  Swelling and erythema has improved  Minimal discharge  Both the legs covered with dressings  Neurological: She is alert and oriented to person, place, and time  No cranial nerve deficit  Coordination normal    Psychiatric: She has a normal mood and affect  Discharge instructions/Information to patient and family:   See after visit summary for information provided to patient and family  Provisions for Follow-Up Care:  See after visit summary for information related to follow-up care and any pertinent home health orders  Disposition:     Discharge to skilled nursing facility  For Discharges to [facility] SNF:   · Not Applicable to this Patient - Not Applicable to this Patient    Planned Readmission:  No     Discharge Statement:  I spent 25 minutes discharging the patient  This time was spent on the day of discharge  I had direct contact with the patient on the day of discharge  Greater than 50% of the total time was spent examining patient, answering all patient questions, arranging and discussing plan of care with patient as well as directly providing post-discharge instructions  Additional time then spent on discharge activities  Discharge Medications:  See after visit summary for reconciled discharge medications provided to patient and family        ** Please Note: This note has been constructed using a voice recognition system **

## 2020-07-30 NOTE — ASSESSMENT & PLAN NOTE
Patient presents with worsening swelling and erythema of bilateral lower extremities  Blood cultures> no growth  MRSA screen>> negative  Wound cultures> Proteus species-pansensitive, Klebsiella-ESBL, Pseudomonas sensitive to cefepime  Patient is afebrile since admission, although patient was hypotensive, responded to fluids  Continue midodrine  Previous wound cultures positive for Pseudomonas  Case discussed with infectious disease, recommend that they will not treat ESBL Klebsiella> likely commensal  Patient to complete 10 days course of IV cefepime    Today is day 5 of antibiotics

## 2020-07-30 NOTE — SOCIAL WORK
SW met with pt and her sister Elda this morning to review d/c plan  Both expressed gratitude with assisting with [facility] as the lift care was delivered yesterday morning  Patient informed that [address] [facility] confirmed that they can accept pt with 0 SNF days as CMS is continuing to provide exceptions due to the [place] pandemic  Both in agreement with plan  IMM#2 reviewed  Pt gave verbal understanding, signed, and declined a copy  Original placed in scan bin  PMN form completed  Nursing provided with contact information for [facility] to fax AVS and call report  [facility] to transport at 1500

## 2020-07-30 NOTE — ASSESSMENT & PLAN NOTE
Chronic lymphedema bilateral lower extremities  Daily dose of Lasix is on hold    Can resume when blood pressure permits

## 2020-07-30 NOTE — OCCUPATIONAL THERAPY NOTE
Occupational Therapy Treatment Note       07/30/20 1420   Restrictions/Precautions   Other Precautions Chair Alarm; Bed Alarm; Fall Risk   Pain Assessment   Pain Assessment Tool Pain Assessment not indicated - pt denies pain   Pain Score No Pain   Bed Mobility   Additional Comments Patient declined due to waiting for transport for transfer to rehab   Therapeutic Exercise - ROM   UE-ROM Yes   ROM- Right Upper Extremities   R Shoulder AROM; Flexion   R Elbow AROM;Elbow flexion;Elbow extension   R Position Supine   R Weight/Reps/Sets 10 reps each   RUE ROM Comment During OT treatment ttransport arrived early to pick patient up for transfer, further activity deferred   ROM - Left Upper Extremities    L Shoulder AROM; Flexion;ABduction   L Elbow AROM;Elbow flexion;Elbow extension   L Weight/Reps/Sets 10 reps each   LUE ROM Comment During OT treatment ttransport arrived early to pick patient up for transfer, further activity deferred   Cognition   Overall Cognitive Status [facility]   Arousal/Participation Alert; Cooperative   Attention Within functional limits   Orientation Level Oriented X4   Memory Within functional limits   Following Commands Follows all commands and directions without difficulty   Activity Tolerance   Activity Tolerance Patient tolerated treatment well   Assessment   Assessment Patient seen for OT treatment session this PM  Patient agreeable to supine therex only as patient waiting for transport later in the PM for discharge from hospital  Patient performed few reps of BUE therex, then OT session interruption by early arrival of transport to discharge patient   Further activity deferred, patient left at bedside with RN and transport team     Recommendation   OT Discharge Recommendation [address] License Number  Deny Done, OTR/L

## 2020-07-30 NOTE — TRANSPORTATION MEDICAL NECESSITY
Section I - General Information    Name of Patient: Arlet Rai                 : 1950    Medicare #: [id number]  Transport Date: 20 (PCS is valid for round trips on this date and for all repetitive trips in the 60-day range as noted below )  Origin: [address] [facility] [address]: [address] [place] [address]  Is the pt's stay covered under Medicare Part A (PPS/DRG)   []     Closest appropriate facility? If no, why is transport to more distant facility required? Yes  If hospice pt, is this transport related to pt's terminal illness? NA       Section II - Medical Necessity Questionnaire  Ambulance transportation is medically necessary only if other means of transport are contraindicated or would be potentially harmful to the patient  To meet this requirement, the patient must either be "bed confined" or suffer from a condition such that transport by means other than ambulance is contraindicated by the patient's condition  The following questions must be answered by the medical professional signing below for this form to be valid:    1)  Describe the MEDICAL CONDITION (physical and/or mental) of this patient AT [address] [place] [address] that requires the patient to be transported in an ambulance and why transport by other means is contraindicated by the patient's condition: Recurrent Cellulitis of Lower Extremities; Lymphedema, Ambulatory Dysfunction    2) Is the patient "bed confined" as defined below? Yes  To be "be confined" the patient must satisfy all three of the following conditions: (1) unable to get up from bed without Assistance; AND (2) unable to ambulate; AND (3) unable to sit in a chair or wheelchair  3) Can this patient safely be transported by car or wheelchair van (i e , seated during transport without a medical attendant or monitoring)?    No    4) In addition to completing questions 1-3 above, please check any of the following conditions that apply*:   *Note: supporting documentation for any boxes checked must be maintained in the patient's medical records  If hosp-hosp transfer, describe services needed at 2nd facility not available at 1st facility? Special handling/isolation/infection control precautions required   Unable to tolerate seated position for time needed to transport   Unable to sit in a chair or wheelchair due to decubitus ulcers or other wounds       Section III - Signature of Physician or Healthcare Professional  I certify that the above information is true and correct based on my evaluation of this patient, and represent that the patient requires transport by ambulance and that other forms of transport are contraindicated  I understand that this information will be used by the Centers for Medicare and Medicaid Services (CMS) to support the determination of medical necessity for ambulance services, and I represent that I have personal knowledge of the patient's condition at time of transport  []  If this box is checked, I also certify that the patient is physically or mentally incapable of signing the ambulance service's claim and that the institution with which I am affiliated has furnished care, services, or assistance to the patient  My signature below is made on behalf of the patient pursuant to 42 CFR §424 36(b)(4)  In accordance with 42 CFR §424 37, the specific reason(s) that the patient is physically or mentally incapable of signing the claim form is as follows: Lorenzo Face of Physician* or Healthcare Professional______________________________________________________________  Signature Date 07/30/20 (For scheduled repetitive transports, this form is not valid for transports performed more than 60 days after this date)    Printed Name & Credentials of Physician or Healthcare Professional (MD, DO, RN, etc )________________________________  *Form must be signed by patient's attending physician for scheduled, repetitive transports   For non-repetitive, unscheduled ambulance transports, if unable to obtain the signature of the attending physician, any of the following may sign (choose appropriate option below)  [] Physician Assistant []  Clinical Nurse Specialist []  Registered Nurse  []  Nurse Practitioner  [] Discharge Planner

## 2020-07-30 NOTE — PHYSICAL THERAPY NOTE
PHYSICAL THERAPY TREATMENT    NAME:  Doni Kumari  DATE: 07/30/20    AGE:   68 y o  Mrn:   [id number]  Length Of Stay: 4    ADMIT DX:  Cellulitis [L03 90]  Cellulitis of lower extremity, unspecified laterality [J34 497]    Past Medical History:   Diagnosis Date    Anemia     Anxiety     Hypotension      No past surgical history on file  Performed at least 2 patient identifiers during session: Name and Birthday       07/30/20 1044   Pain Assessment   Pain Assessment Tool Rawls-Baker FACES   Rawls-Baker FACES Pain Rating 4   Pain Location/Orientation Orientation: Right;Location: Knee   Pain Radiating Towards n/a   Pain Onset/Description Onset: Ongoing   Effect of Pain on Daily Activities Limits WBing and ambulation    Patient's Stated Pain Goal No pain   Hospital Pain Intervention(s) Repositioned; Ambulation/increased activity; Elevated   Restrictions/Precautions   Weight Bearing Precautions Per Order No   Other Precautions Chair Alarm; Bed Alarm; Fall Risk;Pain;Telemetry;Multiple lines   General   Family/Caregiver Present Yes  (Pt's sister (Elda) present t/o session  )   Cognition   Overall Cognitive Status WFL   Attention Within functional limits   Orientation Level Oriented X4   Memory Within functional limits   Following Commands Follows one step commands without difficulty   Bed Mobility   Supine to Sit 2  Maximal assistance   Additional items Assist x 2   Sit to Supine Unable to assess   Additional Comments Pt with improved ability to move B LEs this visit, as compared to previous visit  Continues to require maxA 2 person  Transfers   Sit to Stand 4  Minimal assistance   Additional items Assist x 1  (with RW)   Stand to Sit 4  Minimal assistance   Additional items Assist x 1  (with RW )   Additional Comments Pt completed 3 minutes of functional supported standing at RW, static, while dependent hygiene care completed  Demonstrated improve standing balance and tolerance as compared to previous session  Ambulation/Elevation   Gait pattern Decreased foot clearance; Forward Flexion; Short stride; Excessively slow; Step to  (inadequate weight shifting )   Gait Assistance 4  Minimal assist   Additional items Assist x 1;Verbal cues; Tactile cues   Assistive Device Rolling walker   Distance 16ft x1   Stair Management Assistance Not tested   Balance   Static Sitting Good   Dynamic Sitting Fair   Static Standing Good   Dynamic Standing Fair   Ambulatory Fair   Endurance Deficit   Endurance Deficit Yes   Endurance Deficit Description Pt easily fatigued with minimal activity  Activity Tolerance   Activity Tolerance Patient limited by fatigue;Patient limited by pain   Medical Staff Made Aware yes    Nurse Made Aware yes   Assessment   Prognosis Fair   Problem List Decreased strength;Decreased range of motion;Decreased endurance; Impaired balance;Decreased mobility; Decreased safety awareness; Obesity; Decreased skin integrity;Pain   Assessment Pt cleared by RN Lorenzomacario Linares for PT treatment today, with focus on gait training  Pt presents semi-supine in bed, c/o continued R knee pain however is eager and agreeable to participate  Pt continues to require [address] 2 person for supine>short sit EOB  Pt demonstrates good supported standing balance and improve standing tolerance as compared to previous session  Pt transfers STS from EOB with GALEN and RW, then ambulates 16ft with RW and GALEN  Pt making good progress towards therapy goals, transfer goal met and updated  At end of session pt was left seated in bedside chair, all needs in place and lines intact, +chair alarm engaged  RN aware of pt status  Continue to recommend STR upon d/c in order to maximize functional outcomes  Will continue PT POC as able  Barriers to Discharge Inaccessible home environment;Decreased caregiver support  (pt lives at home alone )   Goals   Patient Goals "to get stronger so i'll be okay in my own home"   STG Expiration Date 08/10/20   Short Term Goal #1 1   Pt will complete all bed mobility in hospital bed with [facility] 1 person, in order to promote OOB mobility and decrease burden of care  2  Pt will complete all transfers at SUNDAY level with use of RW, to increase ambulation ability  3  Pt will ambulate >25ft with RW and GALEN 1 person  4  Pt will tolerate >3hrs OOB in recliner chair with good endurance evidenced by no significant change in vitals  5  Pt will improve Barthel Index score to >/= 35/100 in order to increase independence and decrease burden of care  6  PT to assess elevations/ramp negotiation as able and appropriate, for pt to enter her home  7  Pt will demonstrate independence with LE HEP for strengthening, improved circulation, and edema management  PT Treatment Day 3   Plan   Treatment/Interventions Functional transfer training;LE strengthening/ROM; Therapeutic exercise; Endurance training;Bed mobility;Gait training;Spoke to MD;Spoke to nursing;Spoke to case management;OT   PT Frequency 5x/wk   Recommendation   PT Discharge Recommendation Post-Acute Rehabilitation Services   Equipment Recommended Walker       Time in: 1011  Time out: 5901  Total treatment time: 33 minutes    Verónica Taylor, PT , DPT  7/30/2020

## 2020-07-30 NOTE — PLAN OF CARE
Problem: Potential for Falls  Goal: Patient will remain free of falls  Description  INTERVENTIONS:  - Assess patient frequently for physical needs  -  Identify cognitive and physical deficits and behaviors that affect risk of falls  -  [place] fall precautions as indicated by assessment   - Educate patient/family on patient safety including physical limitations  - Instruct patient to call for assistance with activity based on assessment  - Modify environment to reduce risk of injury  - Consider OT/PT consult to assist with strengthening/mobility  Outcome: Progressing     Problem: Prexisting or High Potential for Compromised Skin Integrity  Goal: Skin integrity is maintained or improved  Description  INTERVENTIONS:  - Identify patients at risk for skin breakdown  - Assess and monitor skin integrity  - Assess and monitor nutrition and hydration status  - Monitor labs   - Assess for incontinence   - Turn and reposition patient  - Assist with mobility/ambulation  - Relieve pressure over bony prominences  - Avoid friction and shearing  - Provide appropriate hygiene as needed including keeping skin clean and dry  - Evaluate need for skin moisturizer/barrier cream  - Collaborate with interdisciplinary team   - Patient/family teaching  - Consider wound care consult   Outcome: Progressing     Problem: Nutrition/Hydration-ADULT  Goal: Nutrient/Hydration intake appropriate for improving, restoring or maintaining nutritional needs  Description  Monitor and assess patient's nutrition/hydration status for malnutrition  Collaborate with interdisciplinary team and initiate plan and interventions as ordered  Monitor patient's weight and dietary intake as ordered or per policy  Utilize nutrition screening tool and intervene as necessary  Determine patient's food preferences and provide high-protein, high-caloric foods as appropriate       INTERVENTIONS:  - Monitor oral intake, urinary output, labs, and treatment plans  - Assess nutrition and hydration status and recommend course of action  - Evaluate amount of meals eaten  - Assist patient with eating if necessary   - Allow adequate time for meals  - Recommend/ encourage appropriate diets, oral nutritional supplements, and vitamin/mineral supplements  - Order, calculate, and assess calorie counts as needed  - Recommend, monitor, and adjust tube feedings and TPN/PPN based on assessed needs  - Assess need for intravenous fluids  - Provide specific nutrition/hydration education as appropriate  - Include patient/family/caregiver in decisions related to nutrition  Outcome: Progressing     Problem: INFECTION - ADULT  Goal: Absence or prevention of progression during hospitalization  Description  INTERVENTIONS:  - Assess and monitor for signs and symptoms of infection  - Monitor lab/diagnostic results  - Monitor all insertion sites, i e  indwelling lines, tubes, and drains  - Monitor endotracheal if appropriate and nasal secretions for changes in amount and color  - [place] appropriate cooling/warming therapies per order  - Administer medications as ordered  - Instruct and encourage patient and family to use good hand hygiene technique  - Identify and instruct in appropriate isolation precautions for identified infection/condition  Outcome: Progressing  Goal: Absence of fever/infection during neutropenic period  Description  INTERVENTIONS:  - Monitor WBC    Outcome: Progressing

## 2020-07-31 ENCOUNTER — NURSING HOME VISIT (OUTPATIENT)
Dept: GERIATRICS | Facility: OTHER | Age: 70
End: 2020-07-31
Payer: MEDICARE

## 2020-07-31 DIAGNOSIS — F32.A DEPRESSION, UNSPECIFIED DEPRESSION TYPE: ICD-10-CM

## 2020-07-31 DIAGNOSIS — I35.0 SEVERE AORTIC STENOSIS: ICD-10-CM

## 2020-07-31 DIAGNOSIS — R26.2 AMBULATORY DYSFUNCTION: ICD-10-CM

## 2020-07-31 DIAGNOSIS — I89.0 LYMPHEDEMA: ICD-10-CM

## 2020-07-31 DIAGNOSIS — E55.9 VITAMIN D DEFICIENCY: ICD-10-CM

## 2020-07-31 DIAGNOSIS — D50.9 IRON DEFICIENCY ANEMIA, UNSPECIFIED IRON DEFICIENCY ANEMIA TYPE: ICD-10-CM

## 2020-07-31 DIAGNOSIS — F41.9 ANXIETY: ICD-10-CM

## 2020-07-31 DIAGNOSIS — N17.9 ACUTE KIDNEY INJURY (HCC): ICD-10-CM

## 2020-07-31 DIAGNOSIS — E53.8 VITAMIN B12 DEFICIENCY: ICD-10-CM

## 2020-07-31 DIAGNOSIS — L03.119 RECURRENT CELLULITIS OF LOWER EXTREMITY: Primary | ICD-10-CM

## 2020-07-31 DIAGNOSIS — I95.0 IDIOPATHIC HYPOTENSION: ICD-10-CM

## 2020-07-31 LAB
BACTERIA BLD CULT: NORMAL
BACTERIA BLD CULT: NORMAL

## 2020-07-31 PROCEDURE — 99306 1ST NF CARE HIGH MDM 50: CPT | Performed by: FAMILY MEDICINE

## 2020-07-31 NOTE — PROGRESS NOTES
[address]  [facility] [address]   [address] [facility]  History and Physical    NAME: Philip Gonsales  AGE: 68 y o  SEX: female [id number]    DATE OF ENCOUNTER: 7/31/2020    Code status:  CPR    Assessment and Plan     1  Recurrent cellulitis of lower extremity  -currently afebrile and no signs of sepsis noted  -Leukocytosis on admisoisn (wbc: 12 8) had downtrended  -MRSA screen negative  -Blood culture no growth to date  -Wound cultures grew proteus species-pansensitve, klebsiella-ESBL, pseudomonas sensitive to cefepime  - Initially received Cefepime and Flagyl while hospitalized Flagyl discontinued after wound cultures resulted  She is currently on day 6 of 10 of IV cefepime   - She has an extensive history of antibiotic allergies but patient tolerated Cefepime in the past without any adverse effects    - wound care ordered  - PT/OT to help strengthen her extremities and decrease risk for falls  - fall risk precautions    2  Idiopathic hypotension  -monitor blood peruse periodically  -continue midodrine 10mg TID  -Increased risk for falls and subsequent complications from falls  -fall precautions ordered    3  Severe aortic stenosis  -current denies any dyspnea, angina or syncopal episodes  -will need to follow up with cardiology  - systolic murmur heard on auscultation  - continue to monitor, high risk for falls     4  Ambulatory dysfunction  - most likely secondary to underlying lymphedema, age, cellulitis  - PT/OT ordered  - fall precautions in place    5  Lymphedema  -her dose of Lasix was held secondary to hypotension   -Will continue to monitor blood pressure and resume if needed  -Recommended elevation of her legs and compression with bandage    6  Vitamin D deficiency  - Continue Vitamin D 2000 units q daily    7  Vitamin B12 deficiency  -Continue B12 supplementation    8   Iron deficiency anemia, unspecified iron deficiency anemia type  -CBC on shows hgb of 8  9 MCV of 92, RDW: 15  -Continue Iron 325mg q daily  -Monitor for constipation    9  Depression, unspecified depression type  -continue escitalopram 10mg daily, BuSpar 15mg PO BID and Wellbutrin XL 300mg daily  -Evaluated by psychiatry while hospitalized secondary to noncompliance with her psych meds and was recommended for patient to follow up with psychiatrist outpatient      10  Anxiety  -continue escitalopram 10mg daily, BuSpar 15mg PO BID and Wellbutrin XL 300mg daily  -Evaluated by psychiatry while hospitalized secondary to noncompliance with her psych meds and was recommended for patient to follow up with psychiatrist outpatient    11  Acute kidney injury ([facility] [address] )  -Currently resolved  -most likely secondary to cellulitis vs poor oral intake vs diuretic use  -currently resolved  -on admission cr: 1 39, baseline around 0 8-0 9 which improved to Cr: 0 85 on discharge  -her Cr subsequently improved with fluids resuscitation   -BMP ordered for day 3 and day 9 as per [facility]ADOU protocol     All medications and routine orders were reviewed and updated as needed  Plan discussed with: Care team    Chief Complaint     Seen for admission at [address] [place] [address]    History of Present Illness     This is a 69 y/o female with severe aortic stenosis, lymphedema, obesity s/p gastric bypass, depression, anxiety, recurrent cellulitis, who was seen and evaluated at [facility] for subacute rehabilitation admission  She went to the ER at [facility] on 7/26/20 due to worsening swelling and discharge from lower extremities bilaterally  She was hospitalized for cellulitis of bilateral lower extremities and was started on Cefepime and flagyl empirically  Her wound cultures were positive for pseudomonas, ESBL Klebsiella, and Proteus and the flagyl was discontinued and she continued on 10 day course of Cefepime  She had an episode of hypotension while hospitalized but the episode resolved with fluids and midodrine   Psychiatry was also consulted during her hospital stay secondary to worsening depression and non compliance with psychiatric medications  Although no changes were made to her regimen, patient was advised to follow up with psychiatrist outpatient  Of note patient was recently admitted to [facility] in May 2020 for septic shock secondary to cellulitis  After treatment with IV Cefepime and Flagyl patient was discharged to [facility] for rehabilitation for 90days and was discharged home 1 month prior to recent hospitalization  She was seen and evaluated at bedside  Reports mild pain in her legs bilaterally which has improved since her hospitalization  She hopes to return home soon and be independent  She lives alone in her parents home  She is currently retired  She uses a rolling walker at home or Wheelchair  Her sister has been assisting her for her ADLs  HISTORY:  Past Medical History:   Diagnosis Date    Anemia     Anxiety     Hypotension      No family history on file  Social History     Socioeconomic History    Marital status:       Spouse name: Not on file    Number of children: Not on file    Years of education: Not on file    Highest education level: Not on file   Occupational History    Not on file   Social Needs    Financial resource strain: Not on file    Food insecurity:     Worry: Not on file     Inability: Not on file    Transportation needs:     Medical: Not on file     Non-medical: Not on file   Tobacco Use    Smoking status: Former Smoker    Smokeless tobacco: Never Used   Substance and Sexual Activity    Alcohol use: Not Currently    Drug use: Not on file    Sexual activity: Not on file   Lifestyle    Physical activity:     Days per week: Not on file     Minutes per session: Not on file    Stress: Not on file   Relationships    Social connections:     Talks on phone: Not on file     Gets together: Not on file     Attends Jehovah's witness service: Not on file     Active member of club or organization: Not on file     Attends meetings of clubs or organizations: Not on file     Relationship status: Not on file    Intimate partner violence:     Fear of current or ex partner: Not on file     Emotionally abused: Not on file     Physically abused: Not on file     Forced sexual activity: Not on file   Other Topics Concern    Not on file   Social History Narrative    Not on file       Allergies: Allergies   Allergen Reactions    Aspirin     Cephalosporins      Has tolerated cefepime    Penicillins Other (See Comments)     Unknown reaction during childhood   Valium [Diazepam]     Ciprofloxacin Rash    Sulfa Antibiotics Rash     Itching, rash    Vancomycin Rash       Review of Systems     Review of Systems   Constitutional: Negative for appetite change, fatigue and fever  HENT: Negative for congestion, rhinorrhea and sore throat  Respiratory: Negative for cough, chest tightness and shortness of breath  Cardiovascular: Positive for leg swelling  Negative for chest pain and palpitations  Gastrointestinal: Negative for abdominal pain, constipation, diarrhea, nausea and vomiting  Genitourinary: Negative for difficulty urinating and dysuria  Musculoskeletal: Positive for arthralgias and myalgias  Negative for back pain  Skin: Positive for wound  Neurological: Negative for headaches  Psychiatric/Behavioral: Negative for sleep disturbance  Medications and orders     All medications reviewed and updated in Nursing Home EMR  Objective     Vitals:   BP: 129/59  HR: 76  Temp: 97 6  RR: 18  SpO2: 96%    Physical Exam   Constitutional: She is oriented to person, place, and time  She appears well-developed  No distress  Lying comfortably in bed   HENT:   Head: Normocephalic and atraumatic  Nose: Nose normal    Mouth/Throat: Oropharynx is clear and moist    Eyes: Pupils are equal, round, and reactive to light  Conjunctivae and EOM are normal  Right eye exhibits no discharge  Left eye exhibits no discharge  Neck: Normal range of motion  Neck supple  Cardiovascular: Normal rate and regular rhythm  Murmur heard  Systolic murmur heard    Pulmonary/Chest: Effort normal and breath sounds normal  No respiratory distress  Abdominal: Soft  Bowel sounds are normal  She exhibits no distension  There is no tenderness  Musculoskeletal: She exhibits edema  She exhibits no tenderness  Right knee: She exhibits no swelling and no erythema  Legs:  Neurological: She is alert and oriented to person, place, and time  Skin: Skin is warm  She is not diaphoretic  Psychiatric:   Flat affect noted   Nursing note and vitals reviewed  Pertinent Laboratory/Diagnostic Studies: The following labs/studies were reviewed please see chart or hospital paperwork for details  Wound culture: 4+ growth of pseudomonas aeuroginosa, 4+ growth of proteus mirabilis 1+ growth of klebsiella pneumoniae ESBL  Blood cultures: NGTD  BNP: 71 8  Xray knee: no acute osseous abnormality  Severe medial compartment osteoarthrosis  Small joint effusion  EKG: NSR with low voltage, qtc: 466    - Counseling Documentation: patient was counseled regarding: risks and benefits of treatment options    Case discussed with Dr Gia Hickman  Due to COVID pandemic and in order to reduce exposure, Dr Gia Hickman was present for evaluation of the patient via [facility]  Patient was informed prior to the encounter and verbalized permission for video conference       Shae Rios MD  Geriatrics Fellow

## 2020-08-03 ENCOUNTER — NURSING HOME VISIT (OUTPATIENT)
Dept: GERIATRICS | Facility: OTHER | Age: 70
End: 2020-08-03
Payer: MEDICARE

## 2020-08-03 VITALS
BODY MASS INDEX: 28.88 KG/M2 | TEMPERATURE: 97.5 F | SYSTOLIC BLOOD PRESSURE: 128 MMHG | DIASTOLIC BLOOD PRESSURE: 67 MMHG | OXYGEN SATURATION: 94 % | HEART RATE: 76 BPM | WEIGHT: 143 LBS | RESPIRATION RATE: 18 BRPM

## 2020-08-03 DIAGNOSIS — I95.0 IDIOPATHIC HYPOTENSION: ICD-10-CM

## 2020-08-03 DIAGNOSIS — N17.9 ACUTE KIDNEY INJURY (HCC): ICD-10-CM

## 2020-08-03 DIAGNOSIS — F32.A DEPRESSION, UNSPECIFIED DEPRESSION TYPE: ICD-10-CM

## 2020-08-03 DIAGNOSIS — R26.2 AMBULATORY DYSFUNCTION: ICD-10-CM

## 2020-08-03 DIAGNOSIS — I89.0 LYMPHEDEMA: ICD-10-CM

## 2020-08-03 DIAGNOSIS — F41.9 ANXIETY: ICD-10-CM

## 2020-08-03 DIAGNOSIS — L03.119 RECURRENT CELLULITIS OF LOWER EXTREMITY: Primary | ICD-10-CM

## 2020-08-03 PROCEDURE — 99309 SBSQ NF CARE MODERATE MDM 30: CPT | Performed by: PHYSICIAN ASSISTANT

## 2020-08-03 NOTE — ASSESSMENT & PLAN NOTE
- Continue Cefepime IV through 8/5/2020 to complete 10 days of IV abx treatment   - Continue local wound care   - Increase acetaminophen to 975mg PO Q8H ATC   - D/C peripheral line once IV abx complete

## 2020-08-03 NOTE — PROGRESS NOTES
[facility]  [address]  [phone]  [place]   Code 31         NAME: Lashell Machado  AGE: 68 y o  SEX: female    DATE OF ENCOUNTER: 8/3/2020     CODE STATUS: FULL CODE     Assessment and Plan     Idiopathic hypotension  - Continue midodrine 10 mg PO TID   - Continue to monitor BP at facility     Acute kidney injury [facility])  - Repeat BMP on 8/4/2020       Anxiety  - Continue buspar 15 mg PO BID   - Continue Lexapro 10 mg PO QD   - Denies SI/HI    Recurrent cellulitis of lower extremity  - Continue Cefepime IV through 8/5/2020 to complete 10 days of IV abx treatment   - Continue local wound care   - Increase acetaminophen to 975mg PO Q8H ATC   - D/C peripheral line once IV abx complete     Depression  - continue buspar  - Continue lexapro   - Denies SI/HI   - Continue wellbutrin 300mg PO QD     Lymphedema  - Continue to hold lasix due to BP still require midodrine for stabilization       Ambulatory dysfunction  - Continue PT/OT to improve functional status     Plan discussed with Dr Andria Whyte noted agreement with assessment and plan  All medications and routine orders were reviewed and updated as needed  Chief Complaint     SNF Follow-up     History of Present Illness     RF is a 67 yo CF with multiple medical comorbidities including but not limited to recurrent cellulitis, hypotension, lymphedema, AMADOU on CKD 3, and anxiety/depression interviewed and examined in collaboration with nursing for SNF follow-up  Pt notes pain not well controlled during dressing changes for BLE  She is requesting something "stronger" for pain relief  She is eating well, >/= 50% of most meals completed per facility records  She denies GI and urinary issues  She is mostly incontinent of urine  Her last BM documented was today  She has been continuing to require midodrine for hypotension  No other concerns from nursing            The patient's allergies, past medical, surgical, social and family history were reviewed and unchanged  Review of Systems     Review of Systems   Constitutional: Positive for activity change  Negative for chills and fever  HENT: Negative for sore throat  Respiratory: Negative for cough and shortness of breath  Cardiovascular: Negative for chest pain and palpitations  Gastrointestinal: Negative for abdominal distention, abdominal pain, diarrhea, nausea and vomiting  Genitourinary: Negative for difficulty urinating and dysuria  Musculoskeletal: Positive for arthralgias and gait problem  Skin: Positive for wound  Neurological: Positive for weakness  Negative for dizziness, light-headedness and headaches  Psychiatric/Behavioral: Negative for suicidal ideas  Objective     Vitals:   Vitals:    08/03/20 1436   BP: 128/67   Pulse: 76   Resp: 18   Temp: 97 5 °F (36 4 °C)   SpO2: 94%        Physical Exam   Constitutional: She is oriented to person, place, and time  No distress  Chronically ill appearing elderly female seated comfortably in chair    HENT:   Head: Normocephalic and atraumatic  Mouth/Throat: Mucous membranes are moist  No oropharyngeal exudate or posterior oropharyngeal erythema  Oropharynx is clear  Eyes: Conjunctivae are normal  Right eye exhibits no discharge  Left eye exhibits no discharge  No scleral icterus  Wears glasses   Cardiovascular: Normal rate and regular rhythm  Exam reveals no gallop and no friction rub  Murmur heard  Pulmonary/Chest: Effort normal  No stridor  No respiratory distress  She has no wheezes  She has no rhonchi  Abdominal: Bowel sounds are normal  She exhibits no distension  There is no abdominal tenderness  There is no rebound and no guarding  Protuberant abdomen   Musculoskeletal:      Right lower leg: Edema present  Left lower leg: Edema present        Comments: Able to move all 4 extremities, BLE ROM impaired due to pain and size    Neurological: She is alert and oriented to person, place, and time  Skin:   Chronic discoloration BLE, BLE wrapped in dressings  Peripheral IV in LUE    Psychiatric:   Flat affect at first due to being frustrated with medical conditions, pleasant by end of exam , cooperative during exam    Nursing note and vitals reviewed  Pertinent Laboratory/Diagnostic Studies:  Reviewed in facility chart     Current Medications   Medications reviewed and updated see facility [facility] for details        Current Outpatient Medications:     buPROPion (ZYBAN) 150 MG 12 hr tablet, Take 300 mg by mouth daily, Disp: , Rfl:     busPIRone (BUSPAR) 15 mg tablet, Take 15 mg by mouth 3 (three) times a day, Disp: , Rfl:     cefepime (MAXIPIME) 1000 mg IVPB, Infuse 50 mL (1,000 mg total) into a venous catheter every 12 (twelve) hours for 5 days Patient tolerated cefepime during hospital admission, Disp: 500 mL, Rfl: 0    cholecalciferol (VITAMIN D3) 1,000 units tablet, Take 2,000 Units by mouth daily, Disp: , Rfl:     Cyanocobalamin (B-12 COMPLIANCE INJECTION IJ), Inject as directed, Disp: , Rfl:     cyclobenzaprine (FLEXERIL) 5 mg tablet, Take 5 mg by mouth 3 (three) times a day as needed for muscle spasms, Disp: , Rfl:     escitalopram (LEXAPRO) 10 mg tablet, Take 10 mg by mouth daily, Disp: , Rfl:     ferrous sulfate 325 (65 Fe) mg tablet, Take 650 mg by mouth daily with breakfast, Disp: , Rfl:     lidocaine (LIDODERM) 5 %, Apply 1 patch topically daily Remove & Discard patch within 12 hours or as directed by MD, Disp: 5 patch, Rfl: 2    midodrine (PROAMATINE) 10 MG tablet, midodrine 10 mg tablet  TAKE 1 TABLET BY MOUTH THREE TIMES A DAY (HOLD FOR SBP ABOVE 130), Disp: , Rfl:     multivitamin (THERAGRAN) TABS, Take 1 tablet by mouth daily, Disp: , Rfl:       Chandni Bustillos PA-C  8/3/2020 5:02 PM

## 2020-08-04 ENCOUNTER — TELEPHONE (OUTPATIENT)
Dept: OTHER | Facility: OTHER | Age: 70
End: 2020-08-04

## 2020-08-04 NOTE — TELEPHONE ENCOUNTER
[phone] Linda Navarro from [facility] Pt: Ambar Suarez : 46 Msg: Pt crying because she has itching needs new order     On Call Provider Paged

## 2020-08-12 ENCOUNTER — NURSING HOME VISIT (OUTPATIENT)
Dept: GERIATRICS | Facility: OTHER | Age: 70
End: 2020-08-12
Payer: MEDICARE

## 2020-08-12 VITALS
SYSTOLIC BLOOD PRESSURE: 123 MMHG | DIASTOLIC BLOOD PRESSURE: 68 MMHG | RESPIRATION RATE: 19 BRPM | WEIGHT: 143 LBS | TEMPERATURE: 97 F | BODY MASS INDEX: 28.88 KG/M2 | OXYGEN SATURATION: 97 % | HEART RATE: 98 BPM

## 2020-08-12 DIAGNOSIS — F41.9 ANXIETY: ICD-10-CM

## 2020-08-12 DIAGNOSIS — A04.72 C. DIFFICILE COLITIS: Primary | ICD-10-CM

## 2020-08-12 DIAGNOSIS — L03.119 RECURRENT CELLULITIS OF LOWER EXTREMITY: ICD-10-CM

## 2020-08-12 DIAGNOSIS — F32.A DEPRESSION, UNSPECIFIED DEPRESSION TYPE: ICD-10-CM

## 2020-08-12 DIAGNOSIS — D50.9 IRON DEFICIENCY ANEMIA, UNSPECIFIED IRON DEFICIENCY ANEMIA TYPE: ICD-10-CM

## 2020-08-12 DIAGNOSIS — I95.0 IDIOPATHIC HYPOTENSION: ICD-10-CM

## 2020-08-12 PROCEDURE — 99309 SBSQ NF CARE MODERATE MDM 30: CPT | Performed by: PHYSICIAN ASSISTANT

## 2020-08-12 RX ORDER — METRONIDAZOLE 500 MG/1
500 TABLET ORAL EVERY 8 HOURS SCHEDULED
COMMUNITY
End: 2020-08-18 | Stop reason: SDUPTHER

## 2020-08-12 NOTE — ASSESSMENT & PLAN NOTE
- Continue buspar 15 mg PO BID  - Continue lexapro 10 mg PO QD   - Continue wellbutrin 300mg PO QD   - Denies SI/HI

## 2020-08-12 NOTE — ASSESSMENT & PLAN NOTE
- Allergy to vancomycin  - Start flagyl 500mg PO TID x 14 days   - CBC w diff and BMP on 8/13/2020   - Not medically stable for d/c on 8/14/2020 due to 6 episodes of diarrhea on 8/11/2020 and already 3-4 episodes of diarrhea on 8/12/2020   - Encourage PO hydration

## 2020-08-12 NOTE — PROGRESS NOTES
[facility]  [address]  [phone]  [place]   Code 31         NAME: Doni Kumari  AGE: 68 y o  SEX: female    DATE OF ENCOUNTER: 8/12/2020     CODE STATUS: FULL CODE     Assessment and Plan     C  difficile colitis  - Allergy to vancomycin  - Start flagyl 500mg PO TID x 14 days   - CBC w diff and BMP on 8/13/2020   - Not medically stable for d/c on 8/14/2020 due to 6 episodes of diarrhea on 8/11/2020 and already 3-4 episodes of diarrhea on 8/12/2020   - Encourage PO hydration    Idiopathic hypotension  - Continue midodrine 10 mg PO TID with hold for SBP >/= 130s  - Continue to monitor BP at facility     Depression  - Continue buspar   - Continue lexapro   - Continue Wellbutrin   - Denies SI/HI     Anxiety  - Continue buspar 15 mg PO BID  - Continue lexapro 10 mg PO QD   - Continue wellbutrin 300mg PO QD   - Denies SI/HI     Anemia  - Continue ferrous sulfate 650 mg PO QD   - CBC w diff on 8/13/2020    Recurrent cellulitis of lower extremity  - s/p IV cefepime, completed 8/5/2020  - Continue local wound care    Plan discussed with Dr Yecenia Peñaloza noted agreement with assessment and plan  '    All medications and routine orders were reviewed and updated as needed  Chief Complaint     SNF Follow-up, positive C  Diff results     History of Present Illness     RM is a 67 yo CF with multiple medical comorbidities including but not limited to CDiff colitis, idiopathic hypotension, anxiety/depression, and anemia interviewed and examined in collaboration with nursing for SNF follow-up and evaluation of positive CDiff result  Pt notes continued diarrhea  She notes poor PO intake over the past 48 hours because of the diarrhea  She denies pain at this time  She denies issues with urination at this time  She denies SI/HI  She notes dizziness is overall improving, as are her BP per facility records  Diarrhea    This is a new problem   The current episode started in the past 7 days  The problem occurs 5 to 10 times per day  The problem has been gradually worsening  The stool consistency is described as mucous and watery  The patient states that diarrhea awakens her from sleep  Associated symptoms include arthralgias  Pertinent negatives include no abdominal pain, coughing, fever or vomiting  Nothing aggravates the symptoms  Risk factors include recent antibiotic use  Treatments tried: C dif toxin ordered, one time dose immodium after specimen collected  The treatment provided no relief  The patient's allergies, past medical, surgical, social and family history were reviewed and unchanged  Review of Systems     Review of Systems   Constitutional: Negative for fever  HENT: Negative for congestion  Respiratory: Negative for cough and shortness of breath  Cardiovascular: Positive for leg swelling  Negative for chest pain and palpitations  Gastrointestinal: Positive for diarrhea  Negative for abdominal pain and vomiting  Genitourinary: Negative for difficulty urinating and dysuria  Musculoskeletal: Positive for arthralgias and gait problem  Skin:        Chronic BLE leg discolorations and wounds   Psychiatric/Behavioral: Negative for suicidal ideas  Objective     Vitals:   Vitals:    08/12/20 1233   BP: 123/68   Pulse: 98   Resp: 19   Temp: (!) 97 °F (36 1 °C)   SpO2: 97%         Physical Exam  Vitals signs and nursing note reviewed  HENT:      Mouth/Throat:      Mouth: Mucous membranes are dry  Pharynx: Oropharynx is clear  No oropharyngeal exudate or posterior oropharyngeal erythema  Eyes:      General: No scleral icterus  Right eye: No discharge  Left eye: No discharge  Conjunctiva/sclera: Conjunctivae normal       Comments: Wears glasses    Cardiovascular:      Rate and Rhythm: Normal rate and regular rhythm  Heart sounds: Murmur present  No friction rub  No gallop      Pulmonary:      Effort: Pulmonary effort is normal  No respiratory distress  Breath sounds: No stridor  No wheezing, rhonchi or rales  Abdominal:      General: There is no distension  Tenderness: There is no abdominal tenderness  There is no guarding or rebound  Comments: Increased bowel sounds    Musculoskeletal:      Right lower leg: Edema present  Left lower leg: Edema present  Skin:     Comments: Chronic BLE skin changes, wrapped in dressings   Neurological:      Mental Status: She is alert  Pertinent Laboratory/Diagnostic Studies:  8/11/2020   C DIFF TOX GENE/RFX  C DIFF RESULT INTERP (Note) Final  Compatible with ACTIVE INFECTION with C difficile  C DIFF COMMENT (Note) Final  This patient warrants treatment and should be on contact isolation  C DIFF TOX GENE, PCR Detected NDT A Final  C DIFF TOX Ag, EIA Detected NDT A Final  GDH Positive Final    8/4/2020  CBC NO DIFF  HEMOGLOBIN 9 2 g/dL 11 5-14 5 L Final  HEMATOCRIT 29 1 % 35 0-43 0 L Final  WBC 7 9 thou/cmm 4 0-10 0 Final  RBC 3 25 mill/cmm 3 70-4 70 L Final  PLATELET COUNT 808 thou/cmm 140-350 Final  MPV 9 1 fL 7 5-11 3 Final  MCV 90 fL  Final  MCH 28 3 pg 26 0-34 0 Final  MCHC 31 6 g/dL 32 0-37 0 L Final  RDW 16 8 % 12 0-16 0 H Final  BASIC METABOLIC PNL  GLUCOSE 63 mg/dL 65-99 L Final  BUN 16 mg/dL 7-25 Final  CREATININE 0 74 mg/dL 0 40-1 10 Final  SODIUM 141 mmol/L 135-145 Final  POTASSIUM 4 1 mmol/L 3 5-5 2 Final  CHLORIDE 109 mmol/L 100-109 Final  CARBON DIOXIDE 25 mmol/L 23-31 Final  CALCIUM 8 7 mg/dL 8 5-10 1 Final  ANION GAP 7 3-11 Final  GFR, CALCULATED 82 >60 Final    Current Medications   Medications reviewed and updated see facility [facility] for details        Current Outpatient Medications:     metroNIDAZOLE (FLAGYL) 500 mg tablet, Take 500 mg by mouth every 8 (eight) hours, Disp: , Rfl:     buPROPion (ZYBAN) 150 MG 12 hr tablet, Take 300 mg by mouth daily, Disp: , Rfl:     busPIRone (BUSPAR) 15 mg tablet, Take 15 mg by mouth 3 (three) times a day, Disp: , Rfl:     cholecalciferol (VITAMIN D3) 1,000 units tablet, Take 2,000 Units by mouth daily, Disp: , Rfl:     Cyanocobalamin (B-12 COMPLIANCE INJECTION IJ), Inject as directed, Disp: , Rfl:     cyclobenzaprine (FLEXERIL) 5 mg tablet, Take 5 mg by mouth 3 (three) times a day as needed for muscle spasms, Disp: , Rfl:     escitalopram (LEXAPRO) 10 mg tablet, Take 10 mg by mouth daily, Disp: , Rfl:     ferrous sulfate 325 (65 Fe) mg tablet, Take 650 mg by mouth daily with breakfast, Disp: , Rfl:     lidocaine (LIDODERM) 5 %, Apply 1 patch topically daily Remove & Discard patch within 12 hours or as directed by MD, Disp: 5 patch, Rfl: 2    midodrine (PROAMATINE) 10 MG tablet, midodrine 10 mg tablet  TAKE 1 TABLET BY MOUTH THREE TIMES A DAY (HOLD FOR SBP ABOVE 130), Disp: , Rfl:     multivitamin (THERAGRAN) TABS, Take 1 tablet by mouth daily, Disp: , Rfl:       Carlos Zapata PA-C  8/12/2020 4:51 PM

## 2020-08-18 ENCOUNTER — NURSING HOME VISIT (OUTPATIENT)
Dept: GERIATRICS | Facility: OTHER | Age: 70
End: 2020-08-18
Payer: MEDICARE

## 2020-08-18 VITALS
OXYGEN SATURATION: 96 % | TEMPERATURE: 97.7 F | RESPIRATION RATE: 19 BRPM | BODY MASS INDEX: 28.58 KG/M2 | DIASTOLIC BLOOD PRESSURE: 48 MMHG | WEIGHT: 141.5 LBS | HEART RATE: 98 BPM | SYSTOLIC BLOOD PRESSURE: 104 MMHG

## 2020-08-18 DIAGNOSIS — D50.9 IRON DEFICIENCY ANEMIA, UNSPECIFIED IRON DEFICIENCY ANEMIA TYPE: ICD-10-CM

## 2020-08-18 DIAGNOSIS — I87.313 CHRONIC VENOUS HYPERTENSION (IDIOPATHIC) WITH ULCER OF BILATERAL LOWER EXTREMITY (HCC): ICD-10-CM

## 2020-08-18 DIAGNOSIS — F32.A DEPRESSION, UNSPECIFIED DEPRESSION TYPE: ICD-10-CM

## 2020-08-18 DIAGNOSIS — L97.929 CHRONIC VENOUS HYPERTENSION (IDIOPATHIC) WITH ULCER OF BILATERAL LOWER EXTREMITY (HCC): ICD-10-CM

## 2020-08-18 DIAGNOSIS — L03.119 RECURRENT CELLULITIS OF LOWER EXTREMITY: ICD-10-CM

## 2020-08-18 DIAGNOSIS — L97.919 CHRONIC VENOUS HYPERTENSION (IDIOPATHIC) WITH ULCER OF BILATERAL LOWER EXTREMITY (HCC): ICD-10-CM

## 2020-08-18 DIAGNOSIS — F41.9 ANXIETY: ICD-10-CM

## 2020-08-18 DIAGNOSIS — I95.0 IDIOPATHIC HYPOTENSION: ICD-10-CM

## 2020-08-18 DIAGNOSIS — A04.72 C. DIFFICILE COLITIS: Primary | ICD-10-CM

## 2020-08-18 PROCEDURE — 99316 NF DSCHRG MGMT 30 MIN+: CPT | Performed by: PHYSICIAN ASSISTANT

## 2020-08-18 RX ORDER — METRONIDAZOLE 500 MG/1
500 TABLET ORAL EVERY 8 HOURS SCHEDULED
Qty: 24 TABLET | Refills: 0 | Status: SHIPPED | OUTPATIENT
Start: 2020-08-18 | End: 2020-08-26

## 2020-08-18 RX ORDER — BUPROPION HYDROCHLORIDE 150 MG/1
300 TABLET, EXTENDED RELEASE ORAL DAILY
Qty: 60 TABLET | Refills: 0 | Status: SHIPPED | OUTPATIENT
Start: 2020-08-18 | End: 2020-09-25 | Stop reason: HOSPADM

## 2020-08-18 RX ORDER — FERROUS SULFATE 325(65) MG
650 TABLET ORAL
Qty: 60 TABLET | Refills: 0 | Status: SHIPPED | OUTPATIENT
Start: 2020-08-18 | End: 2020-10-02 | Stop reason: SDUPTHER

## 2020-08-18 RX ORDER — MIDODRINE HYDROCHLORIDE 10 MG/1
TABLET ORAL
Qty: 90 TABLET | Refills: 0 | Status: SHIPPED | OUTPATIENT
Start: 2020-08-18 | End: 2020-10-02 | Stop reason: SDUPTHER

## 2020-08-18 RX ORDER — BUSPIRONE HYDROCHLORIDE 15 MG/1
15 TABLET ORAL 2 TIMES DAILY
Qty: 60 TABLET | Refills: 0 | Status: SHIPPED | OUTPATIENT
Start: 2020-08-18 | End: 2020-10-02 | Stop reason: SDUPTHER

## 2020-08-18 RX ORDER — ESCITALOPRAM OXALATE 10 MG/1
10 TABLET ORAL DAILY
Qty: 30 TABLET | Refills: 0 | Status: SHIPPED | OUTPATIENT
Start: 2020-08-18 | End: 2020-10-02 | Stop reason: SDUPTHER

## 2020-08-18 RX ORDER — CYCLOBENZAPRINE HCL 5 MG
5 TABLET ORAL 2 TIMES DAILY PRN
Qty: 10 TABLET | Refills: 0 | Status: SHIPPED | OUTPATIENT
Start: 2020-08-18

## 2020-08-18 NOTE — PROGRESS NOTES
[facility]  [address]  [phone]  [address]: [place]    NAME: Donald Suarez  AGE: 68 y o  SEX: female  DATE OF ADMISSION: 7/30/2020 DATE OF DISCHARGE:8/20/2020   DISCHARGE DISPOSITION: Home   Reason for admission: Patient was admitted from [facility]  for rehabilitation after hospitalization for recurrent BLE cellulitis  Course of stay: Patient was admitted to [facility] for rehabilitation due to recurrent BLE cellulitis  Significant events during the stay include Cdiff infection on 8/12/2020 currently taking vancomycin  The patient participated in PT/OT  Today, pt notes she is doing well  She notes her diarrhea from last week has much improved  She denies loose stools at this time  She improved from having >/6 episodes of diarrhea daily to 3 BM yesterday  She notes her appetite has improved  She completes 50%-75% of each meal on average  She notes staying well hydrated  She denies urinary issues  She is mostly incontinent of urine  No concerns from nursing at this time  ROS:  Review of Systems   Constitutional: Positive for activity change  Negative for chills and fever  HENT: Negative for sore throat  Respiratory: Negative for cough and shortness of breath  Cardiovascular: Negative for chest pain and palpitations  Gastrointestinal: Negative for abdominal distention, abdominal pain, diarrhea, nausea and vomiting  Genitourinary: Negative for difficulty urinating and dysuria  Musculoskeletal: Positive for arthralgias and gait problem  Neurological: Positive for weakness  Negative for dizziness, light-headedness and headaches  Psychiatric/Behavioral: Negative for suicidal ideas  PHYSICAL EXAM:  Vitals:    08/18/20 1053   BP: (!) 104/48   Pulse: 98   Resp: 19   Temp: 97 7 °F (36 5 °C)   SpO2: 96%       Physical Exam  Vitals signs and nursing note reviewed  HENT:      Nose: Nose normal  No congestion or rhinorrhea  Mouth/Throat:      Mouth: Mucous membranes are moist       Pharynx: Oropharynx is clear  No oropharyngeal exudate or posterior oropharyngeal erythema  Eyes:      General: No scleral icterus  Right eye: No discharge  Left eye: No discharge  Conjunctiva/sclera: Conjunctivae normal       Comments: Wears glasses    Cardiovascular:      Rate and Rhythm: Normal rate and regular rhythm  Heart sounds: Murmur present  No friction rub  No gallop  Pulmonary:      Effort: Pulmonary effort is normal  No respiratory distress  Breath sounds: No wheezing or rales  Chest:      Chest wall: No tenderness  Abdominal:      General: Bowel sounds are normal  There is no distension  Tenderness: There is no abdominal tenderness  There is no guarding or rebound  Musculoskeletal:      Right lower leg: Edema present  Left lower leg: Edema present  Skin:     Comments: Chronic BLE skin changes, wrapped in dressings    Neurological:      Mental Status: She is alert and oriented to person, place, and time     Psychiatric:      Comments: Pleasant and cooperative during exam          Admission Diagnoses: BLE reccurent cellulitis, idiopathic hypotension, depression/anxiety     Discharge Diagnoses:      Problem List Items Addressed This Visit        Digestive    C  difficile colitis - Primary     - continue flagyl 500mg PO TID through 8/26/2020   - Labs reviewed, WBC count WNL   - Number of episodes of diarrhea decreased since diagnosis   - Continue to monitor at home          Relevant Medications    metroNIDAZOLE (FLAGYL) 500 mg tablet       Cardiovascular and Mediastinum    Idiopathic hypotension     - Continue midodrine 10 mg PO with hold for SBP >/= 130s   - Obtain home BP cuff and measure BP TID, create log, and bring to PCP to review          Relevant Medications    midodrine (PROAMATINE) 10 MG tablet    Chronic venous hypertension (idiopathic) with ulcer of bilateral lower extremity ([address] ) - Chronic condition   - Continue local wound care             Other    Anxiety     - Continue buspar 15 mg PO BID   - Continue lexapro 10 mg PO QD  - Continue wellbutrin 300mg PO QD   - Denies SI/HI          Relevant Medications    buPROPion (ZYBAN) 150 MG 12 hr tablet    busPIRone (BUSPAR) 15 mg tablet    escitalopram (LEXAPRO) 10 mg tablet    Recurrent cellulitis of lower extremity     - s/p IV cefepime   - Continue local wound care   - Follow-up outpatient with wound care          Relevant Medications    cyclobenzaprine (FLEXERIL) 5 mg tablet    ferrous sulfate 325 (65 Fe) mg tablet    Depression     - Continue buspar   - Continue wellbutrin   - Continue lexapro   - Denies SI/HI          Relevant Medications    buPROPion (ZYBAN) 150 MG 12 hr tablet    busPIRone (BUSPAR) 15 mg tablet    escitalopram (LEXAPRO) 10 mg tablet    Anemia     - Continue ferrous sulfate 650 mg PO QD  - continue to monitor CBC with PCP          Relevant Medications    ferrous sulfate 325 (65 Fe) mg tablet        Plan discussed with Dr Tere Halsted Dr Tere Halsted noted agreement with assessment and plan        Follow-up Recommendations: PCP, psychiatry    Labs and testing performed during stay:  8/13/2020   CBC WITH DIFF  HEMOGLOBIN 9 2 g/dL 11 5-14 5 L Final  HEMATOCRIT 27 6 % 35 0-43 0 L Final  WBC 7 7 thou/cmm 4 0-10 0 Final  RBC 3 07 mill/cmm 3 70-4 70 L Final  PLATELET COUNT 155 thou/cmm 140-350 Final  MPV 9 1 fL 7 5-11 3 Final  MCV 90 fL  Final  MCH 29 9 pg 26 0-34 0 Final  MCHC 33 3 g/dL 32 0-37 0 Final  RDW 17 3 % 12 0-16 0 H Final  DIFFERENTIAL TYPE AUTO Final  ABSOLUTE NEUT 5 1 thou/cmm 1 8-7 8 Final  ABSOLUTE LYMPH 1 4 thou/cmm 1 0-3 0 Final  ABSOLUTE MONO 0 5 thou/cmm 0 3-1 0 Final  ABSOLUTE EOS 0 5 thou/cmm 0 0-0 5 Final  ABSOLUTE BASO 0 1 thou/cmm 0 0-0 1 Final  NEUTROPHILS 66 % Final  LYMPHOCYTES 19 % Final  MONOCYTES 7 % Final  EOSINOPHILS 7 % Final  BASOPHILS 1 % Final  BASIC METABOLIC PNL  GLUCOSE 57 mg/dL 65-99 L Final  BUN 17 mg/dL 7-25 Final  CREATININE 0 67 mg/dL 0 40-1 10 Final  SODIUM 144 mmol/L 135-145 Final  POTASSIUM 3 7 mmol/L 3 5-5 2 Final  CHLORIDE 114 mmol/L 100-109 H Final  CARBON DIOXIDE 24 mmol/L 23-31 Final  CALCIUM 8 2 mg/dL 8 5-10 1 L Final  ANION GAP 6 3-11 Final  GFR, CALCULATED 89 >60 Final      Discharge Medications: See discharge medication list which was reviewed and signed        Current Outpatient Medications:     buPROPion (ZYBAN) 150 MG 12 hr tablet, Take 2 tablets (300 mg total) by mouth daily, Disp: 60 tablet, Rfl: 0    busPIRone (BUSPAR) 15 mg tablet, Take 1 tablet (15 mg total) by mouth 2 (two) times a day, Disp: 60 tablet, Rfl: 0    cholecalciferol (VITAMIN D3) 1,000 units tablet, Take 2,000 Units by mouth daily, Disp: , Rfl:     Cyanocobalamin (B-12 COMPLIANCE INJECTION IJ), Inject as directed, Disp: , Rfl:     cyclobenzaprine (FLEXERIL) 5 mg tablet, Take 1 tablet (5 mg total) by mouth 2 (two) times a day as needed for muscle spasms, Disp: 10 tablet, Rfl: 0    escitalopram (LEXAPRO) 10 mg tablet, Take 1 tablet (10 mg total) by mouth daily, Disp: 30 tablet, Rfl: 0    ferrous sulfate 325 (65 Fe) mg tablet, Take 2 tablets (650 mg total) by mouth daily with breakfast, Disp: 60 tablet, Rfl: 0    lidocaine (LIDODERM) 5 %, Apply 1 patch topically daily Remove & Discard patch within 12 hours or as directed by MD, Disp: 5 patch, Rfl: 2    metroNIDAZOLE (FLAGYL) 500 mg tablet, Take 1 tablet (500 mg total) by mouth every 8 (eight) hours for 8 days, Disp: 24 tablet, Rfl: 0    midodrine (PROAMATINE) 10 MG tablet, Take 1 tablet PO TID, hold for SBP >/= 130, Disp: 90 tablet, Rfl: 0    multivitamin (THERAGRAN) TABS, Take 1 tablet by mouth daily, Disp: , Rfl:       Discussion with patient/family and further instructions:  -Fall precautions  -Aspiration precautions  -Bleeding precautions  -Monitor for signs/symptoms of infection  -Medication list was reviewed and signed  -DME form was completed    Status at time of discharge: Stable    Billing based on time  Time spent on unit, 40 minutes  Time spent counseling pt on debility/condition, 30 minutes        Carlos Zapata PA-C  6/75/09334:87 PM

## 2020-08-18 NOTE — ASSESSMENT & PLAN NOTE
- continue flagyl 500mg PO TID through 8/26/2020   - Labs reviewed, WBC count WNL   - Number of episodes of diarrhea decreased since diagnosis   - Continue to monitor at home

## 2020-08-18 NOTE — ASSESSMENT & PLAN NOTE
- Continue midodrine 10 mg PO with hold for SBP >/= 130s   - Obtain home BP cuff and measure BP TID, create log, and bring to PCP to review

## 2020-08-25 ENCOUNTER — TELEPHONE (OUTPATIENT)
Dept: INFECTIOUS DISEASES | Facility: CLINIC | Age: 70
End: 2020-08-25

## 2020-08-25 NOTE — TELEPHONE ENCOUNTER
Visiting RN from [facility] called to let us know that pt's wounds on her lower extremities do not look good  RN states "that they have really opened up "    RN states that they know pt needs to get to wound care asap  They wanted to make Dr Charito Song aware  Notified Dr Charito Song, and let RN know that pt should continue with her local wound care or be scheduled w/ [facility] asap

## 2020-09-01 ENCOUNTER — TELEPHONE (OUTPATIENT)
Dept: INFECTIOUS DISEASES | Facility: CLINIC | Age: 70
End: 2020-09-01

## 2020-09-02 ENCOUNTER — OFFICE VISIT (OUTPATIENT)
Dept: INFECTIOUS DISEASES | Facility: CLINIC | Age: 70
End: 2020-09-02
Payer: MEDICARE

## 2020-09-02 VITALS — DIASTOLIC BLOOD PRESSURE: 68 MMHG | HEART RATE: 94 BPM | TEMPERATURE: 96.5 F | SYSTOLIC BLOOD PRESSURE: 100 MMHG

## 2020-09-02 DIAGNOSIS — I89.0 LYMPHEDEMA: ICD-10-CM

## 2020-09-02 DIAGNOSIS — A04.72 C. DIFFICILE COLITIS: Primary | ICD-10-CM

## 2020-09-02 DIAGNOSIS — L03.119 RECURRENT CELLULITIS OF LOWER EXTREMITY: ICD-10-CM

## 2020-09-02 DIAGNOSIS — Z88.1 ALLERGY TO MULTIPLE ANTIBIOTICS: ICD-10-CM

## 2020-09-02 PROCEDURE — 99214 OFFICE O/P EST MOD 30 MIN: CPT | Performed by: INTERNAL MEDICINE

## 2020-09-02 RX ORDER — METRONIDAZOLE 500 MG/1
500 TABLET ORAL 3 TIMES DAILY
Qty: 30 TABLET | Refills: 0 | Status: SHIPPED | OUTPATIENT
Start: 2020-09-02 | End: 2020-09-12

## 2020-09-02 RX ORDER — CEFPODOXIME PROXETIL 200 MG/1
200 TABLET, FILM COATED ORAL 2 TIMES DAILY
Qty: 14 TABLET | Refills: 0 | Status: SHIPPED | OUTPATIENT
Start: 2020-09-02 | End: 2020-09-09

## 2020-09-02 RX ORDER — METRONIDAZOLE 500 MG/1
500 TABLET ORAL 3 TIMES DAILY
COMMUNITY
End: 2020-09-02 | Stop reason: SDUPTHER

## 2020-09-02 NOTE — PROGRESS NOTES
Progress Note - Infectious Disease   Epimenio Marker 65 y o  female MRN: [id number]   Encounter: [id number]    Impression:  · Recurrent cellulitis of lower extremities:  Pt was hospitalized last month and treated for cellulitis of b/l lower extremities  7/27 superficial wound cultures of her legs with growth of 4+ Pseudomonas aeruginosa, 4+ Proteus mirabilis, and 1+ ESBL Klebsiella pneumoniae  7/27 MRSA nares screen is negative  Upon hospital discharge, pt was given continued therapy with cefepime IV x 10 day course total  Pt appears to be developing recurrent cellulitis without purulent drainage  She is afebrile and hemodynamically stable  · C diff colitis: continued diarrhea despite metronidazole  · Lymphedema: chronic  · Hypotension:  BP is currently stable  Pt is on midodrine  · History of multiple antibiotic allergies including vancomycin IV, ciprofloxacin, cephalosporin, sulfa: patient reports that her reaction to most of these medications is rash/itching  She has tolerated cefepime during her recent hospitalization last month  · History of depression, anxiety: On bupropion, buspirone, escitalopram      RECOMMENDATIONS:   · Referral to wound care center  · cefpodoxime 200mg po q12 hours for recurrent cellulitis  · If pt fails course of cefpodoxime (which lacks Pseudomonas and ESBL coverage), she will likely need IV ABx  Pt has multiple antibiotic allergies, which limits antibiotic selection  · Continue metronidazole for C diff colitis x 14 days as pt has vanco allergy  · Continue local wound care of legs with Xeroform, Kerlix, Safe-Clens and daily dressing changes (preferably twice daily if possible)  · B/L lower extremity elevation advised  · Return in 2 weeks    My recommendations were discussed with the patient in detail who verbalized understanding  Antibiotics: Flagyl po    Subjective:  64 yo woman who is a transition of care from [facility]   She was discharged from the [facility] last month and [place] [place] [facility] [place] ABx therapy and rehab  [place] [facility] she developed diarrhea and was diagnosed with C diff colitis  Her diarrhea is not as frequent, but the other day she had about 5 episodes of diarrhea  She reports poor appetite  She has a watery bowel movement about 45 min after eating  She reports ongoing leg swelling and weepy drainage  She reports shortness of breath with exertion  She is able to walk short distances with a walker  She has chronic LT hip and knee pain that limits ROM of her left leg  She denies fever, chills, sweats; no nausea, vomiting  Objective:  Vitals:  Vitals:    09/02/20 1033   BP: 100/68   Pulse: 94   Temp: (!) 96 5 °F (35 8 °C)     Physical Exam:   General Appearance:  Alert, interactive, nontoxic, no acute distress  Throat: Deferred as pt is wearing face mask   Lungs:   Clear to auscultation bilaterally; no wheezes, respirations unlabored   Heart:  S1, S2, RRR; 3/6 systolic murmur LUSB   Abdomen:   Soft, non-tender, non-distended   Extremities: B/l lower extremity edema, erythema with peeling skin, slight warmth with weepy drainage noted on ABD pads   Extremities are not hot or tender to palpation   Neurologic: AAO x3, conversant, fluent speech   Skin: No rash     Labs, Imaging, & Other studies:   8/13/20: WBC 7 7, Hb 9 2, Plt 205, creatinine 0 67  All pertinent labs and imaging studies were personally reviewed

## 2020-09-10 DIAGNOSIS — F32.A DEPRESSION, UNSPECIFIED DEPRESSION TYPE: ICD-10-CM

## 2020-09-10 DIAGNOSIS — F41.9 ANXIETY: ICD-10-CM

## 2020-09-10 DIAGNOSIS — L03.119 RECURRENT CELLULITIS OF LOWER EXTREMITY: ICD-10-CM

## 2020-09-14 RX ORDER — BUPROPION HYDROCHLORIDE 150 MG/1
300 TABLET, EXTENDED RELEASE ORAL DAILY
Qty: 60 TABLET | Refills: 0 | OUTPATIENT
Start: 2020-09-14

## 2020-09-14 RX ORDER — BUSPIRONE HYDROCHLORIDE 15 MG/1
TABLET ORAL
Qty: 60 TABLET | Refills: 0 | OUTPATIENT
Start: 2020-09-14

## 2020-09-14 RX ORDER — FERROUS SULFATE 325(65) MG
650 TABLET ORAL
Qty: 60 TABLET | Refills: 0 | OUTPATIENT
Start: 2020-09-14

## 2020-09-21 ENCOUNTER — TELEPHONE (OUTPATIENT)
Dept: INFECTIOUS DISEASES | Facility: CLINIC | Age: 70
End: 2020-09-21

## 2020-09-22 ENCOUNTER — HOSPITAL ENCOUNTER (INPATIENT)
Facility: HOSPITAL | Age: 70
LOS: 3 days | Discharge: NURSING, REHAB OR LONG-TERM CARE | DRG: 603 | End: 2020-09-25
Attending: EMERGENCY MEDICINE | Admitting: INTERNAL MEDICINE
Payer: MEDICARE

## 2020-09-22 ENCOUNTER — OFFICE VISIT (OUTPATIENT)
Dept: INFECTIOUS DISEASES | Facility: CLINIC | Age: 70
End: 2020-09-22
Payer: MEDICARE

## 2020-09-22 VITALS
SYSTOLIC BLOOD PRESSURE: 100 MMHG | HEIGHT: 59 IN | DIASTOLIC BLOOD PRESSURE: 76 MMHG | TEMPERATURE: 96.5 F | HEART RATE: 89 BPM | BODY MASS INDEX: 28.58 KG/M2

## 2020-09-22 DIAGNOSIS — Z88.1 ALLERGY TO MULTIPLE ANTIBIOTICS: ICD-10-CM

## 2020-09-22 DIAGNOSIS — L03.119 RECURRENT CELLULITIS OF LOWER EXTREMITY: ICD-10-CM

## 2020-09-22 DIAGNOSIS — I87.313 CHRONIC VENOUS HYPERTENSION (IDIOPATHIC) WITH ULCER OF BILATERAL LOWER EXTREMITY (HCC): ICD-10-CM

## 2020-09-22 DIAGNOSIS — L03.115 CELLULITIS OF RIGHT LEG: ICD-10-CM

## 2020-09-22 DIAGNOSIS — L97.919 CHRONIC VENOUS HYPERTENSION (IDIOPATHIC) WITH ULCER OF BILATERAL LOWER EXTREMITY (HCC): ICD-10-CM

## 2020-09-22 DIAGNOSIS — L97.929 CHRONIC VENOUS HYPERTENSION (IDIOPATHIC) WITH ULCER OF BILATERAL LOWER EXTREMITY (HCC): ICD-10-CM

## 2020-09-22 DIAGNOSIS — F32.A DEPRESSION, UNSPECIFIED DEPRESSION TYPE: ICD-10-CM

## 2020-09-22 DIAGNOSIS — L03.116 LEFT LEG CELLULITIS: Primary | ICD-10-CM

## 2020-09-22 DIAGNOSIS — I89.0 LYMPHEDEMA: ICD-10-CM

## 2020-09-22 DIAGNOSIS — A04.72 C. DIFFICILE COLITIS: Primary | ICD-10-CM

## 2020-09-22 LAB
ALBUMIN SERPL BCP-MCNC: 3.3 G/DL (ref 3.4–4.8)
ALP SERPL-CCNC: 67.4 U/L (ref 35–140)
ALT SERPL W P-5'-P-CCNC: 15 U/L (ref 5–54)
ANION GAP SERPL CALCULATED.3IONS-SCNC: 8 MMOL/L (ref 4–13)
APTT PPP: 33 SECONDS (ref 23–31)
AST SERPL W P-5'-P-CCNC: 18 U/L (ref 15–41)
BASOPHILS # BLD AUTO: 0.05 THOUSANDS/ΜL (ref 0–0.1)
BASOPHILS NFR BLD AUTO: 1 % (ref 0–1)
BILIRUB SERPL-MCNC: 0.23 MG/DL (ref 0.3–1.2)
BUN SERPL-MCNC: 39 MG/DL (ref 6–20)
CALCIUM ALBUM COR SERPL-MCNC: 10 MG/DL (ref 8.3–10.1)
CALCIUM SERPL-MCNC: 9.4 MG/DL (ref 8.4–10.2)
CHLORIDE SERPL-SCNC: 102 MMOL/L (ref 96–108)
CO2 SERPL-SCNC: 21 MMOL/L (ref 22–33)
CREAT SERPL-MCNC: 1.18 MG/DL (ref 0.4–1.1)
EOSINOPHIL # BLD AUTO: 0.23 THOUSAND/ΜL (ref 0–0.61)
EOSINOPHIL NFR BLD AUTO: 2 % (ref 0–6)
ERYTHROCYTE [DISTWIDTH] IN BLOOD BY AUTOMATED COUNT: 16.1 % (ref 11.6–15.1)
GFR SERPL CREATININE-BSD FRML MDRD: 46 ML/MIN/1.73SQ M
GLUCOSE SERPL-MCNC: 115 MG/DL (ref 65–140)
HCT VFR BLD AUTO: 36.6 % (ref 34.8–46.1)
HGB BLD-MCNC: 11.8 G/DL (ref 11.5–15.4)
IMM GRANULOCYTES # BLD AUTO: 0.04 THOUSAND/UL (ref 0–0.2)
IMM GRANULOCYTES NFR BLD AUTO: 0 % (ref 0–2)
INR PPP: 0.98 (ref 0.9–1.1)
LACTATE SERPL-SCNC: 1.2 MMOL/L (ref 0–2)
LYMPHOCYTES # BLD AUTO: 1.56 THOUSANDS/ΜL (ref 0.6–4.47)
LYMPHOCYTES NFR BLD AUTO: 14 % (ref 14–44)
MCH RBC QN AUTO: 28.2 PG (ref 26.8–34.3)
MCHC RBC AUTO-ENTMCNC: 32.2 G/DL (ref 31.4–37.4)
MCV RBC AUTO: 88 FL (ref 82–98)
MONOCYTES # BLD AUTO: 0.88 THOUSAND/ΜL (ref 0.17–1.22)
MONOCYTES NFR BLD AUTO: 8 % (ref 4–12)
NEUTROPHILS # BLD AUTO: 8.2 THOUSANDS/ΜL (ref 1.85–7.62)
NEUTS SEG NFR BLD AUTO: 75 % (ref 43–75)
PLATELET # BLD AUTO: 346 THOUSANDS/UL (ref 149–390)
PMV BLD AUTO: 10 FL (ref 8.9–12.7)
POTASSIUM SERPL-SCNC: 4.3 MMOL/L (ref 3.5–5)
PROT SERPL-MCNC: 7.6 G/DL (ref 6.4–8.3)
PROTHROMBIN TIME: 10.4 SECONDS (ref 9.5–12.1)
RBC # BLD AUTO: 4.18 MILLION/UL (ref 3.81–5.12)
SODIUM SERPL-SCNC: 131 MMOL/L (ref 133–145)
WBC # BLD AUTO: 10.96 THOUSAND/UL (ref 4.31–10.16)

## 2020-09-22 PROCEDURE — 87205 SMEAR GRAM STAIN: CPT | Performed by: EMERGENCY MEDICINE

## 2020-09-22 PROCEDURE — 99285 EMERGENCY DEPT VISIT HI MDM: CPT | Performed by: EMERGENCY MEDICINE

## 2020-09-22 PROCEDURE — 84145 PROCALCITONIN (PCT): CPT | Performed by: EMERGENCY MEDICINE

## 2020-09-22 PROCEDURE — 87081 CULTURE SCREEN ONLY: CPT | Performed by: EMERGENCY MEDICINE

## 2020-09-22 PROCEDURE — 36415 COLL VENOUS BLD VENIPUNCTURE: CPT | Performed by: EMERGENCY MEDICINE

## 2020-09-22 PROCEDURE — 83605 ASSAY OF LACTIC ACID: CPT | Performed by: EMERGENCY MEDICINE

## 2020-09-22 PROCEDURE — 99215 OFFICE O/P EST HI 40 MIN: CPT | Performed by: INTERNAL MEDICINE

## 2020-09-22 PROCEDURE — 80053 COMPREHEN METABOLIC PANEL: CPT | Performed by: EMERGENCY MEDICINE

## 2020-09-22 PROCEDURE — 85025 COMPLETE CBC W/AUTO DIFF WBC: CPT | Performed by: EMERGENCY MEDICINE

## 2020-09-22 PROCEDURE — 87040 BLOOD CULTURE FOR BACTERIA: CPT | Performed by: EMERGENCY MEDICINE

## 2020-09-22 PROCEDURE — 87070 CULTURE OTHR SPECIMN AEROBIC: CPT | Performed by: EMERGENCY MEDICINE

## 2020-09-22 PROCEDURE — 87077 CULTURE AEROBIC IDENTIFY: CPT | Performed by: EMERGENCY MEDICINE

## 2020-09-22 PROCEDURE — 85610 PROTHROMBIN TIME: CPT | Performed by: EMERGENCY MEDICINE

## 2020-09-22 PROCEDURE — 99284 EMERGENCY DEPT VISIT MOD MDM: CPT

## 2020-09-22 PROCEDURE — 85730 THROMBOPLASTIN TIME PARTIAL: CPT | Performed by: EMERGENCY MEDICINE

## 2020-09-22 PROCEDURE — 87186 SC STD MICRODIL/AGAR DIL: CPT | Performed by: EMERGENCY MEDICINE

## 2020-09-22 RX ORDER — BUSPIRONE HYDROCHLORIDE 5 MG/1
15 TABLET ORAL 2 TIMES DAILY
Status: DISCONTINUED | OUTPATIENT
Start: 2020-09-22 | End: 2020-09-25 | Stop reason: HOSPADM

## 2020-09-22 RX ORDER — LORAZEPAM 0.5 MG/1
0.5 TABLET ORAL
Status: DISCONTINUED | OUTPATIENT
Start: 2020-09-22 | End: 2020-09-25 | Stop reason: HOSPADM

## 2020-09-22 RX ORDER — METRONIDAZOLE 500 MG/1
TABLET ORAL
COMMUNITY
Start: 2020-09-02 | End: 2020-09-25 | Stop reason: HOSPADM

## 2020-09-22 RX ORDER — ACETAMINOPHEN 325 MG/1
650 TABLET ORAL EVERY 6 HOURS PRN
Status: DISCONTINUED | OUTPATIENT
Start: 2020-09-22 | End: 2020-09-25 | Stop reason: HOSPADM

## 2020-09-22 RX ORDER — FERROUS SULFATE 325(65) MG
325 TABLET ORAL
Status: DISCONTINUED | OUTPATIENT
Start: 2020-09-23 | End: 2020-09-25 | Stop reason: HOSPADM

## 2020-09-22 RX ORDER — ESCITALOPRAM OXALATE 10 MG/1
10 TABLET ORAL DAILY
Status: DISCONTINUED | OUTPATIENT
Start: 2020-09-23 | End: 2020-09-25 | Stop reason: HOSPADM

## 2020-09-22 RX ORDER — CYCLOBENZAPRINE HCL 10 MG
5 TABLET ORAL 2 TIMES DAILY PRN
Status: DISCONTINUED | OUTPATIENT
Start: 2020-09-22 | End: 2020-09-25 | Stop reason: HOSPADM

## 2020-09-22 RX ORDER — OXYCODONE HYDROCHLORIDE 5 MG/1
5 TABLET ORAL EVERY 6 HOURS PRN
Status: DISCONTINUED | OUTPATIENT
Start: 2020-09-22 | End: 2020-09-25 | Stop reason: HOSPADM

## 2020-09-22 RX ORDER — SACCHAROMYCES BOULARDII 250 MG
250 CAPSULE ORAL DAILY
Status: DISCONTINUED | OUTPATIENT
Start: 2020-09-23 | End: 2020-09-23

## 2020-09-22 RX ORDER — MIDODRINE HYDROCHLORIDE 5 MG/1
10 TABLET ORAL
Status: DISCONTINUED | OUTPATIENT
Start: 2020-09-22 | End: 2020-09-25 | Stop reason: HOSPADM

## 2020-09-22 RX ORDER — LORAZEPAM 0.5 MG/1
TABLET ORAL
COMMUNITY
Start: 2020-08-28 | End: 2020-09-25 | Stop reason: HOSPADM

## 2020-09-22 RX ORDER — BUPROPION HYDROCHLORIDE 150 MG/1
300 TABLET ORAL DAILY
Status: DISCONTINUED | OUTPATIENT
Start: 2020-09-23 | End: 2020-09-25 | Stop reason: HOSPADM

## 2020-09-22 RX ORDER — MELATONIN
1000 DAILY
Status: DISCONTINUED | OUTPATIENT
Start: 2020-09-23 | End: 2020-09-25 | Stop reason: HOSPADM

## 2020-09-22 RX ADMIN — LORAZEPAM 0.5 MG: 0.5 TABLET ORAL at 21:08

## 2020-09-22 RX ADMIN — OXYCODONE HYDROCHLORIDE 5 MG: 5 TABLET ORAL at 17:19

## 2020-09-22 RX ADMIN — MIDODRINE HYDROCHLORIDE 10 MG: 5 TABLET ORAL at 17:19

## 2020-09-22 RX ADMIN — Medication 125 MG: at 22:39

## 2020-09-22 RX ADMIN — MEROPENEM 1000 MG: 1 INJECTION, POWDER, FOR SOLUTION INTRAVENOUS at 18:01

## 2020-09-22 RX ADMIN — BUSPIRONE HYDROCHLORIDE 15 MG: 5 TABLET ORAL at 17:19

## 2020-09-22 NOTE — H&P
H&P- Kasi Francois 1950, 65 y o  female MRN: [id number]    Unit/Bed#: -01 Encounter: [id number]    Primary Care Provider: Andrea Morgan MD   Date and time admitted to hospital: 9/22/2020  2:59 PM        * Recurrent cellulitis of lower extremity  Assessment & Plan  Patient has a history of recurrent cellulitis of the bilateral lower extremities since 2003  Previous cultures Pseudomonas secondly sensitive, ESBL Klebsiella and Proteus  Patient received discharge for this issue in July 2020, with continual ten-day course of IV cefepime for 10 days  Patient failed cefpodoxime  Assessment of 1 today indicates greatest severity done on previous admission (please reference images in media)  - Infectious disease consult appreciated  -History of Multi-drug resistant organism from prior wound cultures: Will provide coverage with meropenem  - provide wound care  - Follow-up current wound cultures  Anxiety  Assessment & Plan  Continue antidepressant and antianxiety medications  Depression  Assessment & Plan  Continue antidepressant and antianxiety medications  Idiopathic hypotension  Assessment & Plan  Continue midodrine  Hold for SBP greater than 130 mmHg  VTE Prophylaxis: Enoxaparin (Lovenox)  / reason for no mechanical VTE prophylaxis Will hold mechanical VTE prophylaxis in the setting of bilateral lower extremity cellulitis  Code Status: Full Code  POLST: POLST form is not discussed and not completed at this time  Discussion with family:  None  Anticipated Length of Stay:  Patient will be admitted on an Inpatient basis with an anticipated length of stay of  greater than 2 midnights  Justification for Hospital Stay:  Treatment of significant bilateral lower extremity cellulitis with IV antibiotics  Total Time for Visit, including Counseling / Coordination of Care: 30 minutes    Greater than 50% of this total time spent on direct patient counseling and coordination of care     Chief Complaint:   Worsening bilateral lower extremity cellulitis  History of Present Illness:    Franny Calvillo is a 65 y o  female who presents with recurrent cellulitis  Patient has a history of cellulitis since 2003, with a recent hospitalization at [facility] for this issue in July 2020; patient underwent inpatient IV antibiotic treatment, and was discharged to skilled nurse facility with continue therapy with IV cefepime for a ten-day course  Upon return to home, patient was continued on and p o  Cefpodoxime, and provided wound care by visiting nurse aide however patient still he displayed persistent cellulitis without purulent drainage on assessment by infectious disease specialist Dr Brigida Dakins  Patient has a history of multi-drug resistant organism growth on cultures, with a previous wound culture yielding cefepime sensitive Pseudomonas, ESBL positive Klebsiella and Proteus  Patient presented with persistent cellulitis bilaterally in a circumferential distribution, extending from her digits to about 3 inches below her knee  Patient afebrile on presentation, and hemodynamically stable  Patient received a dose of meropenem IV in the ED, and transferred to the [place] service for further management of cellulitis  Review of Systems:    Review of Systems   Constitutional: Negative for chills  All other systems reviewed and are negative  Past Medical and Surgical History:     Past Medical History:   Diagnosis Date    Anemia     Anxiety     Chronic back pain     Hyperkalemia     Hypertension     Hypotension     Lymphedema     BRANDYN (obstructive sleep apnea)     Psychiatric disorder     depression       Past Surgical History:   Procedure Laterality Date    APPENDECTOMY      GASTRIC BYPASS      obesity        Meds/Allergies:    Prior to Admission medications    Medication Sig Start Date End Date Taking?  Authorizing Provider   buPROPion (ZYBAN) 150 MG 12 hr tablet Take 2 tablets (300 mg total) by mouth daily 8/18/20  Yes Gwendolyn Quarles PA-C   busPIRone (BUSPAR) 15 mg tablet Take 1 tablet (15 mg total) by mouth 2 (two) times a day 8/18/20  Yes Gwendolyn Harper PA-C   cholecalciferol (VITAMIN D3) 1,000 units tablet Take 1,000 Units by mouth daily    Yes Historical Provider, MD   Cyanocobalamin (B-12 COMPLIANCE INJECTION IJ) Inject as directed   Yes Historical Provider, MD   escitalopram (LEXAPRO) 10 mg tablet Take 1 tablet (10 mg total) by mouth daily 8/18/20  Yes Gwendolyn Quarles PA-C   ferrous sulfate 325 (65 Fe) mg tablet Take 2 tablets (650 mg total) by mouth daily with breakfast 8/18/20  Yes Gwendolyn Quarles PA-C   LORazepam (ATIVAN) 0 5 mg tablet TAKE 1 TABLET BY MOUTH EVERY 8 HOURS AS NEEDED FOR ANXIETY (1 PRIOR TO EXAM) 8/28/20  Yes Historical Provider, MD   midodrine (PROAMATINE) 10 MG tablet Take 1 tablet PO TID, hold for SBP >/= 130 8/18/20  Yes Gwendolyn Quarles PA-C   multivitamin (THERAGRAN) TABS Take 1 tablet by mouth daily   Yes Historical Provider, MD   Probiotic Product (PROBIOTIC PO) Take 2 capsules by mouth daily   Yes Historical Provider, MD   cyclobenzaprine (FLEXERIL) 5 mg tablet Take 1 tablet (5 mg total) by mouth 2 (two) times a day as needed for muscle spasms 8/18/20   Gwendolyn Quarles PA-C   lidocaine (LIDODERM) 5 % Apply 1 patch topically daily Remove & Discard patch within 12 hours or as directed by MD  Patient not taking: Reported on 9/22/2020 7/31/20   Mario Lopez MD   metroNIDAZOLE (FLAGYL) 500 mg tablet TAKE 1 TABLET BY MOUTH THREE TIMES A DAY FOR 10 DAYS 9/2/20   Historical Provider, MD     I have reviewed home medications with patient personally  Allergies: Allergies   Allergen Reactions    Aspirin     Cephalosporins      Has tolerated cefepime    Penicillins Other (See Comments)     Unknown reaction during childhood      Valium [Diazepam]     Ciprofloxacin Rash    Sulfa Antibiotics Rash     Itching, rash    Vancomycin Rash       Social History:     Marital Status:    Occupation:  Retired  Patient Pre-hospital Living Situation:  Home  Patient Pre-hospital Level of Mobility: Ambulates with RDW an GALEN  Patient Pre-hospital Diet Restrictions:  None  Substance Use History:   Social History     Substance and Sexual Activity   Alcohol Use Not Currently    Comment: Alcohol intake:   None - As per [place]      Social History     Tobacco Use   Smoking Status Former Smoker   Smokeless Tobacco Never Used   Tobacco Comment    Never smoker - As per [place]      Social History     Substance and Sexual Activity   Drug Use Not Currently    Comment: Illicit drugs:   no - As per [place]        Family History:    non-contributory    Physical Exam:     Vitals:   Blood Pressure: 102/53 (09/22/20 1639)  Pulse: 82 (09/22/20 1639)  Temperature: (!) 97 1 °F (36 2 °C) (09/22/20 1639)  Temp Source: Tympanic (09/22/20 1639)  Respirations: 18 (09/22/20 1639)  SpO2: 100 % (09/22/20 1639)    Physical Exam  Vitals signs reviewed  Constitutional:       Appearance: Normal appearance  HENT:      Head: Normocephalic and atraumatic  Mouth/Throat:      Mouth: Mucous membranes are moist    Eyes:      Extraocular Movements: Extraocular movements intact  Cardiovascular:      Rate and Rhythm: Normal rate and regular rhythm  Heart sounds: S1 normal and S2 normal    Pulmonary:      Effort: Pulmonary effort is normal       Breath sounds: Normal breath sounds  Abdominal:      Palpations: Abdomen is soft  Skin:     Findings: Erythema and rash present  Rash is crusting and vesicular  Neurological:      Mental Status: She is alert and oriented to person, place, and time  Additional Data:     Lab Results: I have personally reviewed pertinent reports        Results from last 7 days   Lab Units 09/22/20  1521   WBC Thousand/uL 10 96*   HEMOGLOBIN g/dL 11 8   HEMATOCRIT % 36 6   PLATELETS Thousands/uL 346   NEUTROS PCT % 75   LYMPHS PCT % 14   MONOS PCT % 8 EOS PCT % 2     Results from last 7 days   Lab Units 09/22/20  1521   SODIUM mmol/L 131*   POTASSIUM mmol/L 4 3   CHLORIDE mmol/L 102   CO2 mmol/L 21*   BUN mg/dL 39*   CREATININE mg/dL 1 18*   ANION GAP mmol/L 8   CALCIUM mg/dL 9 4   ALBUMIN g/dL 3 3*   TOTAL BILIRUBIN mg/dL 0 23*   ALK PHOS U/L 67 4   ALT U/L 15   AST U/L 18   GLUCOSE RANDOM mg/dL 115     Results from last 7 days   Lab Units 09/22/20  1521   INR  0 98             Results from last 7 days   Lab Units 09/22/20  1521   LACTIC ACID mmol/L 1 2       Imaging: I have personally reviewed pertinent reports  No orders to display       EKG, Pathology, and Other Studies Reviewed on Admission:   · EKG: No acute ST-T wave changes  Allscripts / Epic Records Reviewed: Yes     ** Please Note: This note has been constructed using a voice recognition system   **

## 2020-09-22 NOTE — PROGRESS NOTES
Progress Note - Infectious Disease   Demetrius Childs 65 y o  female MRN: [id number]   Encounter: [id number]    Impression:  · Recurrent cellulitis of lower extremities:  Pt was hospitalized in July 2020 and treated for cellulitis of b/l lower extremities  7/27 superficial wound cultures of her legs with growth of 4+ Pseudomonas aeruginosa, 4+ Proteus mirabilis, and 1+ ESBL Klebsiella pneumoniae  7/27 MRSA nares screen is negative  Upon hospital discharge, pt was given continued therapy with cefepime IV x 10 day course total  Pt appears to be developing recurrent cellulitis without purulent drainage and has now failed treatment course of cefpodoxime p o  She is currently afebrile and hemodynamically stable  · Recent hx of C diff colitis: Continued diarrhea despite metronidazole po  · Lymphedema: chronic  · Hypotension:  BP is currently stable  Pt is on midodrine  · History of multiple antibiotic allergies including vancomycin IV, ciprofloxacin, cephalosporin, sulfa: patient reports that her reaction to most of these medications is rash/itching  She has tolerated cefepime during her recent hospitalization last month  · History of depression, anxiety:  On bupropion, buspirone, escitalopram      RECOMMENDATIONS:   · Suggest hospital admission given failure of outpatient therapy  Pt prefers to go to [facility]   Patient will likely need to be transitioned to skilled nursing facility thereafter  · Patient was not able to go to the [facility] for referral  · Upon arrival to the ER, please check CBC, creatinine, lactic acid level, wound culture from leg wounds  · Suggest inpt wound care referral  · Start meropenem 500 mg IV q 6 hours  · Discontinue metronidazole  · Start vancomycin 125 mg p o  Q 12 hours for C diff prophylaxis  · Local wound care of legs with Xeroform, ABD pads, Kerlix, Safe-Clens and change dressings twice daily if possible  · B/L lower extremity elevation advised    My recommendations were discussed with the patient in detail who verbalized understanding  Her questions were answered by me  I spoke with [facility] staff physician and gave verbal signout  Antibiotics:     Current Outpatient Medications:     buPROPion (ZYBAN) 150 MG 12 hr tablet, Take 2 tablets (300 mg total) by mouth daily, Disp: 60 tablet, Rfl: 0    busPIRone (BUSPAR) 15 mg tablet, Take 1 tablet (15 mg total) by mouth 2 (two) times a day, Disp: 60 tablet, Rfl: 0    cholecalciferol (VITAMIN D3) 1,000 units tablet, Take 1,000 Units by mouth daily , Disp: , Rfl:     Cyanocobalamin (B-12 COMPLIANCE INJECTION IJ), Inject as directed, Disp: , Rfl:     cyclobenzaprine (FLEXERIL) 5 mg tablet, Take 1 tablet (5 mg total) by mouth 2 (two) times a day as needed for muscle spasms, Disp: 10 tablet, Rfl: 0    escitalopram (LEXAPRO) 10 mg tablet, Take 1 tablet (10 mg total) by mouth daily, Disp: 30 tablet, Rfl: 0    ferrous sulfate 325 (65 Fe) mg tablet, Take 2 tablets (650 mg total) by mouth daily with breakfast, Disp: 60 tablet, Rfl: 0    LORazepam (ATIVAN) 0 5 mg tablet, TAKE 1 TABLET BY MOUTH EVERY 8 HOURS AS NEEDED FOR ANXIETY (1 PRIOR TO EXAM), Disp: , Rfl:     midodrine (PROAMATINE) 10 MG tablet, Take 1 tablet PO TID, hold for SBP >/= 130, Disp: 90 tablet, Rfl: 0    multivitamin (THERAGRAN) TABS, Take 1 tablet by mouth daily, Disp: , Rfl:     Probiotic Product (PROBIOTIC PO), Take 2 capsules by mouth daily, Disp: , Rfl:     lidocaine (LIDODERM) 5 %, Apply 1 patch topically daily Remove & Discard patch within 12 hours or as directed by MD (Patient not taking: Reported on 9/22/2020), Disp: 5 patch, Rfl: 2    metroNIDAZOLE (FLAGYL) 500 mg tablet, TAKE 1 TABLET BY MOUTH THREE TIMES A DAY FOR 10 DAYS, Disp: , Rfl:     Subjective:  66 yo woman seen today for office follow-up of recurrent cellulitis  She was discharged from the [address] [place] [address] in July and [place]ed [place] [facility] [place] ABx [place] and rehab   While at [facility], she developed diarrhea and was diagnosed with C diff colitis  She reports diarrhea after meals, but her stools are beginning to form  She continues to have 5-6 BMs daily  She reports ongoing leg swelling, redness and weepy drainage  She reports shortness of breath with exertion  She is able to walk short distances with a walker  She has chronic LT hip and knee pain that limits ROM of her left leg  She denies fever, chills, sweats; no nausea, vomiting  Objective:  Vitals:  Vitals:    09/22/20 1048   BP: 100/76   Pulse: 89   Temp: (!) 96 5 °F (35 8 °C)   Height: 4' 11" (1 499 m)     Physical Exam:   General Appearance:  Alert, interactive, nontoxic, no acute distress  Throat: Deferred as pt is wearing face mask   Lungs:   Clear to auscultation bilaterally; no wheezes, respirations unlabored   Heart:  S1, S2, 3/6 systolic murmur, RRR; no murmur   Abdomen:   Soft, non-tender, non-distended   Extremities: B/l lower extremity edema, erythema with serous drainage that is soaking through her dressing, warmth with weepy drainage noted on ABD pads  Extremities are not hot or tender to palpation   Neurologic: AAO x3, conversant, fluent speech   Pt is in wheelchair   Skin: No rash     Labs, Imaging, & Other studies: reviewed

## 2020-09-22 NOTE — ED PROVIDER NOTES
History  Chief Complaint   Patient presents with    Leg Swelling     Referred by provider for leg swelling     This is a 78-year-old female who presents emergency department with cellulitis of bilateral legs  She has been on antibiotics as an outpatient  She was seen by infectious disease and sent to the emergency department for further evaluation due to worsening infection despite outpatient oral antibiotics  Patient has not had any fevers or chills  Symptoms are moderate in severity and have been worsening over the past several days  Pain in both legs  She does have dressings on both legs  No radiation of symptoms  No aggravating or alleviating factors  No other associated symptoms  Differential diagnosis includes cellulitis          Prior to Admission Medications   Prescriptions Last Dose Informant Patient Reported? Taking?    Cyanocobalamin (B-12 COMPLIANCE INJECTION IJ) Past Week at Unknown time  Yes Yes   Sig: Inject as directed   LORazepam (ATIVAN) 0 5 mg tablet 9/22/2020 at Unknown time  Yes Yes   Sig: TAKE 1 TABLET BY MOUTH EVERY 8 HOURS AS NEEDED FOR ANXIETY (1 PRIOR TO EXAM)   Probiotic Product (PROBIOTIC PO) 9/21/2020 at Unknown time Self Yes Yes   Sig: Take 2 capsules by mouth daily   buPROPion (ZYBAN) 150 MG 12 hr tablet 9/22/2020 at Unknown time  No Yes   Sig: Take 2 tablets (300 mg total) by mouth daily   cholecalciferol (VITAMIN D3) 1,000 units tablet 9/22/2020 at Unknown time Self Yes Yes   Sig: Take 1,000 Units by mouth daily    cyclobenzaprine (FLEXERIL) 5 mg tablet More than a month at Unknown time  No No   Sig: Take 1 tablet (5 mg total) by mouth 2 (two) times a day as needed for muscle spasms   lidocaine (LIDODERM) 5 %   No No   Sig: Apply 1 patch topically daily Remove & Discard patch within 12 hours or as directed by MD   Patient not taking: Reported on 9/22/2020   metroNIDAZOLE (FLAGYL) 500 mg tablet More than a month at Unknown time  Yes No   Sig: TAKE 1 TABLET BY MOUTH THREE TIMES A DAY FOR 10 DAYS   multivitamin (THERAGRAN) TABS Past Month at Unknown time  Yes Yes   Sig: Take 1 tablet by mouth daily      Facility-Administered Medications: None       Past Medical History:   Diagnosis Date    Anemia     Anxiety     Chronic back pain     Hyperkalemia     Hypertension     Hypotension     Lymphedema     BRANDYN (obstructive sleep apnea)     Psychiatric disorder     depression       Past Surgical History:   Procedure Laterality Date    APPENDECTOMY      GASTRIC BYPASS      obesity        Family History   Family history unknown: Yes     I have reviewed and agree with the history as documented  E-Cigarette/Vaping    E-Cigarette Use Never User      E-Cigarette/Vaping Substances    Nicotine No     THC No     CBD No     Flavoring No     Other No     Unknown No      Social History     Tobacco Use    Smoking status: Never Smoker    Smokeless tobacco: Never Used    Tobacco comment: Never smoker - As per [place]    Substance Use Topics    Alcohol use: Not Currently     Comment: Alcohol intake:   None - As per Leanne Mario Drug use: Never     Comment: Illicit drugs:   no - As per [place]        Review of Systems   Constitutional: Negative for activity change, appetite change and fever  HENT: Negative for congestion, ear pain, rhinorrhea and sore throat  Eyes: Negative for pain and redness  Respiratory: Negative for cough, shortness of breath and wheezing  Cardiovascular: Negative for chest pain and palpitations  Gastrointestinal: Negative for abdominal pain, diarrhea, nausea and vomiting  Endocrine: Negative for polyuria  Genitourinary: Negative for difficulty urinating, dysuria, frequency and urgency  Musculoskeletal: Negative for arthralgias and myalgias  Skin: Positive for color change and wound  Negative for rash  Allergic/Immunologic: Negative for immunocompromised state  Neurological: Negative for dizziness, syncope and light-headedness     Hematological: Does not bruise/bleed easily  Psychiatric/Behavioral: Negative for confusion  All other systems reviewed and are negative  Physical Exam  Physical Exam  Vitals signs and nursing note reviewed  Constitutional:       General: She is not in acute distress  Appearance: She is well-developed  HENT:      Head: Normocephalic and atraumatic  Nose: Nose normal    Eyes:      General: No scleral icterus  Conjunctiva/sclera: Conjunctivae normal    Neck:      Musculoskeletal: Normal range of motion and neck supple  Cardiovascular:      Rate and Rhythm: Normal rate and regular rhythm  Heart sounds: Normal heart sounds  Pulmonary:      Effort: Pulmonary effort is normal  No respiratory distress  Breath sounds: Normal breath sounds  No stridor  No wheezing  Abdominal:      General: There is no distension  Palpations: Abdomen is soft  Tenderness: There is no abdominal tenderness  There is no guarding or rebound  Musculoskeletal:         General: Swelling present  No deformity  Comments: Bilateral lower extremity edema  Skin:     General: Skin is warm and dry  Findings: Erythema present  No rash  Comments: Bilateral lower extremities redness with weeping and open wounds  Neurological:      Mental Status: She is alert and oriented to person, place, and time  Psychiatric:         Thought Content:  Thought content normal                              Vital Signs  ED Triage Vitals   Temperature Pulse Respirations Blood Pressure SpO2   09/22/20 1459 09/22/20 1459 09/22/20 1459 09/22/20 1459 09/22/20 1459   (!) 96 8 °F (36 °C) 86 20 (!) 95/49 100 %      Temp Source Heart Rate Source Patient Position - Orthostatic VS BP Location FiO2 (%)   09/22/20 1459 09/22/20 1459 09/22/20 1459 09/22/20 1459 --   Tympanic Monitor Lying Left arm       Pain Score       09/22/20 1639       7           Vitals:    09/24/20 1326 09/24/20 1500 09/25/20 0619 09/25/20 0725   BP: (!) 88/54 111/64 92/57 92/56   Pulse: 92 72 96 96   Patient Position - Orthostatic VS: Lying Lying Lying          Visual Acuity      ED Medications  Medications   sodium chloride 0 9 % bolus 250 mL (250 mL Intravenous New Bag 9/24/20 1432)       Diagnostic Studies  Results Reviewed     Procedure Component Value Units Date/Time    Blood culture #1 [[id number]] Collected:  09/22/20 1521    Lab Status:  Final result Specimen:  Blood from Hand, Left Updated:  09/28/20 0001     Blood Culture No Growth After 5 Days  Blood culture #2 [[id number]] Collected:  09/22/20 1521    Lab Status:  Final result Specimen:  Blood from Arm, Right Updated:  09/28/20 0001     Blood Culture No Growth After 5 Days      Wound culture and Gram stain [[id number]]  (Abnormal)  (Susceptibility) Collected:  09/22/20 1551    Lab Status:  Final result Specimen:  Wound from Leg, Right Updated:  09/25/20 0904     Wound Culture 3+ Growth of Pseudomonas aeruginosa      3+ Growth of Proteus mirabilis      3+ Growth of      Gram Stain Result No polys seen      1+ Gram negative rods      Rare Gram positive rods    Susceptibility     Pseudomonas aeruginosa (1)     Antibiotic Interpretation Method Status    Aztreonam ($$$)  Susceptible BRENDA Final    Cefepime ($) Susceptible BRENDA Final    Ceftazidime ($$) Susceptible BRENDA Final    Ciprofloxacin ($)  Susceptible BRENDA Final    Gentamicin ($$) Susceptible BRENDA Final    Imipenem Susceptible BRENDA Final    Levofloxacin ($) Susceptible BRENDA Final    Meropenem ($$) Susceptible BRENDA Final    Piperacillin + Tazobactam ($$$) Susceptible BRENDA Final    Tobramycin ($) Susceptible BRENDA Final          Proteus mirabilis (2)     Antibiotic Interpretation Method Status    Ampicillin ($$) Susceptible BRENDA Final    Aztreonam ($$$)  Susceptible BRENDA Final    Cefazolin ($) Susceptible BRENDA Final    Ciprofloxacin ($)  Susceptible BRENDA Final    Gentamicin ($$) Susceptible BRENDA Final    Levofloxacin ($) Susceptible BRENDA Final    Tetracycline Resistant BRENDA Final    Tobramycin ($) Susceptible BRENDA Final    Trimethoprim + Sulfamethoxazole ($$$) Susceptible BRENDA Final                   MRSA culture [[id number]]  (Normal) Collected:  09/22/20 1551    Lab Status:  Final result Specimen:  Nares from Nose Updated:  09/24/20 1143     MRSA Culture Only No Methicillin Resistant Staphlyococcus aureus (MRSA) isolated    Procalcitonin with AM Reflex [[id number]]  (Normal) Collected:  09/22/20 1521    Lab Status:  Final result Specimen:  Blood from Arm, Right Updated:  09/23/20 0039     Procalcitonin 0 11 ng/ml     Protime-INR [[id number]]  (Normal) Collected:  09/22/20 1521    Lab Status:  Final result Specimen:  Blood from Arm, Right Updated:  09/22/20 1604     Protime 10 4 seconds      INR 0 98    Narrative:       INR Reference Ranges:  No Anticoagulant, Normal:           0 9-1 1  Standard Dose, Oral Anticoagulant:  2 0-3 0  High Dose, Oral Anticoagulant:      2 5-3 5    APTT [[id number]]  (Abnormal) Collected:  09/22/20 1521    Lab Status:  Final result Specimen:  Blood from Arm, Right Updated:  09/22/20 1604     PTT 33 seconds     Comprehensive metabolic panel [[id number]]  (Abnormal) Collected:  09/22/20 1521    Lab Status:  Final result Specimen:  Blood from Arm, Right Updated:  09/22/20 1548     Sodium 131 mmol/L      Potassium 4 3 mmol/L      Chloride 102 mmol/L      CO2 21 mmol/L      ANION GAP 8 mmol/L      BUN 39 mg/dL      Creatinine 1 18 mg/dL      Glucose 115 mg/dL      Calcium 9 4 mg/dL      Corrected Calcium 10 0 mg/dL      AST 18 U/L      ALT 15 U/L      Alkaline Phosphatase 67 4 U/L      Total Protein 7 6 g/dL      Albumin 3 3 g/dL      Total Bilirubin 0 23 mg/dL      eGFR 46 ml/min/1 73sq m     Narrative:       Angel guidelines for Chronic Kidney Disease (CKD):     Stage 1 with normal or high GFR (GFR > 90 mL/min/1 73 square meters)    Stage 2 Mild CKD (GFR = 60-89 mL/min/1 73 square meters)    Stage 3A Moderate CKD (GFR = 45-59 mL/min/1 73 square meters)    Stage 3B Moderate CKD (GFR = 30-44 mL/min/1 73 square meters)    Stage 4 Severe CKD (GFR = 15-29 mL/min/1 73 square meters)    Stage 5 End Stage CKD (GFR <15 mL/min/1 73 square meters)  Note: GFR calculation is accurate only with a steady state creatinine    Lactic acid [[id number]]  (Normal) Collected:  09/22/20 1521    Lab Status:  Final result Specimen:  Blood from Arm, Right Updated:  09/22/20 1547     LACTIC ACID 1 2 mmol/L     Narrative:       Result may be elevated if tourniquet was used during collection  CBC and differential [[id number]]  (Abnormal) Collected:  09/22/20 1521    Lab Status:  Final result Specimen:  Blood from Arm, Right Updated:  09/22/20 1534     WBC 10 96 Thousand/uL      RBC 4 18 Million/uL      Hemoglobin 11 8 g/dL      Hematocrit 36 6 %      MCV 88 fL      MCH 28 2 pg      MCHC 32 2 g/dL      RDW 16 1 %      MPV 10 0 fL      Platelets 233 Thousands/uL      Neutrophils Relative 75 %      Immat GRANS % 0 %      Lymphocytes Relative 14 %      Monocytes Relative 8 %      Eosinophils Relative 2 %      Basophils Relative 1 %      Neutrophils Absolute 8 20 Thousands/µL      Immature Grans Absolute 0 04 Thousand/uL      Lymphocytes Absolute 1 56 Thousands/µL      Monocytes Absolute 0 88 Thousand/µL      Eosinophils Absolute 0 23 Thousand/µL      Basophils Absolute 0 05 Thousands/µL                  No orders to display              Procedures  Procedures         ED Course                       Initial Sepsis Screening     Row Name 09/22/20 4531                Is the patient's history suggestive of a new or worsening infection?  --        Suspected source of infection  soft tissue  -RP        Are two or more of the following signs & symptoms of infection both present and new to the patient?   No  -RP        Indicate SIRS criteria  --        If the answer is yes to both questions, suspicion of sepsis is present  --        If severe sepsis is present AND tissue hypoperfusion perists in the hour after fluid resuscitation or lactate > 4, the patient meets criteria for SEPTIC SHOCK  --        Are any of the following organ dysfunction criteria present within 6 hours of suspected infection and SIRS criteria that are NOT considered to be chronic conditions? No  -RP        Organ dysfunction  --        Date of presentation of severe sepsis  --        Time of presentation of severe sepsis  --        Tissue hypoperfusion persists in the hour after crystalloid fluid administration, evidenced, by either:  --        Was hypotension present within one hour of the conclusion of crystalloid fluid administration? No  -RP        Date of presentation of septic shock  --        Time of presentation of septic shock  --          User Key  (r) = Recorded By, (t) = Taken By, (c) = Cosigned By    [address] Name Provider Type    RP Darryl Brunner, MD Physician                      MDM  Number of Diagnoses or Management Options  Cellulitis of right leg:   Left leg cellulitis:   Diagnosis management comments: Patient with bilateral cellulitis with failed outpatient therapy         Amount and/or Complexity of Data Reviewed  Clinical lab tests: ordered and reviewed  Discuss the patient with other providers: yes        Disposition  Final diagnoses:   Left leg cellulitis - failed outpatient therapy   Cellulitis of right leg - failed outpatient therapy     Time reflects when diagnosis was documented in both MDM as applicable and the Disposition within this note     Time User Action Codes Description Comment    9/22/2020  3:40 PM Enoch Riding Add [L03 [id number]] Left leg cellulitis     9/22/2020  3:41 PM [address] [place] [address] [[id number]] Left leg cellulitis failed outpatient therapy    9/22/2020  3:41 PM Enoch Riding Add [L03 [id number]] Cellulitis of right leg     9/22/2020  3:41 PM Enoch Riding Modify [L03 [id number]] Cellulitis of right leg failed outpatient therapy    9/23/2020 10:32 AM Alma Yu Ayala Clarke [[id number]] Recurrent cellulitis of lower extremity     9/25/2020  2:58 PM Danielle Roger Nicole [F32 [id number]] Depression, unspecified depression type       ED Disposition     ED Disposition Condition Date/Time Comment    Admit Stable Tue Sep 22, 2020  3:40 PM Case was discussed with Dr Shkiha Calderón and the patient's admission status was agreed to be Admission Status: inpatient status to the service of Dr Shikha Calderón MD Documentation      Most Recent Value   Accepting [facility], [facility] and Unit [facility])  [facility]      RN Documentation      [facility] [address] Name, [facility] [employer] and Unit #)  EMS   Level of Care  Basic life support   Transfer Date  09/25/20   Transfer Time  1700      Follow-up Information     Follow up With Specialties Details Why [address] Adán Estrada, DO Infectious Diseases Follow up in 3 day(s)  2061 [address] [facility] [address]  [phone]            Discharge Medication List as of 9/25/2020  4:13 PM      START taking these medications    Details   acetaminophen (TYLENOL) 325 mg tablet Take 2 tablets (650 mg total) by mouth every 6 (six) hours as needed for mild pain or moderate pain, Starting Fri 9/25/2020, Normal      vancomycin (VANCOCIN) 125 MG capsule Take 1 capsule (125 mg total) by mouth 2 (two) times a day for 9 days, Starting Fri 9/25/2020, Until Sun 10/4/2020, Normal      buPROPion (WELLBUTRIN XL) 300 mg 24 hr tablet Take 1 tablet (300 mg total) by mouth daily, Starting Sat 9/26/2020, Normal      cefepime (MAXIPIME) 1000 mg IVPB Infuse 50 mL (1,000 mg total) into a venous catheter every 12 (twelve) hours for 12 doses, Starting Fri 9/25/2020, Until Thu 10/1/2020, Normal         CONTINUE these medications which have NOT CHANGED    Details   cholecalciferol (VITAMIN D3) 1,000 units tablet Take 1,000 Units by mouth daily , Historical Med Cyanocobalamin (B-12 COMPLIANCE INJECTION IJ) Inject as directed, Historical Med      multivitamin (THERAGRAN) TABS Take 1 tablet by mouth daily, Historical Med      Probiotic Product (PROBIOTIC PO) Take 2 capsules by mouth daily, Historical Med      busPIRone (BUSPAR) 15 mg tablet Take 1 tablet (15 mg total) by mouth 2 (two) times a day, Starting Tue 8/18/2020, Normal      escitalopram (LEXAPRO) 10 mg tablet Take 1 tablet (10 mg total) by mouth daily, Starting Tue 8/18/2020, Normal      ferrous sulfate 325 (65 Fe) mg tablet Take 2 tablets (650 mg total) by mouth daily with breakfast, Starting Tue 8/18/2020, Normal      midodrine (PROAMATINE) 10 MG tablet Take 1 tablet PO TID, hold for SBP >/= 130, Normal      cyclobenzaprine (FLEXERIL) 5 mg tablet Take 1 tablet (5 mg total) by mouth 2 (two) times a day as needed for muscle spasms, Starting Tue 8/18/2020, Normal      lidocaine (LIDODERM) 5 % Apply 1 patch topically daily Remove & Discard patch within 12 hours or as directed by MD, Starting Fri 7/31/2020, Normal         STOP taking these medications       buPROPion (ZYBAN) 150 MG 12 hr tablet Comments:   Reason for Stopping:         LORazepam (ATIVAN) 0 5 mg tablet Comments:   Reason for Stopping:         metroNIDAZOLE (FLAGYL) 500 mg tablet Comments:   Reason for Stopping:                 PDMP Review     None          ED Provider  Electronically Signed by           Sarbjit Hawk MD  10/09/20 4947

## 2020-09-22 NOTE — ASSESSMENT & PLAN NOTE
Patient has a history of recurrent cellulitis of the bilateral lower extremities since 2003  Previous cultures Pseudomonas secondly sensitive, ESBL Klebsiella and Proteus  Patient received discharge for this issue in July 2020, with continual ten-day course of IV cefepime for 10 days  Patient failed cefpodoxime  Assessment of 1 today indicates greatest severity done on previous admission (please reference images in media)  - Infectious disease consult appreciated  -History of Multi-drug resistant organism from prior wound cultures: Will provide coverage with meropenem  - provide wound care  - Follow-up current wound cultures

## 2020-09-22 NOTE — SEPSIS NOTE
Sepsis Note   Eddie Garcia 65 y o  female MRN: [id number]  Unit/Bed#: -01 Encounter: [id number]      qSOFA     Row Name 09/22/20 1459                Altered mental status GCS < 15  --        Respiratory Rate > / =70  0        Systolic BP < / =844  1        Q Sofa Score  1            Initial Sepsis Screening     Row Name 09/22/20 1621                Is the patient's history suggestive of a new or worsening infection?  --        Suspected source of infection  soft tissue  -RP        Are two or more of the following signs & symptoms of infection both present and new to the patient? No  -RP        Indicate SIRS criteria  --        If the answer is yes to both questions, suspicion of sepsis is present  --        If severe sepsis is present AND tissue hypoperfusion perists in the hour after fluid resuscitation or lactate > 4, the patient meets criteria for SEPTIC SHOCK  --        Are any of the following organ dysfunction criteria present within 6 hours of suspected infection and SIRS criteria that are NOT considered to be chronic conditions? No  -RP        Organ dysfunction  --        Date of presentation of severe sepsis  --        Time of presentation of severe sepsis  --        Tissue hypoperfusion persists in the hour after crystalloid fluid administration, evidenced, by either:  --        Was hypotension present within one hour of the conclusion of crystalloid fluid administration?   No  -RP        Date of presentation of septic shock  --        Time of presentation of septic shock  --          User Key  (r) = Recorded By, (t) = Taken By, (c) = Cosigned By    [address] Name Provider Type    RP Bethany Shi MD Physician

## 2020-09-22 NOTE — PLAN OF CARE
Problem: Potential for Falls  Goal: Patient will remain free of falls  Description: INTERVENTIONS:  - Assess patient frequently for physical needs  -  Identify cognitive and physical deficits and behaviors that affect risk of falls    -  [place] fall precautions as indicated by assessment   - Educate patient/family on patient safety including physical limitations  - Instruct patient to call for assistance with activity based on assessment  - Modify environment to reduce risk of injury  - Consider OT/PT consult to assist with strengthening/mobility  Outcome: Progressing     Problem: Prexisting or High Potential for Compromised Skin Integrity  Goal: Skin integrity is maintained or improved  Description: INTERVENTIONS:  - Identify patients at risk for skin breakdown  - Assess and monitor skin integrity  - Assess and monitor nutrition and hydration status  - Monitor labs   - Assess for incontinence   - Turn and reposition patient  - Assist with mobility/ambulation  - Relieve pressure over bony prominences  - Avoid friction and shearing  - Provide appropriate hygiene as needed including keeping skin clean and dry  - Evaluate need for skin moisturizer/barrier cream  - Collaborate with interdisciplinary team   - Patient/family teaching  - Consider wound care consult   Outcome: Progressing     Problem: INFECTION - ADULT  Goal: Absence or prevention of progression during hospitalization  Description: INTERVENTIONS:  - Assess and monitor for signs and symptoms of infection  - Monitor lab/diagnostic results  - Monitor all insertion sites, i e  indwelling lines, tubes, and drains  - Monitor endotracheal if appropriate and nasal secretions for changes in amount and color  - [place] appropriate cooling/warming therapies per order  - Administer medications as ordered  - Instruct and encourage patient and family to use good hand hygiene technique  - Identify and instruct in appropriate isolation precautions for identified infection/condition  Outcome: Progressing

## 2020-09-23 LAB
ANION GAP SERPL CALCULATED.3IONS-SCNC: 5 MMOL/L (ref 4–13)
BUN SERPL-MCNC: 30 MG/DL (ref 6–20)
CALCIUM SERPL-MCNC: 8.6 MG/DL (ref 8.4–10.2)
CHLORIDE SERPL-SCNC: 108 MMOL/L (ref 96–108)
CO2 SERPL-SCNC: 22 MMOL/L (ref 22–33)
CREAT SERPL-MCNC: 0.87 MG/DL (ref 0.4–1.1)
GFR SERPL CREATININE-BSD FRML MDRD: 67 ML/MIN/1.73SQ M
GLUCOSE SERPL-MCNC: 84 MG/DL (ref 65–140)
POTASSIUM SERPL-SCNC: 4 MMOL/L (ref 3.5–5)
PROCALCITONIN SERPL-MCNC: 0.11 NG/ML
PROCALCITONIN SERPL-MCNC: 0.11 NG/ML
SODIUM SERPL-SCNC: 135 MMOL/L (ref 133–145)

## 2020-09-23 PROCEDURE — 97530 THERAPEUTIC ACTIVITIES: CPT

## 2020-09-23 PROCEDURE — 80048 BASIC METABOLIC PNL TOTAL CA: CPT | Performed by: INTERNAL MEDICINE

## 2020-09-23 PROCEDURE — 84145 PROCALCITONIN (PCT): CPT | Performed by: EMERGENCY MEDICINE

## 2020-09-23 PROCEDURE — 99232 SBSQ HOSP IP/OBS MODERATE 35: CPT | Performed by: INTERNAL MEDICINE

## 2020-09-23 PROCEDURE — 97163 PT EVAL HIGH COMPLEX 45 MIN: CPT

## 2020-09-23 RX ORDER — SACCHAROMYCES BOULARDII 250 MG
250 CAPSULE ORAL 2 TIMES DAILY
Status: DISCONTINUED | OUTPATIENT
Start: 2020-09-23 | End: 2020-09-25 | Stop reason: HOSPADM

## 2020-09-23 RX ORDER — SACCHAROMYCES BOULARDII 250 MG
500 CAPSULE ORAL DAILY
Status: DISCONTINUED | OUTPATIENT
Start: 2020-09-24 | End: 2020-09-23

## 2020-09-23 RX ADMIN — OXYCODONE HYDROCHLORIDE 5 MG: 5 TABLET ORAL at 14:51

## 2020-09-23 RX ADMIN — VITAMIN D, TAB 1000IU (100/BT) 1000 UNITS: 25 TAB at 09:18

## 2020-09-23 RX ADMIN — MIDODRINE HYDROCHLORIDE 10 MG: 5 TABLET ORAL at 08:00

## 2020-09-23 RX ADMIN — MIDODRINE HYDROCHLORIDE 10 MG: 5 TABLET ORAL at 13:03

## 2020-09-23 RX ADMIN — BUSPIRONE HYDROCHLORIDE 15 MG: 5 TABLET ORAL at 08:00

## 2020-09-23 RX ADMIN — LORAZEPAM 0.5 MG: 0.5 TABLET ORAL at 21:12

## 2020-09-23 RX ADMIN — ESCITALOPRAM OXALATE 10 MG: 10 TABLET ORAL at 09:18

## 2020-09-23 RX ADMIN — MEROPENEM 1000 MG: 1 INJECTION, POWDER, FOR SOLUTION INTRAVENOUS at 07:07

## 2020-09-23 RX ADMIN — Medication 125 MG: at 21:12

## 2020-09-23 RX ADMIN — ENOXAPARIN SODIUM 40 MG: 100 INJECTION SUBCUTANEOUS at 09:17

## 2020-09-23 RX ADMIN — MIDODRINE HYDROCHLORIDE 10 MG: 5 TABLET ORAL at 17:23

## 2020-09-23 RX ADMIN — OXYCODONE HYDROCHLORIDE 5 MG: 5 TABLET ORAL at 09:17

## 2020-09-23 RX ADMIN — Medication 125 MG: at 08:00

## 2020-09-23 RX ADMIN — BUSPIRONE HYDROCHLORIDE 15 MG: 5 TABLET ORAL at 17:23

## 2020-09-23 RX ADMIN — FERROUS SULFATE TAB 325 MG (65 MG ELEMENTAL FE) 325 MG: 325 (65 FE) TAB at 14:50

## 2020-09-23 RX ADMIN — Medication 250 MG: at 14:50

## 2020-09-23 RX ADMIN — MEROPENEM 1000 MG: 1 INJECTION, POWDER, FOR SOLUTION INTRAVENOUS at 17:24

## 2020-09-23 RX ADMIN — BUPROPION 300 MG: 150 TABLET, EXTENDED RELEASE ORAL at 09:18

## 2020-09-23 RX ADMIN — Medication 250 MG: at 17:16

## 2020-09-23 NOTE — ASSESSMENT & PLAN NOTE
Wound appears less erythematous, with debridement on today's assessment  - Infectious disease consult appreciated  - continue meropenem IV  - continue vancomycin p o  For C diff prophylaxis  - History of MDRO wound cultures  Will stain showed no PMNs, +1 gram negative rods and rare Gram-positive rods  Follow-up wound culture  - Provide wound care   Xeroform, ABD pads, Kerlix, Safe-Clens and change dressings twice daily if possible

## 2020-09-23 NOTE — PHYSICAL THERAPY NOTE
PHYSICAL THERAPY EVALUATION  Time: 6000-9929    NAME:  Demetrius Childs  DATE: 09/23/20    AGE:   65 y o  Mrn:   [id number]  Length Of Stay: 1    ADMIT DX:  Leg pain [M79 606]  Leg swelling [M79 89]  Cellulitis of right leg [M85 138]  Left leg cellulitis [D53 144]    Past Medical History:   Diagnosis Date    Anemia     Anxiety     Chronic back pain     Hyperkalemia     Hypertension     Hypotension     Lymphedema     BRANDYN (obstructive sleep apnea)     Psychiatric disorder     depression     Past Surgical History:   Procedure Laterality Date    APPENDECTOMY      GASTRIC BYPASS      obesity        Performed at least 2 patient identifiers during session: Name, Radha Ilda and ID bracelet         09/23/20 1401   PT Last Visit   PT Visit Date 09/23/20   Note Type   Note type Eval/Treat   Pain Assessment   Pain Assessment Tool 0-10   Pain Score 5   Pain Location/Orientation Orientation: Bilateral;Location: Knee   Pain Onset/Description Onset: Ongoing;Frequency: Constant/Continuous; Descriptor: Discomfort; Descriptor: Sore   Effect of Pain on Daily Activities limits independence with and tolerance of functional mobility    Patient's Stated Pain Goal No pain   Hospital Pain Intervention(s) Ambulation/increased activity;Repositioned   Home Living   Type of [address] [place] [address] One level;Ramped entrance   Bathroom Shower/Tub Walk-in shower   Bathroom Toilet Standard   Bathroom Equipment Grab bars in [facility] 124; Wheelchair-manual;Other (Comment)  (recliner lift chair )   Prior Function   Level of Ray Needs assistance with ADLs and functional mobility; Needs assistance with IADLs   Lives With Alone   Receives Help From Family;Friend(s); Home health  (HHPT 2x/wk )   ADL Assistance Needs assistance   IADLs Needs assistance   Falls in the last 6 months 0  (pt denies )   Vocational Retired   Comments Pt reports living at home alone in a [facility] with ramp entrance   Pt recently with multiple SNF STR stays  Pt requires assist with all ADLs and IADLs, only ambulates with home PT (2x/wk) and otherwise utilizes WC  Pt reports sleeping in upright chair w/o LEs elevated  Pt also has wound care RN and VNA  Her sister comes by daily to assist with household tasks prn  Restrictions/Precautions   Weight Bearing Precautions Per Order No   Other Precautions Contact/isolation; Chair Alarm; Bed Alarm; Fall Risk;Pain   General   Family/Caregiver Present No   Cognition   Overall Cognitive Status WFL   Arousal/Participation Alert   Orientation Level Oriented X4   Memory Within functional limits   Following Commands Follows multistep commands with increased time or repetition   Comments Pt emotionally labile t/o session, at times very angry with care but other times pt pleasant and cooperative, thanking therapist for assistance  RUE Assessment   RUE Assessment WFL   LUE Assessment   LUE Assessment WFL   RLE Assessment   RLE Assessment X  (Severe redness, edema, and areas of bleeding/weeping)   Strength RLE   RLE Overall Strength 2-/5   LLE Assessment   LLE Assessment X  (Severe redness, edema, and areas of bleeding/weeping)   Strength LLE   LLE Overall Strength 2-/5   Coordination   Movements are Fluid and Coordinated 1   Sensation WFL   Light Touch   RLE Light Touch Grossly intact   LLE Light Touch Grossly intact   Bed Mobility   Supine to Sit 2  Maximal assistance   Additional items Assist x 1;HOB elevated; Bedrails; Increased time required;Verbal cues;LE management   Sit to Supine Unable to assess   Additional Comments Pt demonstrates fair supported and poor unsupported sitting balance at EOB ~3 minutes prior to transfer training  Balance   Static Sitting Fair   Dynamic Sitting Poor   Endurance Deficit   Endurance Deficit Yes   Activity Tolerance   Activity Tolerance Patient limited by pain; Patient limited by fatigue   Nurse Made Aware Yes, spoke with GEO Panchal pre/post session   Assessment   Prognosis Fair   Problem List Decreased strength;Decreased range of motion;Decreased endurance; Impaired balance;Decreased mobility; Impaired judgement;Decreased safety awareness;Decreased skin integrity;Pain   Assessment Pt seen for PT evaluation, after clearance by GEO Panchal, with fall & contact precautions  Pt admitted on 9/22/2020 with c/o recurrent BLE cellulitis, and dx of Recurrent cellulitis of lower extremity  Comorbidities affecting pt's functional performance include: anemia, chronic back pain, depression and hypertension, h/o gastric bypass, lymphedema  Personal factors affecting patient at time of initial evaluation include: ambulating with assistive device, inability to ambulate household distances, inability to navigate community distances, inability to navigate level surfaces without external assistance, inability to perform dynamic tasks in community, limited home support, anxiety, depression, inability to perform ADLS, inability to perform IADLS  and inability to live alone  Prior to admission, patient was requiring assist for functional mobility with use of RW and HHPT, requiring assist for ADLS, requiring assist for IADLS, living alone in a single story home with ramp/no steps to enter and home with family assist and home health services  The Barthel Index was used as a functional outcome tool presenting with a score of 35 indicating severe limitations of functional mobility and ADLS  Pt is at risk of falls due to multiple comorbidities, impaired balance, impaired insight/safety awareness, acuity of medical illness, use of assistive device, varying levels of pain  and unstable emotional status  Pt's clinical presentation is currently evolving as seen in patient's presentation of changing level of pain, varying levels of cognitive performance, increased fall risk, new onset of impairment of functional mobility, decreased endurance and new onset of weakness   This PT evaluation requires clinical decision making of high complexity, based on multiple medical/physiological/functional factors which affect pt's performance with evaluation and therapist judgement for future treatment sessions  Pt would benefit from continued PT treatment in order to address above impairments, decrease risk of falls, maximize independence with functional mobility, and ensure safety with mobility for transition to next level of care  Based on pt presentation and impairments, pt would most appropriately benefit from HHPT and increased supports vs STR pending progress upon d/c     Barriers to Discharge Decreased caregiver support   Goals   Patient Goals "to have one day all to myself and just sleep "    [facility] Expiration Date 10/03/20   Short Term Goal #1 1  Pt will complete all bed mobility in hospital bed with [facility], in order to promote increased OOB functional mobility to improve overall activity tolerance  2  Pt will complete all transfers with RW and S, in order to increase safety with functional mobility  3  Pt will ambulate >50ft with RW and S in order to increase safety with household distance functional mobility  4  Pt will improve B LE strength to >/= 3+/5 MMT throughout, in order to increase safety with functional mobility and decrease risk of falls  5  Pt will improve Barthel Index score to >/= 45/100 in order to increase independence and decrease risk of falls  6  Pt will demonstrate understanding and independence with LE strengthening HEP  8  Pt will improve standing balance by >/= 1/2 grade in order to promote safety and increased independence with mobility  7  Pt will tolerate >3hrs OOB in recliner chair with good endurance evidenced by no significant change in vitals or behaviors  PT Treatment Day 1  (See treatment note below )   Plan   Treatment/Interventions Functional transfer training;LE strengthening/ROM; Therapeutic exercise; Endurance training;Patient/family training;Equipment eval/education; Bed mobility;Gait training;Spoke to nursing;Spoke to case management;OT;Continued evaluation   PT Frequency 5x/wk   Recommendation   PT Discharge Recommendation Other (Comment); Home with skilled therapy  (HHPT & inc supports vs STR pending progress )   Equipment Recommended Walker; Wheelchair  (pt owns  and [facility] )   Additional Comments Pt will benefit from psychology and psychiatry consult due to pt reported severe anxiety and depression  Modified Cherokee Scale   Modified Cherokee Scale 4   Barthel Index   Feeding 10   Bathing 0   Grooming Score 0   Dressing Score 5   Bladder Score 5   Bowels Score 5   Toilet Use Score 5   Transfers (Bed/Chair) Score 5   Mobility (Level Surface) Score 0   Stairs Score 0   Barthel Index Score 35           PHYSICAL THERAPY TREATMENT NOTE:    TIME IN: 1332  TIME OUT: 1400  TOTAL TREATMENT TIME: 28 minutes     S: "I'm just getting so fed up with everything  I even went as far as stopping my medications for a few days at home " pt verbalizes significant anxiety and depression related to her current and ongoing medical conditions  Pt may benefit from psychology/psychiatry consult  Pt c/o continued pain in B Knees and lower legs however is agreeable to participate in session  O: Session with focus on transfer training  Pt completes STS transfer from EOB x2 trials with RW and GALEN from therapist, slow transition to stand as pt very anxious regarding WBing through LEs  Pt then ambulates ~3ft from bed>chair with RW and GALEN - dec step length and dec foot clearance bilaterally, with +instability during turning  Overall pt limited due to fatigue, depression, anxiety, decreased strength, and pain in B LEs  Pt was left seated in bedside recliner chair with all needs in reach, chair alarm engaged, care returned to RN      A: Regarding functional mobility, pt continues to present below her baseline level of functional mobility and would benefit from continued skilled PT services in the acute care setting   Pt generally with self limiting behaviors and dependency on staff regarding functional mobility and ADLs, however verbalizes eagerness to be independent with such tasks  P: Recommend HHPT and increased supports vs STR once medically cleared for d/c from the acute care setting  Will continue skilled PT POC as able and appropriate to maximize functional outcomes       Agueda Garcia, PT, DPT  9/23/2020

## 2020-09-23 NOTE — PLAN OF CARE
Problem: PHYSICAL THERAPY ADULT  Goal: Performs mobility at highest level of function for planned discharge setting  See evaluation for individualized goals  Description: Treatment/Interventions: Functional transfer training, LE strengthening/ROM, Therapeutic exercise, Endurance training, Patient/family training, Equipment eval/education, Bed mobility, Gait training, Spoke to nursing, Spoke to case management, OT, Continued evaluation  Equipment Recommended: Danae Cervantes owns RW and WC )       See flowsheet documentation for full assessment, interventions and recommendations  Outcome: Progressing  Note: Prognosis: Fair  Problem List: Decreased strength, Decreased range of motion, Decreased endurance, Impaired balance, Decreased mobility, Impaired judgement, Decreased safety awareness, Decreased skin integrity, Pain  Assessment: Pt seen for PT evaluation, after clearance by GEO Panchal, with fall & contact precautions  Pt admitted on 9/22/2020 with c/o recurrent BLE cellulitis, and dx of Recurrent cellulitis of lower extremity  Comorbidities affecting pt's functional performance include: anemia, chronic back pain, depression and hypertension, h/o gastric bypass, lymphedema  Personal factors affecting patient at time of initial evaluation include: ambulating with assistive device, inability to ambulate household distances, inability to navigate community distances, inability to navigate level surfaces without external assistance, inability to perform dynamic tasks in community, limited home support, anxiety, depression, inability to perform ADLS, inability to perform IADLS  and inability to live alone  Prior to admission, patient was requiring assist for functional mobility with use of RW and HHPT, requiring assist for ADLS, requiring assist for IADLS, living alone in a single story home with ramp/no steps to enter and home with family assist and home health services   The Barthel Index was used as a functional outcome tool presenting with a score of 35 indicating severe limitations of functional mobility and ADLS  Pt is at risk of falls due to multiple comorbidities, impaired balance, impaired insight/safety awareness, acuity of medical illness, use of assistive device, varying levels of pain  and unstable emotional status  Pt's clinical presentation is currently evolving as seen in patient's presentation of changing level of pain, varying levels of cognitive performance, increased fall risk, new onset of impairment of functional mobility, decreased endurance and new onset of weakness  This PT evaluation requires clinical decision making of high complexity, based on multiple medical/physiological/functional factors which affect pt's performance with evaluation and therapist judgement for future treatment sessions  Pt would benefit from continued PT treatment in order to address above impairments, decrease risk of falls, maximize independence with functional mobility, and ensure safety with mobility for transition to next level of care  Based on pt presentation and impairments, pt would most appropriately benefit from HHPT and increased supports vs STR pending progress upon d/c    Barriers to Discharge: Decreased caregiver support     PT Discharge Recommendation: Other (Comment), Home with skilled therapy(HHPT & inc supports vs STR pending progress )          See flowsheet documentation for full assessment

## 2020-09-23 NOTE — CASE MANAGEMENT
LOS: 1 Gmlos:3    Patient is not a 30 days readmission and is not a bundle     Pt presented to [facility] ED from her residence with pain in her lower extremities due to cellulitis  Pt is alert and oriented  SW contacted pt in her room to complete assessment  Pt's emergency contact is her sister Xochitl Fontenot [phone]  Pt has a prior Dx of anxiety and depression with no substance abuse  Pt lives alone in a house (first floor bedroom with bathroom), that has a ramp entrance for wheel chair with no stairs  Prior to coming here patients ADL's have declined significantly  She uses a wheel chair to ambulate  Pt stated " my health has declined significantly  My sister helps me with most of my household chores and she helps me move/pushes my Wheel chair to commute "     Pt has previous SNF admissions to [facility]  Last admission was 7/29/20  LOS unknown  Pt reports she is out of SNF benefit as she has used all her days  Pt has no home care services  Her sister assists with cellulitis management and ADLs  Pt is unsure how she will get home at time of discharge  Pt preferred pharmacy is [facility] pharmacy on [address] [address]  PCP is Yaron Mishra  CM reviewed d/c planning process including the following: identifying help at home, patient preference for d/c planning needs  CM also encouraged patient to follow up with all recommended appointments after discharge  Patient advised of importance for patient and family to participate in managing patients medical well being  CM department will continue to follow through discharge

## 2020-09-23 NOTE — PROGRESS NOTES
Progress Note - Lashell Profit 1950, 65 y o  female MRN: [id number]    Unit/Bed#: -01 Encounter: [id number]    Primary Care Provider: Yo Rea MD   Date and time admitted to hospital: 9/22/2020  2:59 PM        * Recurrent cellulitis of lower extremity  Assessment & Plan  Wound appears less erythematous, with debridement on today's assessment  - Infectious disease consult appreciated  - continue meropenem IV  - continue vancomycin p o  For C diff prophylaxis  - History of MDRO wound cultures  Will stain showed no PMNs, +1 gram negative rods and rare Gram-positive rods  Follow-up wound culture  - Provide wound care  Xeroform, ABD pads, Kerlix, Safe-Clens and change dressings twice daily if possible      Anxiety  Assessment & Plan  Continue antidepressant and antianxiety medications  Depression  Assessment & Plan  Continue antidepressant and antianxiety medications  Idiopathic hypotension  Assessment & Plan  BP today of 93/54 around time of assessment    - Continue to monitor hemodynamics  - Continue midodrine  Hold for SBP greater than 130 mmHg  VTE Pharmacologic Prophylaxis:   Pharmacologic: Enoxaparin (Lovenox)  Mechanical VTE Prophylaxis in Place: No    Patient Centered Rounds: I have performed bedside rounds with nursing staff today  Discussions with Specialists or Other Care Team Provider:  None  Education and Discussions with Family / Patient:  None  Time Spent for Care: 30 minutes  More than 50% of total time spent on counseling and coordination of care as described above  Current Length of Stay: 1 day(s)    Current Patient Status: Inpatient   Certification Statement: The patient will continue to require additional inpatient hospital stay due to IV antibiotic treatment and wound care for recurrent cellulitis  Discharge Plan / Estimated Discharge Date:  As above /estimated discharge date on or before 09/30/2020        Code Status: Level 1 - Full Code      Subjective:   Patient interviewed at bedside this morning  Patient states that she did not sleep well last night, and indicated that she was not in the greatest of mood  Upon inquiries into the reason behind move, she states that it is secondary to her medical condition and superimposed by not having her antidepressant medications at that time  Patient denied any fevers, chills, chest pain, palpitations, shortness of breath, or pain at the site of cellulitis of the bilateral lower extremities  Objective:     Vitals:   Temp (24hrs), Av °F (36 7 °C), Min:96 8 °F (36 °C), Max:99 6 °F (37 6 °C)    Temp:  [96 8 °F (36 °C)-99 6 °F (37 6 °C)] 99 2 °F (37 3 °C)  HR:  [82-96] 95  Resp:  [16-20] 20  BP: ()/(49-68) 94/53  SpO2:  [95 %-100 %] 95 %  Body mass index is 28 58 kg/m²  Input and Output Summary (last 24 hours):     No intake or output data in the 24 hours ending 20 1310    Physical Exam:     Physical Exam  Vitals signs and nursing note reviewed  Constitutional:       Appearance: Normal appearance  HENT:      Head: Normocephalic and atraumatic  Mouth/Throat:      Mouth: Mucous membranes are moist    Eyes:      Extraocular Movements: Extraocular movements intact  Conjunctiva/sclera: Conjunctivae normal    Cardiovascular:      Rate and Rhythm: Normal rate and regular rhythm  Heart sounds: S1 normal and S2 normal    Pulmonary:      Effort: Pulmonary effort is normal       Breath sounds: Normal breath sounds  Abdominal:      Palpations: Abdomen is soft  Musculoskeletal:      Comments: Bilateral lower extremity cellulitis extended from digits to approximately 3 in below the knees  Less erythematous and less sloughed epidermis than at previous assessment  Skin:     General: Skin is warm and dry  Neurological:      Mental Status: She is alert             Additional Data:     Labs:    Results from last 7 days   Lab Units 20  1521   WBC Thousand/uL 10 96* HEMOGLOBIN g/dL 11 8   HEMATOCRIT % 36 6   PLATELETS Thousands/uL 346   NEUTROS PCT % 75   LYMPHS PCT % 14   MONOS PCT % 8   EOS PCT % 2     Results from last 7 days   Lab Units 09/23/20  0707 09/22/20  1521   POTASSIUM mmol/L 4 0 4 3   CHLORIDE mmol/L 108 102   CO2 mmol/L 22 21*   BUN mg/dL 30* 39*   CREATININE mg/dL 0 87 1 18*   CALCIUM mg/dL 8 6 9 4   ALK PHOS U/L  --  67 4   ALT U/L  --  15   AST U/L  --  18     Results from last 7 days   Lab Units 09/22/20  1521   INR  0 98       * I Have Reviewed All Lab Data Listed Above  * Additional Pertinent Lab Tests Reviewed: All Labs Within Last 24 Hours Reviewed    Imaging:    Imaging Reports Reviewed Today Include:  None  Imaging Personally Reviewed by Myself Includes:  None  Recent Cultures (last 7 days):     Results from last 7 days   Lab Units 09/22/20  1551 09/22/20  1521   BLOOD CULTURE   --  Received in Microbiology Lab  Culture in Progress  Received in Microbiology Lab  Culture in Progress     GRAM STAIN RESULT  No polys seen*  1+ Gram negative rods*  Rare Gram positive rods*  --        Last 24 Hours Medication List:   Current Facility-Administered Medications   Medication Dose Route Frequency Provider Last Rate    acetaminophen  650 mg Oral Q6H PRN Dai Nguyen MD      buPROPion  300 mg Oral Daily Dai Nguyen MD      busPIRone  15 mg Oral BID Dai Nguyen MD      cholecalciferol  1,000 Units Oral Daily Dai Nguyen MD      cyclobenzaprine  5 mg Oral BID PRN Dai Nguyen MD      enoxaparin  40 mg Subcutaneous Daily Dai Nguyen MD      escitalopram  10 mg Oral Daily Dai Nguyen MD      ferrous sulfate  325 mg Oral Daily With Breakfast Dai Nguyen MD      LORazepam  0 5 mg Oral HS Dai Nguyen MD      meropenem  1,000 mg Intravenous Q12H Dai Nguyen MD 1,000 mg (09/23/20 0707)    midodrine  10 mg Oral TID AC Dai Nguyen MD      oxyCODONE  5 mg Oral Q6H PRN Dai Nguyen MD  [START ON 9/24/2020] saccharomyces boulardii  500 mg Oral Daily Ramin Hamm MD      vancomycin  125 mg Oral Q12H [facility] Ramin Hamm MD          Today, Patient Was Seen By: Ramin Hamm MD    ** Please Note: Dragon 360 Dictation voice to text software may have been used in the creation of this document   **

## 2020-09-23 NOTE — ASSESSMENT & PLAN NOTE
BP today of 93/54 around time of assessment    - Continue to monitor hemodynamics  - Continue midodrine  Hold for SBP greater than 130 mmHg

## 2020-09-24 LAB
ANION GAP SERPL CALCULATED.3IONS-SCNC: 7 MMOL/L (ref 4–13)
BASOPHILS # BLD AUTO: 0.02 THOUSANDS/ΜL (ref 0–0.1)
BASOPHILS NFR BLD AUTO: 0 % (ref 0–1)
BUN SERPL-MCNC: 25 MG/DL (ref 6–20)
CALCIUM SERPL-MCNC: 8.6 MG/DL (ref 8.4–10.2)
CHLORIDE SERPL-SCNC: 106 MMOL/L (ref 96–108)
CO2 SERPL-SCNC: 22 MMOL/L (ref 22–33)
CREAT SERPL-MCNC: 0.83 MG/DL (ref 0.4–1.1)
EOSINOPHIL # BLD AUTO: 0.25 THOUSAND/ΜL (ref 0–0.61)
EOSINOPHIL NFR BLD AUTO: 3 % (ref 0–6)
ERYTHROCYTE [DISTWIDTH] IN BLOOD BY AUTOMATED COUNT: 16.1 % (ref 11.6–15.1)
GFR SERPL CREATININE-BSD FRML MDRD: 71 ML/MIN/1.73SQ M
GLUCOSE SERPL-MCNC: 70 MG/DL (ref 65–140)
HCT VFR BLD AUTO: 30.6 % (ref 34.8–46.1)
HGB BLD-MCNC: 9.6 G/DL (ref 11.5–15.4)
IMM GRANULOCYTES # BLD AUTO: 0.03 THOUSAND/UL (ref 0–0.2)
IMM GRANULOCYTES NFR BLD AUTO: 0 % (ref 0–2)
LYMPHOCYTES # BLD AUTO: 1.76 THOUSANDS/ΜL (ref 0.6–4.47)
LYMPHOCYTES NFR BLD AUTO: 19 % (ref 14–44)
MCH RBC QN AUTO: 28.2 PG (ref 26.8–34.3)
MCHC RBC AUTO-ENTMCNC: 31.4 G/DL (ref 31.4–37.4)
MCV RBC AUTO: 90 FL (ref 82–98)
MONOCYTES # BLD AUTO: 0.86 THOUSAND/ΜL (ref 0.17–1.22)
MONOCYTES NFR BLD AUTO: 9 % (ref 4–12)
MRSA NOSE QL CULT: NORMAL
NEUTROPHILS # BLD AUTO: 6.48 THOUSANDS/ΜL (ref 1.85–7.62)
NEUTS SEG NFR BLD AUTO: 69 % (ref 43–75)
PLATELET # BLD AUTO: 276 THOUSANDS/UL (ref 149–390)
PMV BLD AUTO: 10.3 FL (ref 8.9–12.7)
POTASSIUM SERPL-SCNC: 3.9 MMOL/L (ref 3.5–5)
RBC # BLD AUTO: 3.4 MILLION/UL (ref 3.81–5.12)
SODIUM SERPL-SCNC: 135 MMOL/L (ref 133–145)
WBC # BLD AUTO: 9.4 THOUSAND/UL (ref 4.31–10.16)

## 2020-09-24 PROCEDURE — 97167 OT EVAL HIGH COMPLEX 60 MIN: CPT

## 2020-09-24 PROCEDURE — 97530 THERAPEUTIC ACTIVITIES: CPT

## 2020-09-24 PROCEDURE — 80048 BASIC METABOLIC PNL TOTAL CA: CPT | Performed by: INTERNAL MEDICINE

## 2020-09-24 PROCEDURE — 85025 COMPLETE CBC W/AUTO DIFF WBC: CPT | Performed by: INTERNAL MEDICINE

## 2020-09-24 PROCEDURE — 97116 GAIT TRAINING THERAPY: CPT

## 2020-09-24 PROCEDURE — 99232 SBSQ HOSP IP/OBS MODERATE 35: CPT | Performed by: INTERNAL MEDICINE

## 2020-09-24 RX ADMIN — OXYCODONE HYDROCHLORIDE 5 MG: 5 TABLET ORAL at 05:41

## 2020-09-24 RX ADMIN — SODIUM CHLORIDE 250 ML: 0.9 INJECTION, SOLUTION INTRAVENOUS at 14:32

## 2020-09-24 RX ADMIN — MIDODRINE HYDROCHLORIDE 10 MG: 5 TABLET ORAL at 18:07

## 2020-09-24 RX ADMIN — Medication 250 MG: at 18:07

## 2020-09-24 RX ADMIN — BUPROPION 300 MG: 150 TABLET, EXTENDED RELEASE ORAL at 09:29

## 2020-09-24 RX ADMIN — MIDODRINE HYDROCHLORIDE 10 MG: 5 TABLET ORAL at 13:27

## 2020-09-24 RX ADMIN — BUSPIRONE HYDROCHLORIDE 15 MG: 5 TABLET ORAL at 09:29

## 2020-09-24 RX ADMIN — FERROUS SULFATE TAB 325 MG (65 MG ELEMENTAL FE) 325 MG: 325 (65 FE) TAB at 09:29

## 2020-09-24 RX ADMIN — LORAZEPAM 0.5 MG: 0.5 TABLET ORAL at 22:52

## 2020-09-24 RX ADMIN — Medication 250 MG: at 09:29

## 2020-09-24 RX ADMIN — Medication 125 MG: at 09:32

## 2020-09-24 RX ADMIN — ESCITALOPRAM OXALATE 10 MG: 10 TABLET ORAL at 09:29

## 2020-09-24 RX ADMIN — BUSPIRONE HYDROCHLORIDE 15 MG: 5 TABLET ORAL at 18:07

## 2020-09-24 RX ADMIN — VITAMIN D, TAB 1000IU (100/BT) 1000 UNITS: 25 TAB at 09:29

## 2020-09-24 RX ADMIN — ENOXAPARIN SODIUM 40 MG: 100 INJECTION SUBCUTANEOUS at 09:32

## 2020-09-24 RX ADMIN — MEROPENEM 1000 MG: 1 INJECTION, POWDER, FOR SOLUTION INTRAVENOUS at 18:07

## 2020-09-24 RX ADMIN — MEROPENEM 1000 MG: 1 INJECTION, POWDER, FOR SOLUTION INTRAVENOUS at 05:42

## 2020-09-24 RX ADMIN — MIDODRINE HYDROCHLORIDE 10 MG: 5 TABLET ORAL at 09:29

## 2020-09-24 RX ADMIN — Medication 125 MG: at 20:02

## 2020-09-24 RX ADMIN — OXYCODONE HYDROCHLORIDE 5 MG: 5 TABLET ORAL at 20:02

## 2020-09-24 NOTE — PHYSICAL THERAPY NOTE
PHYSICAL THERAPY TREATMENT     NAME:  Ortega Noble  DATE: 09/24/20    AGE:   65 y o  Mrn:   [id number]  Length Of Stay: 2    ADMIT DX:  Leg pain [M79 606]  Leg swelling [M79 89]  Cellulitis of right leg [O51 472]  Left leg cellulitis [D04 797]    Past Medical History:   Diagnosis Date    Anemia     Anxiety     Chronic back pain     Hyperkalemia     Hypertension     Hypotension     Lymphedema     BRANDYN (obstructive sleep apnea)     Psychiatric disorder     depression     Past Surgical History:   Procedure Laterality Date    APPENDECTOMY      GASTRIC BYPASS      obesity        Performed at least 2 patient identifiers during session: Name and Birthday         09/24/20 1426   PT Last Visit   PT Visit Date 09/24/20   Pain Assessment   Pain Assessment Tool FLACC   Rawls-Aquino FACES Pain Rating 10   Pain Location/Orientation Orientation: Bilateral;Orientation: Lower; Location: Leg;Location: Knee   Pain Onset/Description Onset: Ongoing;Frequency: Constant/Continuous; Descriptor: Aching;Descriptor: Discomfort   Effect of Pain on Daily Activities limits all aspects of mobility and mobility tolerance    Patient's Stated Pain Goal No pain   Hospital Pain Intervention(s) Medication (See MAR); Repositioned; Ambulation/increased activity; Emotional support   Pain Rating: FLACC (Rest) - Face 1   Pain Rating: FLACC (Rest) - Legs 1   Pain Rating: FLACC (Rest) - Activity 1   Pain Rating: FLACC (Rest) - Cry 1   Pain Rating: FLACC (Rest) - Consolability 1   Score: FLACC (Rest) 5   Pain Rating: FLACC (Activity) - Face 2   Pain Rating: FLACC (Activity) - Legs 1   Pain Rating: FLACC (Activity) - Activity 1   Pain Rating: FLACC (Activity) - Cry 2   Pain Rating: FLACC (Activity) - Consolability 1   Score: FLACC (Activity) 7   Restrictions/Precautions   Weight Bearing Precautions Per Order No   Other Precautions Bed Alarm; Chair Alarm;Pain; Fall Risk   General   Chart Reviewed Yes   Additional Pertinent History no significant change since previous session    Response to Previous Treatment Patient with no complaints from previous session  Family/Caregiver Present No   Cognition   Overall Cognitive Status WFL   Arousal/Participation Alert; Cooperative   Attention Attends with cues to redirect   Orientation Level Oriented X4   Memory Within functional limits   Following Commands Follows multistep commands with increased time or repetition   Comments Pt continues to be emotionally labile t/o session  Subjective   Subjective "I just can't take the pain "    Bed Mobility   Supine to Sit 2  Maximal assistance   Additional items Assist x 1;HOB elevated; Bedrails; Increased time required;Verbal cues;LE management   Sit to Supine Unable to assess   Additional Comments Pt was left seated in bedside recliner chair at end of session  Transfers   Sit to Stand 3  Moderate assistance   Additional items Assist x 2   Stand to Sit 3  Moderate assistance   Additional items Assist x 2   Ambulation/Elevation   Gait pattern Wide KEVIN; Decreased foot clearance;Shuffling; Foward flexed; Short stride; Step to;Excessively slow   Gait Assistance 4  Minimal assist  (CHAIR FOLLOW PROVIDED)   Additional items Assist x 1;Verbal cues; Tactile cues   Assistive Device Rolling walker   Distance 24ft x1   Stair Management Assistance Not tested   Balance   Static Sitting Fair   Dynamic Sitting Fair -   Static Standing Poor   Dynamic Standing Poor   Ambulatory Poor   Endurance Deficit   Endurance Deficit Yes   Activity Tolerance   Activity Tolerance Patient limited by fatigue;Patient limited by pain   Nurse Made Aware Yes, spoke with GEO Lino    Assessment   Prognosis Fair   Problem List Decreased strength;Decreased range of motion;Decreased endurance; Impaired balance;Decreased mobility; Decreased safety awareness;Decreased skin integrity;Pain   Assessment Pt seen for PT treatment today with focus on gait training   Pt presents semi-supine in bed, c/o continued ongoing pain in B lower legs and knees, however is agreeable to participate in session after increased encouragement from therapist  Pt requires increased assistance for all aspects of mobility on this date, as compared to previous session  Partial co-tx with OT services in order to maximize safety and functional mobility  Pt requires MAXA 1 for supine>short sit EOB, demonstrates fair(-) supported sitting balance EOB, demonstrates decreased sitting tolerance as compared to previous sessions  Pt requires MODA of 2 person for sit>stand transfer with use of RW  Pt ambulates 24ft with RW and GALEN, chair follow provided  Further ambulation training not able to be completed due to severe pain in B LEs  At end of session pt was left seated in bedside recliner chair with chair alarm engaged and needs in place, care returned to RN  Continue PT POC as able and appropriate  Recommend STR upon d/c in order to maximize indep and safety with mobility, decrease burden of care, and improve functional outcomes  Barriers to Discharge Decreased caregiver support   Goals   Patient Goals "to have less pain"    [facility] Expiration Date 10/03/20   Short Term Goal #1 1  Pt will complete all bed mobility in hospital bed with [facility], in order to promote increased OOB functional mobility to improve overall activity tolerance  2  Pt will complete all transfers with RW and S, in order to increase safety with functional mobility  3  Pt will ambulate >50ft with RW and S in order to increase safety with household distance functional mobility  4  Pt will improve B LE strength to >/= 3+/5 MMT throughout, in order to increase safety with functional mobility and decrease risk of falls  5  Pt will improve Barthel Index score to >/= 45/100 in order to increase independence and decrease risk of falls  6  Pt will demonstrate understanding and independence with LE strengthening HEP   8  Pt will improve standing balance by >/= 1/2 grade in order to promote safety and increased independence with mobility  7  Pt will tolerate >3hrs OOB in recliner chair with good endurance evidenced by no significant change in vitals or behaviors  PT Treatment Day 2   Plan   Treatment/Interventions Functional transfer training;LE strengthening/ROM; Therapeutic exercise; Endurance training;Patient/family training;Bed mobility;Gait training;Spoke to nursing;Spoke to case management;OT;Spoke to MD   Progress Slow progress, decreased activity tolerance   PT Frequency 5x/wk   Recommendation   PT Discharge Recommendation Post-Acute Rehabilitation Services   Equipment Recommended [place] [facility]; Wheelchair  (pt owns RW and manual WC)   PT - OK to Discharge Yes  (TO REHAB)       Time in: 1400  Time out: 1426  Total treatment time: 26 minutes     Randi So, PT, DPT  9/24/2020

## 2020-09-24 NOTE — PLAN OF CARE
Problem: PHYSICAL THERAPY ADULT  Goal: Performs mobility at highest level of function for planned discharge setting  See evaluation for individualized goals  Description: Treatment/Interventions: Functional transfer training, LE strengthening/ROM, Therapeutic exercise, Endurance training, Patient/family training, Bed mobility, Gait training, Spoke to nursing, Spoke to case management, OT, Spoke to MD  Equipment Recommended: Eve De La Rosa owns RW and manual WC)       See flowsheet documentation for full assessment, interventions and recommendations  Outcome: Progressing  Note: Prognosis: Fair  Problem List: Decreased strength, Decreased range of motion, Decreased endurance, Impaired balance, Decreased mobility, Decreased safety awareness, Decreased skin integrity, Pain  Assessment: Pt seen for PT treatment today with focus on gait training  Pt presents semi-supine in bed, c/o continued ongoing pain in B lower legs and knees, however is agreeable to participate in session after increased encouragement from therapist  Pt requires increased assistance for all aspects of mobility on this date, as compared to previous session  Partial co-tx with OT services in order to maximize safety and functional mobility  Pt requires MAXA 1 for supine>short sit EOB, demonstrates fair(-) supported sitting balance EOB, demonstrates decreased sitting tolerance as compared to previous sessions  Pt requires MODA of 2 person for sit>stand transfer with use of RW  Pt ambulates 24ft with RW and GALEN, chair follow provided  Further ambulation training not able to be completed due to severe pain in B LEs  At end of session pt was left seated in bedside recliner chair with chair alarm engaged and needs in place, care returned to RN  Continue PT POC as able and appropriate  Recommend STR upon d/c in order to maximize indep and safety with mobility, decrease burden of care, and improve functional outcomes     Barriers to Discharge: Decreased caregiver support     PT Discharge Recommendation: Post-Acute Rehabilitation Services     PT - OK to Discharge: [facility])    See flowsheet documentation for full assessment

## 2020-09-24 NOTE — OCCUPATIONAL THERAPY NOTE
Occupational Therapy Evaluation       09/24/20 2743   Note Type   Note type Eval only   Restrictions/Precautions   Other Precautions Chair Alarm; Bed Alarm; Fall Risk;Pain   Pain Assessment   Pain Assessment Tool Rawls-Baker FACES   Pain Score Worst Possible Pain   Pain Location/Orientation Orientation: Bilateral;Location: Knee; Location: Foot   [facility]   [facility];Ramped entrance   [address] bars in [place] [address]; Wheelchair-manual;Other (Comment)  (Recliner lift chair)   Prior Function   Level of [place] Needs assistance with ADLs and functional mobility   Lives With Alone   Receives Help From Family;Friend(s); Home health  (Home PT 2x/week)   ADL Assistance Needs assistance   IADLs Needs assistance   Comments Patient reports she lives alone but sister visits daily to assist  Also has VNA for wound care and HHPT 2x/week  Patient reports neightbor also assists her at times  Patient reports requiring assist for lower body ADLs due to BLE wounds, independent in upper body ADLs/grooming/self feeding  Patient ambulates when home PT is present, otherwise mostly stays in wheelchair and sleeps in recliner chair   ADL   Eating Assistance 5  [address] 1  Total Assistance   LB Bathing Deficit   (Washing with washcloth after incontinence)    [address] 4  Minimal Assistance    [address] 1  Total Assistance   LB Dressing Deficit Don/doff R sock; Don/doff L sock  (Don diaper like underwear)   Toileting Assistance  1  Total Assistance   Toileting Deficit   (Pt incontinent of urine)   Bed Mobility   Supine to Sit 2  Maximal assistance   Additional items Assist x 1; Increased time required;LE management   Transfers   Sit to Stand 3  Moderate assistance Additional items Assist x 2   Stand to Sit 3  Moderate assistance   Additional items Assist x 2   Stand pivot 3  Moderate assistance   Additional items Assist x 2  (RW)   Functional Mobility   Functional Mobility 4  Minimal assistance   Additional Comments Patient ambulated short household distance in room with RW  and min assist +close chair follow   Balance   Static Sitting Fair   Dynamic Sitting Fair -   Static Standing Poor   Dynamic Standing Poor   Activity Tolerance   Activity Tolerance Patient limited by fatigue;Patient limited by pain   RUE Assessment   RUE Assessment WFL   LUE Assessment   LUE Assessment WFL   Cognition   Overall Cognitive Status WFL   Arousal/Participation Alert; Cooperative   Attention Attends with cues to redirect   Orientation Level Oriented X4   Following Commands Follows all commands and directions without difficulty   Assessment   Limitation Decreased ADL status; Decreased UE strength;Decreased Safe judgement during ADL;Decreased endurance;Decreased high-level ADLs; Decreased self-care trans   Prognosis Good   Assessment Patient evaluated by Occupational Therapy  Patient admitted with Recurrent cellulitis of lower extremity  The patients occupational profile, medical and therapy history includes a extensive additional review of physical, cognitive, or psychosocial history related to current functional performance  Comorbidities affecting functional mobility and ADLS include: anemia, anxiety, hypotension, chronic back pain, psychiatric disorder, BRANDYN, lymphedema  Prior to admission, patient was requiring assist for functional mobility with RW, requiring assist for ADLS and requiring assist for IADLS    The evaluation identifies the following performance deficits: weakness, impaired balance, decreased endurance, increased fall risk, new onset of impairment of functional mobility, decreased ADLS, pain, decreased activity tolerance, decreased safety awareness and decreased strength, that result in activity limitations and/or participation restrictions  This evaluation requires clinical decision making of high complexity, because the patient presents with comorbidites that affect occupational performance and required significant modification of tasks or assistance with consideration of multiple treatment options  The Barthel Index was used as a functional outcome tool presenting with a score of 30, indicating marked limitations of functional mobility and ADLS  Patient will benefit from skilled Occupational Therapy services to address above deficits and facilitate a safe return to prior level of function  Goals   Patient Goals To move with less pain   STG Time Frame   (1-7 days)   Short Term Goal  Goals established to promote patient goal of moving with less pain:  Patient will increase standing tolerance to 5 minutes during ADL task to decrease assistance level and decrease fall risk; Patient will increase bed mobility to mod assist in preparation for ADLS and transfers;  Patient will increase functional mobility to and from bathroom with rolling walker with mod assist to increase performance with ADLS and to use a toilet; Patient will tolerate 5 minutes of UE ROM/strengthening to increase general activity tolerance and performance in ADLS/IADLS; Patient will improve functional activity tolerance to 5 minutes of sustained functional tasks to increase participation in basic self-care and decrease assistance level;  Patient will be able to to verbalize understanding and perform energy conservation/proper body mechanics during ADLS and functional mobility at least 75% of the time with minimal cueing to decrease signs of fatigue and increase stamina to return to prior level of function; Patient will increase dynamic sitting balance to fair to improve the ability to sit at edge of bed or on a chair for ADLS;  Patient will increase static/dynamic standing balance to poor+ to improve postural stability and decrease fall risk during standing ADLS and transfers  LTG Time Frame   (8-14 days)   Long Term Goal Patient will increase standing tolerance to 10 minutes during ADL task to decrease assistance level and decrease fall risk; Patient will increase bed mobility to min assist in preparation for ADLS and transfers; Patient will increase functional mobility to and from bathroom with rolling walker with min assist to increase performance with ADLS and to use a toilet; Patient will tolerate 10 minutes of UE ROM/strengthening to increase general activity tolerance and performance in ADLS/IADLS; Patient will improve functional activity tolerance to 10 minutes of sustained functional tasks to increase participation in basic self-care and decrease assistance level;  Patient will be able to to verbalize understanding and perform energy conservation/proper body mechanics during ADLS and functional mobility at least 90% of the time with nocueing to decrease signs of fatigue and increase stamina to return to prior level of function; Patient will increase static/dynamic sitting balance to fair+ to improve the ability to sit at edge of bed or on a chair for ADLS;  Patient will increase static/dynamic standing balance to fair- to improve postural stability and decrease fall risk during standing ADLS and transfers  Functional Transfer Goals   Pt Will Perform All Functional Transfers   (STG mod assist, LTG min assist)   ADL Goals   Pt Will Perform Eating   (LTG independent)   Pt Will Perform Grooming   (LTG independent)   Pt Will Perform Bathing   (STG max assist, LTG mod assist)   Pt Will Perform UE Dressing   (STG min assist, LTG supervision)   Pt Will Perform LE Dressing   (STG max assist, LTG mod assist)   Pt Will Perform Toileting   (STG mod assist, LTG min assist)   Plan   Treatment Interventions ADL retraining;Functional transfer training;UE strengthening/ROM; Endurance training;Patient/family training;Equipment evaluation/education; Compensatory technique education;Continued evaluation; Energy conservation; Activityengagement   Goal Expiration Date 10/08/20   OT Frequency 1-2x/wk   Recommendation   OT Discharge Recommendation Post-Acute Rehabilitation Services   Barthel Index   Feeding 5   Bathing 0   Grooming Score 0   Dressing Score 5   Bladder Score 5   Bowels Score 5   Toilet Use Score 5   Transfers (Bed/Chair) Score 5   Mobility (Level Surface) Score 0   Stairs Score 0   Barthel Index Score 30   Licensure    Keven Mcdowell, OTR/L

## 2020-09-24 NOTE — ASSESSMENT & PLAN NOTE
Wounds banadaged and clean  - Infectious disease consult appreciated  - continue meropenem IV  - continue vancomycin p o  For C diff prophylaxis  - History of MDRO wound cultures  Will stain showed no PMNs, +1 gram negative rods and rare    Gram-positive rods  Follow-up wound culture  - Provide wound care   Xeroform, ABD pads, Kerlix, Safe-Clens and change dressings twice daily if possible

## 2020-09-24 NOTE — PROGRESS NOTES
Progress Note - Leonor Zelaya 1950, 65 y o  female MRN: [id number]    Unit/Bed#: -01 Encounter: [id number]    Primary Care Provider: Paul Lund MD   Date and time admitted to hospital: 9/22/2020  2:59 PM        * Recurrent cellulitis of lower extremity  Assessment & Plan  Wounds banadaged and clean  - Infectious disease consult appreciated  - continue meropenem IV  - continue vancomycin p o  For C diff prophylaxis  - History of MDRO wound cultures  Will stain showed no PMNs, +1 gram negative rods and rare    Gram-positive rods  Follow-up wound culture  - Provide wound care  Xeroform, ABD pads, Kerlix, Safe-Clens and change dressings twice daily if possible      Anxiety  Assessment & Plan  Continue antidepressant and antianxiety medications  Idiopathic hypotension  Assessment & Plan  BP today of 93/54 around time of assessment    - Continue to monitor hemodynamics  - Continue midodrine  Hold for SBP greater than 130 mmHg  Depression  Assessment & Plan  Continue antidepressant and antianxiety medications  VTE Pharmacologic Prophylaxis:  Enoxaparin  Pharmacologic:   Mechanical VTE Prophylaxis in Place: No    Patient Centered Rounds: I have performed bedside rounds with nursing staff today  Discussions with Specialists or Other Care Team Provider:  None  Education and Discussions with Family / Patient:  None  Time Spent for Care: 30 minutes  More than 50% of total time spent on counseling and coordination of care as described above  Current Length of Stay: 2 day(s)    Current Patient Status: Inpatient   Certification Statement: The patient will continue to require additional inpatient hospital stay due to Management of bilateral cellulitis with IV antibiotics and wound care  Discharge Plan / Estimated Discharge Date:  As above /estimated discharge on or before 9/2 08/2020        Code Status: Level 1 - Full Code      Subjective:   Patient interviewed at bedside today  Patient states that she still in a bad mood due to her medical condition  Patient denies any fevers, chills, chest pain, palpitations, shortness breath, generalized pain or at bilateral lower extremities  Objective:     Vitals:   Temp (24hrs), Av °F (36 7 °C), Min:97 9 °F (36 6 °C), Max:98 3 °F (36 8 °C)    Temp:  [97 9 °F (36 6 °C)-98 3 °F (36 8 °C)] 98 3 °F (36 8 °C)  HR:  [72-92] 72  Resp:  [18] 18  BP: ()/(45-64) 111/64  SpO2:  [97 %-99 %] 99 %  Body mass index is 28 94 kg/m²  Input and Output Summary (last 24 hours):     No intake or output data in the 24 hours ending 20    Physical Exam:     Physical Exam  Vitals signs reviewed  Constitutional:       Appearance: Normal appearance  HENT:      Head: Normocephalic and atraumatic  Mouth/Throat:      Mouth: Mucous membranes are moist    Eyes:      Extraocular Movements: Extraocular movements intact  Cardiovascular:      Rate and Rhythm: Normal rate and regular rhythm  Heart sounds: S1 normal and S2 normal    Pulmonary:      Effort: Pulmonary effort is normal       Breath sounds: Normal breath sounds  Abdominal:      Palpations: Abdomen is soft  Skin:     Comments: Bilateral lower extremities with bandages intact and clean  Neurological:      Mental Status: She is alert and oriented to person, place, and time  Psychiatric:         Mood and Affect: Mood is not depressed  Affect is tearful           Behavior: Behavior normal          Additional Data:     Labs:    Results from last 7 days   Lab Units 20  0544   WBC Thousand/uL 9 40   HEMOGLOBIN g/dL 9 6*   HEMATOCRIT % 30 6*   PLATELETS Thousands/uL 276   NEUTROS PCT % 69   LYMPHS PCT % 19   MONOS PCT % 9   EOS PCT % 3     Results from last 7 days   Lab Units 20  0544  20  1521   POTASSIUM mmol/L 3 9   < > 4 3   CHLORIDE mmol/L 106   < > 102   CO2 mmol/L 22   < > 21*   BUN mg/dL 25*   < > 39*   CREATININE mg/dL 0 83   < > 1 18*   CALCIUM mg/dL 8 6   < > 9 4   ALK PHOS U/L  --   --  67 4   ALT U/L  --   --  15   AST U/L  --   --  18    < > = values in this interval not displayed  Results from last 7 days   Lab Units 09/22/20  1521   INR  0 98       * I Have Reviewed All Lab Data Listed Above  * Additional Pertinent Lab Tests Reviewed: All Labs Within Last 24 Hours Reviewed    Imaging:    Imaging Reports Reviewed Today Include:  None  Imaging Personally Reviewed by Myself Includes:  None  Recent Cultures (last 7 days):     Results from last 7 days   Lab Units 09/22/20  1551 09/22/20  1521   BLOOD CULTURE   --  No Growth at 24 hrs  No Growth at 24 hrs  GRAM STAIN RESULT  No polys seen*  1+ Gram negative rods*  Rare Gram positive rods*  --    WOUND CULTURE  Culture results to follow    --        Last 24 Hours Medication List:   Current Facility-Administered Medications   Medication Dose Route Frequency Provider Last Rate    acetaminophen  650 mg Oral Q6H PRN Columba Adorno MD      buPROPion  300 mg Oral Daily Columba Adorno MD      busPIRone  15 mg Oral BID Columba Adorno MD      cholecalciferol  1,000 Units Oral Daily Columba Adorno MD      cyclobenzaprine  5 mg Oral BID PRN Columba Adorno MD      enoxaparin  40 mg Subcutaneous Daily Columba Adorno MD      escitalopram  10 mg Oral Daily Columba Adorno MD      ferrous sulfate  325 mg Oral Daily With Breakfast Columba Adorno MD      LORazepam  0 5 mg Oral HS Columba Adorno MD      meropenem  1,000 mg Intravenous Q12H Columba Adorno MD 1,000 mg (09/24/20 1807)    midodrine  10 mg Oral TID AC Columba Adorno MD      oxyCODONE  5 mg Oral Q6H PRN Columba Adorno MD      saccharomyces boulardii  250 mg Oral BID Columba Adorno MD      vancomycin  125 mg Oral Q12H Danna Rivera MD          Today, Patient Was Seen By: Columba Adorno MD    ** Please Note: Dragon 360 Dictation voice to text software may have been used in the creation of this document   **

## 2020-09-25 ENCOUNTER — TELEPHONE (OUTPATIENT)
Dept: OTHER | Facility: OTHER | Age: 70
End: 2020-09-25

## 2020-09-25 VITALS
HEIGHT: 59 IN | OXYGEN SATURATION: 97 % | RESPIRATION RATE: 18 BRPM | TEMPERATURE: 98 F | BODY MASS INDEX: 28.89 KG/M2 | HEART RATE: 96 BPM | SYSTOLIC BLOOD PRESSURE: 92 MMHG | WEIGHT: 143.3 LBS | DIASTOLIC BLOOD PRESSURE: 56 MMHG

## 2020-09-25 LAB
ANION GAP SERPL CALCULATED.3IONS-SCNC: 6 MMOL/L (ref 4–13)
BACTERIA WND AEROBE CULT: ABNORMAL
BASOPHILS # BLD AUTO: 0.03 THOUSANDS/ΜL (ref 0–0.1)
BASOPHILS NFR BLD AUTO: 0 % (ref 0–1)
BUN SERPL-MCNC: 22 MG/DL (ref 6–20)
CALCIUM SERPL-MCNC: 8.6 MG/DL (ref 8.4–10.2)
CHLORIDE SERPL-SCNC: 106 MMOL/L (ref 96–108)
CO2 SERPL-SCNC: 23 MMOL/L (ref 22–33)
CREAT SERPL-MCNC: 0.75 MG/DL (ref 0.4–1.1)
EOSINOPHIL # BLD AUTO: 0.25 THOUSAND/ΜL (ref 0–0.61)
EOSINOPHIL NFR BLD AUTO: 3 % (ref 0–6)
ERYTHROCYTE [DISTWIDTH] IN BLOOD BY AUTOMATED COUNT: 15.9 % (ref 11.6–15.1)
GFR SERPL CREATININE-BSD FRML MDRD: 80 ML/MIN/1.73SQ M
GLUCOSE SERPL-MCNC: 76 MG/DL (ref 65–140)
GRAM STN SPEC: ABNORMAL
HCT VFR BLD AUTO: 30 % (ref 34.8–46.1)
HGB BLD-MCNC: 9.4 G/DL (ref 11.5–15.4)
IMM GRANULOCYTES # BLD AUTO: 0.03 THOUSAND/UL (ref 0–0.2)
IMM GRANULOCYTES NFR BLD AUTO: 0 % (ref 0–2)
LYMPHOCYTES # BLD AUTO: 1.75 THOUSANDS/ΜL (ref 0.6–4.47)
LYMPHOCYTES NFR BLD AUTO: 21 % (ref 14–44)
MCH RBC QN AUTO: 28.3 PG (ref 26.8–34.3)
MCHC RBC AUTO-ENTMCNC: 31.3 G/DL (ref 31.4–37.4)
MCV RBC AUTO: 90 FL (ref 82–98)
MONOCYTES # BLD AUTO: 0.69 THOUSAND/ΜL (ref 0.17–1.22)
MONOCYTES NFR BLD AUTO: 9 % (ref 4–12)
NEUTROPHILS # BLD AUTO: 5.41 THOUSANDS/ΜL (ref 1.85–7.62)
NEUTS SEG NFR BLD AUTO: 67 % (ref 43–75)
PLATELET # BLD AUTO: 281 THOUSANDS/UL (ref 149–390)
PMV BLD AUTO: 10.1 FL (ref 8.9–12.7)
POTASSIUM SERPL-SCNC: 3.8 MMOL/L (ref 3.5–5)
RBC # BLD AUTO: 3.32 MILLION/UL (ref 3.81–5.12)
SARS-COV-2 RNA RESP QL NAA+PROBE: NEGATIVE
SODIUM SERPL-SCNC: 135 MMOL/L (ref 133–145)
WBC # BLD AUTO: 8.16 THOUSAND/UL (ref 4.31–10.16)

## 2020-09-25 PROCEDURE — 80048 BASIC METABOLIC PNL TOTAL CA: CPT | Performed by: INTERNAL MEDICINE

## 2020-09-25 PROCEDURE — 97116 GAIT TRAINING THERAPY: CPT

## 2020-09-25 PROCEDURE — 99239 HOSP IP/OBS DSCHRG MGMT >30: CPT | Performed by: INTERNAL MEDICINE

## 2020-09-25 PROCEDURE — 85025 COMPLETE CBC W/AUTO DIFF WBC: CPT | Performed by: INTERNAL MEDICINE

## 2020-09-25 PROCEDURE — 97530 THERAPEUTIC ACTIVITIES: CPT

## 2020-09-25 PROCEDURE — U0003 INFECTIOUS AGENT DETECTION BY NUCLEIC ACID (DNA OR RNA); SEVERE ACUTE RESPIRATORY SYNDROME CORONAVIRUS 2 (SARS-COV-2) (CORONAVIRUS DISEASE [COVID-19]), AMPLIFIED PROBE TECHNIQUE, MAKING USE OF HIGH THROUGHPUT TECHNOLOGIES AS DESCRIBED BY CMS-2020-01-R: HCPCS | Performed by: INTERNAL MEDICINE

## 2020-09-25 RX ORDER — CEFEPIME HYDROCHLORIDE 1 G/50ML
1000 INJECTION, SOLUTION INTRAVENOUS EVERY 12 HOURS
Status: DISCONTINUED | OUTPATIENT
Start: 2020-09-25 | End: 2020-09-25 | Stop reason: HOSPADM

## 2020-09-25 RX ORDER — ACETAMINOPHEN 325 MG/1
650 TABLET ORAL EVERY 6 HOURS PRN
Qty: 15 TABLET | Refills: 0 | Status: SHIPPED | OUTPATIENT
Start: 2020-09-25

## 2020-09-25 RX ORDER — BUPROPION HYDROCHLORIDE 300 MG/1
300 TABLET ORAL DAILY
Qty: 30 TABLET | Refills: 0 | Status: SHIPPED | OUTPATIENT
Start: 2020-09-26 | End: 2020-10-02 | Stop reason: SDUPTHER

## 2020-09-25 RX ORDER — CEFEPIME HYDROCHLORIDE 2 G/50ML
2000 INJECTION, SOLUTION INTRAVENOUS EVERY 12 HOURS
Status: DISCONTINUED | OUTPATIENT
Start: 2020-09-25 | End: 2020-09-25

## 2020-09-25 RX ORDER — CEFEPIME HYDROCHLORIDE 1 G/50ML
1000 INJECTION, SOLUTION INTRAVENOUS EVERY 12 HOURS
Qty: 600 ML | Refills: 0 | Status: SHIPPED | OUTPATIENT
Start: 2020-09-25 | End: 2020-10-01 | Stop reason: ALTCHOICE

## 2020-09-25 RX ORDER — VANCOMYCIN HYDROCHLORIDE 125 MG/1
125 CAPSULE ORAL 2 TIMES DAILY
Qty: 18 CAPSULE | Refills: 0 | Status: SHIPPED | OUTPATIENT
Start: 2020-09-25 | End: 2020-10-04

## 2020-09-25 RX ORDER — CEFEPIME HYDROCHLORIDE 1 G/50ML
1000 INJECTION, SOLUTION INTRAVENOUS EVERY 12 HOURS
Qty: 600 ML | Refills: 0 | Status: SHIPPED | OUTPATIENT
Start: 2020-09-25 | End: 2020-09-25

## 2020-09-25 RX ORDER — LIDOCAINE 50 MG/G
1 PATCH TOPICAL DAILY
Status: DISCONTINUED | OUTPATIENT
Start: 2020-09-25 | End: 2020-09-25 | Stop reason: HOSPADM

## 2020-09-25 RX ADMIN — MIDODRINE HYDROCHLORIDE 10 MG: 5 TABLET ORAL at 09:42

## 2020-09-25 RX ADMIN — LIDOCAINE 5% 1 PATCH: 700 PATCH TOPICAL at 09:43

## 2020-09-25 RX ADMIN — BUSPIRONE HYDROCHLORIDE 15 MG: 5 TABLET ORAL at 09:42

## 2020-09-25 RX ADMIN — MIDODRINE HYDROCHLORIDE 10 MG: 5 TABLET ORAL at 16:20

## 2020-09-25 RX ADMIN — Medication 250 MG: at 09:42

## 2020-09-25 RX ADMIN — OXYCODONE HYDROCHLORIDE 5 MG: 5 TABLET ORAL at 06:19

## 2020-09-25 RX ADMIN — MIDODRINE HYDROCHLORIDE 10 MG: 5 TABLET ORAL at 12:44

## 2020-09-25 RX ADMIN — ENOXAPARIN SODIUM 40 MG: 100 INJECTION SUBCUTANEOUS at 09:43

## 2020-09-25 RX ADMIN — FERROUS SULFATE TAB 325 MG (65 MG ELEMENTAL FE) 325 MG: 325 (65 FE) TAB at 09:42

## 2020-09-25 RX ADMIN — ESCITALOPRAM OXALATE 10 MG: 10 TABLET ORAL at 09:43

## 2020-09-25 RX ADMIN — BUPROPION 300 MG: 150 TABLET, EXTENDED RELEASE ORAL at 09:42

## 2020-09-25 RX ADMIN — VITAMIN D, TAB 1000IU (100/BT) 1000 UNITS: 25 TAB at 09:42

## 2020-09-25 RX ADMIN — MEROPENEM 1000 MG: 1 INJECTION, POWDER, FOR SOLUTION INTRAVENOUS at 06:19

## 2020-09-25 RX ADMIN — Medication 125 MG: at 09:51

## 2020-09-25 NOTE — CASE MANAGEMENT
SW called the Pt over the phone for  IMM consent form  Pt gave verbal consent   Sw signed on Pt behalf  Original placed in chart for scanning

## 2020-09-25 NOTE — PLAN OF CARE
Problem: Potential for Falls  Goal: Patient will remain free of falls  Description: INTERVENTIONS:  - Assess patient frequently for physical needs  -  Identify cognitive and physical deficits and behaviors that affect risk of falls    -  [place] fall precautions as indicated by assessment   - Educate patient/family on patient safety including physical limitations  - Instruct patient to call for assistance with activity based on assessment  - Modify environment to reduce risk of injury  - Consider OT/PT consult to assist with strengthening/mobility  Outcome: Progressing     Problem: Prexisting or High Potential for Compromised Skin Integrity  Goal: Skin integrity is maintained or improved  Description: INTERVENTIONS:  - Identify patients at risk for skin breakdown  - Assess and monitor skin integrity  - Assess and monitor nutrition and hydration status  - Monitor labs   - Assess for incontinence   - Turn and reposition patient  - Assist with mobility/ambulation  - Relieve pressure over bony prominences  - Avoid friction and shearing  - Provide appropriate hygiene as needed including keeping skin clean and dry  - Evaluate need for skin moisturizer/barrier cream  - Collaborate with interdisciplinary team   - Patient/family teaching  - Consider wound care consult   Outcome: Progressing     Problem: INFECTION - ADULT  Goal: Absence or prevention of progression during hospitalization  Description: INTERVENTIONS:  - Assess and monitor for signs and symptoms of infection  - Monitor lab/diagnostic results  - Monitor all insertion sites, i e  indwelling lines, tubes, and drains  - Monitor endotracheal if appropriate and nasal secretions for changes in amount and color  - [place] appropriate cooling/warming therapies per order  - Administer medications as ordered  - Instruct and encourage patient and family to use good hand hygiene technique  - Identify and instruct in appropriate isolation precautions for identified infection/condition  Outcome: Progressing

## 2020-09-25 NOTE — CASE MANAGEMENT
SW informed by attending that pt will need one week of IV antibiotics  Pt is also recommending a STR  SW discussed with pt  SW recommendation with pt and Freedom of choice  Pt only preference was go to [facility]  Pt was concerned about her insurance coverage  SW sent out referral to [facility], who accepted for admission today  CMS is still expanding the SNF benefits due to covid 19 as per Liaison  Pt made aware BLS ambulance scheduled for today at 1700 with [facility]  Medical necessity form completed  Nursing provided with contact information to give report

## 2020-09-25 NOTE — PHYSICAL THERAPY NOTE
PHYSICAL THERAPY TREATMENT     NAME:  Jacek Grande  DATE: 09/25/20    AGE:   65 y o  Mrn:   [id number]  Length Of Stay: 3    ADMIT DX:  Leg pain [M79 606]  Leg swelling [M79 89]  Cellulitis of right leg [S64 850]  Left leg cellulitis [O09 982]    Past Medical History:   Diagnosis Date    Anemia     Anxiety     Chronic back pain     Hyperkalemia     Hypertension     Hypotension     Lymphedema     BRANDYN (obstructive sleep apnea)     Psychiatric disorder     depression     Past Surgical History:   Procedure Laterality Date    APPENDECTOMY      GASTRIC BYPASS      obesity         09/25/20 0838   PT Last Visit   PT Visit Date 09/25/20   Pain Assessment   Pain Assessment Tool 0-10   Pain Score Worst Possible Pain   Pain Location/Orientation Orientation: Bilateral;Orientation: Lower; Location: Leg;Location: Knee   Pain Onset/Description Onset: Ongoing   Effect of Pain on Daily Activities limits all aspects of mobility and mobility tolerance    Patient's Stated Pain Goal No pain   Hospital Pain Intervention(s) Medication (See MAR); Ambulation/increased activity;Repositioned; Emotional support   Restrictions/Precautions   Weight Bearing Precautions Per Order No   Other Precautions Chair Alarm; Bed Alarm; Fall Risk;Pain   General   Chart Reviewed Yes   Additional Pertinent History no significant change since previous session    Response to Previous Treatment Patient with no complaints from previous session  Family/Caregiver Present No   Cognition   Overall Cognitive Status WFL   Arousal/Participation Alert; Cooperative   Attention Attends with cues to redirect   Orientation Level Oriented X4   Memory Within functional limits   Following Commands Follows multistep commands with increased time or repetition   Comments Pt continues to be emotionally labile t/o session      Subjective   Subjective "I hate all of this anymore "    Bed Mobility   Supine to Sit 2  Maximal assistance   Additional items Assist x 1;HOB elevated; Bedrails; Increased time required;LE management;Verbal cues   Sit to Supine 2  Maximal assistance   Additional items Assist x 1; Increased time required;Verbal cues;LE management   Transfers   Sit to Stand 3  Moderate assistance   Additional items Assist x 1; Increased time required;Verbal cues  (bed height elevated )   Stand to Sit 4  Minimal assistance   Additional items Assist x 1;Verbal cues; Increased time required   Balance   Static Sitting Fair   Dynamic Sitting Fair   Static Standing Fair   Dynamic Standing Poor   Ambulatory Poor   Endurance Deficit   Endurance Deficit Yes   Activity Tolerance   Activity Tolerance Patient limited by fatigue;Patient limited by pain   Nurse Made Aware Yes, spoke with GEO Lino    Assessment   Prognosis Fair   Problem List Decreased strength;Decreased range of motion;Decreased endurance; Impaired balance;Decreased mobility; Decreased safety awareness; Impaired judgement;Decreased skin integrity;Pain   Assessment Pt seen for PT treatment today with focus on gait training  Pt presents semi-supine in bed, initially refuses to participate in session, states "I don't want to be bothered today  I don't even want them to come in a bath me or turn me for anything " After increased encouragement and education from therapist pt agreeable to ambulation only  Pt states she does not want to remain OOB in recliner chair at end of session due to the chair being "uncomfortable " Pt requires MAXA from therapist for supine>short sit EOB, w/ HOB elevated and heavy use of bed rails  Pt then requires MODA from therapist and elevated bed height to achieve standing at RW  Pt ambulates 26ft with RW and GALEN, no chair follow on this date  Pt continues with dec weight shifting, dec knee flexion t/o all gait phases, dec foot clearance during ambulation  [facility] from therapist required for return to semi-supine in bed and repositioning   At end of session pt was left as found with all needs in place and lines intact, care returned to RN  Continue to recommend STR upon d/c as pt is not safe for return to home at her current level of functional mobility  Will continue skilled PT POC as able to address continued impairments  Barriers to Discharge Decreased caregiver support   Goals   Patient Goals "to be healthy again"    STG Expiration Date 10/03/20   Short Term Goal #1 1  Pt will complete all bed mobility in hospital bed with [facility], in order to promote increased OOB functional mobility to improve overall activity tolerance  2  Pt will complete all transfers with RW and S, in order to increase safety with functional mobility  3  Pt will ambulate >50ft with RW and S in order to increase safety with household distance functional mobility  4  Pt will improve B LE strength to >/= 3+/5 MMT throughout, in order to increase safety with functional mobility and decrease risk of falls  5  Pt will improve Barthel Index score to >/= 45/100 in order to increase independence and decrease risk of falls  6  Pt will demonstrate understanding and independence with LE strengthening HEP  8  Pt will improve standing balance by >/= 1/2 grade in order to promote safety and increased independence with mobility  7  Pt will tolerate >3hrs OOB in recliner chair with good endurance evidenced by no significant change in vitals or behaviors  PT Treatment Day 3   Plan   Treatment/Interventions Functional transfer training;LE strengthening/ROM; Therapeutic exercise; Endurance training;Patient/family training;Bed mobility;Gait training;Spoke to nursing;Spoke to case management;OT   Progress Slow progress, decreased activity tolerance   PT Frequency 5x/wk   Recommendation   PT Discharge Recommendation Post-Acute Rehabilitation Services   Equipment Recommended Anjel Brush; Wheelchair  (pt owns both )   PT - OK to Discharge Yes  (to rehab )         Time in: 0800  Time out: 0838  Total treatment time: 38 minutes      Valentino Fordyce, PT, DPT   9/25/2020

## 2020-09-25 NOTE — COVID-19 HEALTH CARE FACILITY TRANSFER FORM
[facility] to [address] Transfer - COVID-19 Assessment             Name of Patient: Jacek Grande                : 1950          Transport Date: 20       Has the patient been laboratory tested for COVID-19? []  NO  If No,Test was not indicated per  CDC Testing Criteria   May Transfer Patient   [x] YES  If Tested Results below     COVID-19 References              SARS-CoV-2   Date/Time Value Ref Range Status   2020 10:47 AM Negative Negative Final   2020 06:11 PM Negative Negative Final            Question is to be completed for any patient who tests positive for COVID-19        1  [x] Yes [May Transfer] [] No [May Not Transfer]          Question is to be completed for any patient who is tested for COVID-19            2    [] Yes [May Not Transfer] [x] No [May Transfer]          Signature of Physician or Health Care Professional: Rimma Faustin MD 20          Form updated as of 3/24/2020

## 2020-09-25 NOTE — DISCHARGE INSTR - OTHER ORDERS
Please provide wound care as follows: Local wound care of legs with Xeroform, ABD pads, Kerlix, Safe-Clens and change dressings twice daily if possible    B/L lower extremity elevation advised

## 2020-09-25 NOTE — PLAN OF CARE
Problem: PHYSICAL THERAPY ADULT  Goal: Performs mobility at highest level of function for planned discharge setting  See evaluation for individualized goals  Description: Treatment/Interventions: Functional transfer training, LE strengthening/ROM, Therapeutic exercise, Endurance training, Patient/family training, Bed mobility, Gait training, Spoke to nursing, Spoke to case management, OT  Equipment Recommended: Sonja Carter owns both )       See flowsheet documentation for full assessment, interventions and recommendations  Outcome: Progressing  Note: Prognosis: Fair  Problem List: Decreased strength, Decreased range of motion, Decreased endurance, Impaired balance, Decreased mobility, Decreased safety awareness, Impaired judgement, Decreased skin integrity, Pain  Assessment: Pt seen for PT treatment today with focus on gait training  Pt presents semi-supine in bed, initially refuses to participate in session, states "I don't want to be bothered today  I don't even want them to come in a bath me or turn me for anything " After increased encouragement and education from therapist pt agreeable to ambulation only  Pt states she does not want to remain OOB in recliner chair at end of session due to the chair being "uncomfortable " Pt requires MAXA from therapist for supine>short sit EOB, w/ HOB elevated and heavy use of bed rails  Pt then requires MODA from therapist and elevated bed height to achieve standing at RW  Pt ambulates 26ft with RW and GALEN, no chair follow on this date  Pt continues with dec weight shifting, dec knee flexion t/o all gait phases, dec foot clearance during ambulation  Alex Larson from therapist required for return to semi-supine in bed and repositioning  At end of session pt was left as found with all needs in place and lines intact, care returned to RN  Continue to recommend STR upon d/c as pt is not safe for return to home at her current level of functional mobility   Will continue skilled PT POC as able to address continued impairments  Barriers to Discharge: Decreased caregiver support     PT Discharge Recommendation: Post-Acute Rehabilitation Services     PT - OK to Discharge: Yes(to rehab )    See flowsheet documentation for full assessment

## 2020-09-25 NOTE — DISCHARGE INSTR - AVS FIRST PAGE
Please continue antibiotic course on discharge with IV cefepime for 6 days, in addition to oral vancomycin for total of 9 days  Please follow-up with Dr Ernesto Clarke of infectious diseases for further management of your cellulitis  Please continue to provide wound care as instructed (see wound care discharge instructions)

## 2020-09-25 NOTE — TELEPHONE ENCOUNTER
[phone] / Maude Jiménez / [employer] / calling with admission for pt  Codi Rosales / [phone] 1 [phone]    [place] texted Dr Jaylene Salomon

## 2020-09-26 NOTE — ASSESSMENT & PLAN NOTE
Wounds banadaged and clean  - Infectious disease consult appreciated  - Will step down abx to cefipime for 6 days duration, post discharge  - Continue vancomycin p o  For C diff prophylaxis for 9 days      post-discharge  - History of MDRO wound cultures  Microscopic stain showed no      PMNs, +1 gram negative rods and rare    Gram-positive rods  - Follow-up wound culture  - Provide wound care   Xeroform, ABD pads, Kerlix, - Safe-Clens and change dressings twice daily if possible

## 2020-09-26 NOTE — DISCHARGE SUMMARY
Discharge- Donald Gooden 1950, 65 y o  female MRN: [id number]    Unit/Bed#: -01 Encounter: [id number]    Primary Care Provider: Jelani Lang MD   Date and time admitted to hospital: 9/22/2020  2:59 PM        * Recurrent cellulitis of lower extremity  Assessment & Plan  Wounds banadaged and clean  - Infectious disease consult appreciated  - Will step down abx to cefipime for 6 days duration, post discharge  - Continue vancomycin p o  For C diff prophylaxis for 9 days      post-discharge  - History of MDRO wound cultures  Microscopic stain showed no      PMNs, +1 gram negative rods and rare    Gram-positive rods  - Follow-up wound culture  - Provide wound care  Xeroform, ABD pads, Kerlix, - Safe-Clens and change dressings twice daily if possible      Anxiety  Assessment & Plan  Continue antidepressant and antianxiety medications  Idiopathic hypotension  Assessment & Plan  BP today of 93/54 around time of assessment    - Continue to monitor hemodynamics  - Continue midodrine  Hold for SBP greater than 130 mmHg  Depression  Assessment & Plan  Continue antidepressant and antianxiety medications  PCP: Jelani Lang MD  Admission Date: 9/22/2020  Discharge Date: 09/25/20    Disposition:     [address] at [address]  Reason for Admission:  Recurrent cellulitis  Consultations During Hospital Stay:  · Infectious disease consult appreciated  Procedures Performed:     · None  Primary diagnosis:    Recurrent cellulitis  Secondary diagnosis:   None  Significant Findings / Test Results:     · None  Incidental Findings:   · None  Test Results Pending at Discharge (will require follow up): · None  Outpatient Tests Requested:  · None  Complications:  None      Hospital Course:     Donald Gooden is a 65 y o  female patient who originally presented to the hospital on 9/22/2020 due to recurrent cellulitis of bilateral lower extremities  Patient had a most recent hospitalization in July 2020 2020; patient underwent inpatient IV antibiotic treatment, and was discharged to skilled nurse facility with continue therapy with IV cefepime for a ten-day course  Upon return to home, patient was continued on and p o  Cefpodoxime, and provided wound care by visiting nurse aide however patient still he displayed persistent cellulitis without purulent drainage on assessment by infectious disease specialist Dr Nessa Espinal  Patient has a history of multi-drug resistant organism growth on cultures, with a previous wound culture yielding cefepime sensitive Pseudomonas, ESBL positive Klebsiella and Proteus  Patient presented with persistent cellulitis bilaterally in a circumferential distribution, extending from her digits to about 3 inches below her knee  Patient was subsequently admitted to the internal medicine services for further management of extensive bilateral cellulitis  Wound cultures were sought, while the patient was provided with IV antimicrobial therapy with meropenem, in addition to po vancomycin for emperic coverage of clostridium difficile colitis, given history of metronidazole failure  Additionally, routine wound care was provided  Patient remained afebrile, with improvements in wounds noted with these interventions  Wound cultures grew psuedomonas and proteus and patient was subsequently placed on iv cefipime prior to discharge  Patient was discharged to [address] for continual antimicrobial management and rehabilitative services  Condition at Discharge: good     Discharge Day Visit / Exam:     Subjective:  Patient interviewed at the bedside on the day of discharge  Patient denies any lower extremity pain or discomfort  A review of systems was unremarkable      Vitals: Blood Pressure: 92/56 (09/25/20 0725)  Pulse: 96 (09/25/20 0725)  Temperature: 98 °F (36 7 °C) (09/25/20 0725)  Temp Source: Tympanic (09/25/20 0725)  Respirations: 18 (09/25/20 0725)  Height: 4' 11" (149 9 cm) (09/23/20 1323)  Weight - Scale: 65 kg (143 lb 4 8 oz) (09/23/20 1323)  SpO2: 97 % (09/25/20 0725)   Exam:   Physical Exam  Vitals signs reviewed  Constitutional:       Appearance: Normal appearance  HENT:      Head: Normocephalic and atraumatic  Mouth/Throat:      Mouth: Mucous membranes are moist    Eyes:      Extraocular Movements: Extraocular movements intact  Cardiovascular:      Rate and Rhythm: Normal rate and regular rhythm  Heart sounds: S1 normal and S2 normal    Pulmonary:      Effort: Pulmonary effort is normal       Breath sounds: Normal breath sounds  Abdominal:      Palpations: Abdomen is soft  Musculoskeletal: Normal range of motion  Skin:     Comments: Bilateral lower extremities with bandages in place, clean, no show through blood or exudate noted  Neurological:      Mental Status: She is alert and oriented to person, place, and time  Psychiatric:         Mood and Affect: Mood normal          Behavior: Behavior normal        Discussion with Family: None  Discharge instructions/Information to patient and family:   See after visit summary for information provided to patient and family  Provisions for Follow-Up Care:  See after visit summary for information related to follow-up care and any pertinent home health orders  Planned Readmission: None  Discharge Medications:  See after visit summary for reconciled discharge medications provided to patient and family        ** Please Note: This note has been constructed using a voice recognition system **

## 2020-09-28 ENCOUNTER — NURSING HOME VISIT (OUTPATIENT)
Dept: GERIATRICS | Facility: OTHER | Age: 70
End: 2020-09-28
Payer: MEDICARE

## 2020-09-28 DIAGNOSIS — E55.9 VITAMIN D DEFICIENCY: ICD-10-CM

## 2020-09-28 DIAGNOSIS — L97.929 CHRONIC VENOUS HYPERTENSION (IDIOPATHIC) WITH ULCER OF BILATERAL LOWER EXTREMITY (HCC): ICD-10-CM

## 2020-09-28 DIAGNOSIS — R26.2 AMBULATORY DYSFUNCTION: ICD-10-CM

## 2020-09-28 DIAGNOSIS — I95.0 IDIOPATHIC HYPOTENSION: ICD-10-CM

## 2020-09-28 DIAGNOSIS — D50.9 IRON DEFICIENCY ANEMIA, UNSPECIFIED IRON DEFICIENCY ANEMIA TYPE: ICD-10-CM

## 2020-09-28 DIAGNOSIS — L97.919 CHRONIC VENOUS HYPERTENSION (IDIOPATHIC) WITH ULCER OF BILATERAL LOWER EXTREMITY (HCC): ICD-10-CM

## 2020-09-28 DIAGNOSIS — L03.119 RECURRENT CELLULITIS OF LOWER EXTREMITY: Primary | ICD-10-CM

## 2020-09-28 DIAGNOSIS — A04.72 C. DIFFICILE COLITIS: ICD-10-CM

## 2020-09-28 DIAGNOSIS — I87.313 CHRONIC VENOUS HYPERTENSION (IDIOPATHIC) WITH ULCER OF BILATERAL LOWER EXTREMITY (HCC): ICD-10-CM

## 2020-09-28 DIAGNOSIS — I35.0 SEVERE AORTIC STENOSIS: ICD-10-CM

## 2020-09-28 DIAGNOSIS — F41.8 DEPRESSION WITH ANXIETY: ICD-10-CM

## 2020-09-28 DIAGNOSIS — I89.0 LYMPHEDEMA: ICD-10-CM

## 2020-09-28 PROBLEM — N17.9 ACUTE KIDNEY INJURY (HCC): Status: RESOLVED | Noted: 2020-07-26 | Resolved: 2020-09-28

## 2020-09-28 LAB
BACTERIA BLD CULT: NORMAL
BACTERIA BLD CULT: NORMAL

## 2020-09-28 PROCEDURE — 99306 1ST NF CARE HIGH MDM 50: CPT | Performed by: FAMILY MEDICINE

## 2020-09-28 NOTE — ASSESSMENT & PLAN NOTE
· Patient with recent history of C  Diff, per chart, previously had failed Metronidazole  · Not currently experiencing diarrhea  · Patient has remained afebrile  · Will monitor for diarrhea, will check CBC to monitor WBC, BMP to monitor renal function

## 2020-09-28 NOTE — ASSESSMENT & PLAN NOTE
· Continue ferrous sulfate  · Patient with low hemoglobin at 9 4 which is improved from before  · Low MCV, likely iron deficiency  · Recommend iron studies as outpatient, follow up with PCP  · Check CBC to monitor

## 2020-09-28 NOTE — ASSESSMENT & PLAN NOTE
· BP low at 95/57  · She has hx of idiopathic hypotension on midodrine  · Continue midodrine 10 mg po TID and hold for SBP > 130

## 2020-09-28 NOTE — ASSESSMENT & PLAN NOTE
· Patient with hx of severe AS  · Aortic Valve area 0 7  · Follow up with Cardiology  · Continue volume management

## 2020-09-28 NOTE — ASSESSMENT & PLAN NOTE
· Improving  · patient previously with history of multidrug resistant wound cultures also with multiple antibiotic allergies  · Had tolerated Cefepime during previous admissions  · Followed by ID in the hospital, she was initially treted with IV meropenem, once Wound cultures and sensitivities were available, patient was switched to Cefepime  · Discharged from the hospital with instructions to continue IV Cefepime x 6 more days and PO Vancomycin x 9 more days for prophylaxis of C  diff  · Continue local wound care with Xeroform, ABD pads, Kerlix  Safe-clens and change dressings BID  · Follow up with ID  · Check CBC to monitor, monitor for hemodynamic instability

## 2020-09-28 NOTE — ASSESSMENT & PLAN NOTE
· With chronic wounds on bilateral lower extremities  · Continue to monitor  · Continue local wound care, leg elevation

## 2020-09-28 NOTE — ASSESSMENT & PLAN NOTE
· On multidrug regimen  · Continue current management with buproprion, lexapro, cyclobenzaprine  · Continue frequent redirection/reorientation as needed

## 2020-09-28 NOTE — PROGRESS NOTES
[address]  [phone]    [place]  HISTORY & PHYSICAL        NAME: Harrison Buitrago  AGE: 65 y o  SEX: female  : 1950  DATE OF ENCOUNTER: 20  POS: 31 (SNF)  PCP: Ellie Garcia MD    Chief Complaint     Seen and examined today for admission to short term rehabilitation unit at [facility]  History of Present Illness     64 yo gentleman with underlying ambulatory dysfunction, venous insufficiency with recurrent cellulitis of lower extremiies since , deepression with anxiety, vitamin D and vitamin B12 deficiency, lymphedema, chronic back pain, severe aortic stenosis, idiopathic hypotension, hx of CDiff colitis, admitted to [facility] -20 due to recurrent cellulitis of bilateral lower extremities  Patient presented with persistent cellulitis of bilateral lower extremities  Of note, patient was previously discharged from the hospital for same complain in 2020  Treated with Cefpodoxime and on follow up visit with Dr Elyse Rodriguez of ID, she was noted to have recurrent cellulitis of bilateral lower extremities  She remained hemodynamically stable and afebrile, Dr Elyse Rodriguez recommended direct admission to the hospital for further management of her cellulitis  She was evaluated by ID in the hospital, a previous wound culture had grown cefepime sensitive pseudomonas, ESBP positive Klebsiella and proteus  Patient was admitted to the hospital for monitoring of her cellulitis  Initially treated with IV meropenem and vancomycin PO was added for C diff prophylaxys  Patient's antibiotic therapy switched to IV Cefepime once culture sensitivities were available  During hospital stay, she remained afebrile and wound had clinically improved  Once considered medically stable, she was discharged to [facility] for subacute rehab services     She was discharged with instructions to continue IV Cefepime for 6 more days and PO Vancomycin x 9 more days  Local wound care instructions were provided  Today, patient was seen and examined for admission to [facility] rehab unit at [facility]  Review of Systems     Review of Systems   Constitutional: Positive for fatigue  Negative for activity change, appetite change, fever and unexpected weight change  HENT: Negative for congestion, dental problem, hearing loss and trouble swallowing  Eyes: Negative for visual disturbance  Respiratory: Negative for apnea, cough, choking, chest tightness and shortness of breath  Cardiovascular: Negative for chest pain, palpitations and leg swelling  Gastrointestinal: Negative for abdominal distention, abdominal pain, constipation, diarrhea, nausea and vomiting  Genitourinary: Negative for difficulty urinating, dysuria and flank pain  Musculoskeletal: Positive for arthralgias (bilateral chronic knee pain ) and gait problem  Skin: Negative for rash and wound  Psychiatric/Behavioral: Negative for agitation, behavioral problems, confusion and sleep disturbance  All other systems reviewed and are negative  History     Allergies   Allergen Reactions    Aspirin     Cephalosporins      Has tolerated cefepime    Penicillins Other (See Comments)     Unknown reaction during childhood      Valium [Diazepam]     Ciprofloxacin Rash    Sulfa Antibiotics Rash     Itching, rash    Vancomycin Rash       Past Medical History:   Diagnosis Date    Anemia     Anxiety     Chronic back pain     Hyperkalemia     Hypertension     Hypotension     Lymphedema     BRANDYN (obstructive sleep apnea)     Psychiatric disorder     depression     Past Surgical History:   Procedure Laterality Date    APPENDECTOMY      GASTRIC BYPASS      obesity        Family History: non-contributory  Social History     Tobacco Use    Smoking status: Never Smoker    Smokeless tobacco: Never Used    Tobacco comment: Never smoker - As per [place]    Substance Use Topics  Alcohol use: Not Currently     Comment: Alcohol intake:   None - As per [place] Drug use: Never     Comment: Illicit drugs:   no - As per [place]          She lives alone in a house with first floor setting  Has a ramp at the entrance for wheel chair  Patient at baseline ambulates with wheelchair  Her sister helps with household chores  She requires assistance with ADLs  Objective   Vital Signs   BP: 95/57    HR:74    T:97 6    RR:16    O2Sat:98% RA     W:143 5 lb    Physical Exam  Vitals signs reviewed  Constitutional:       General: She is not in acute distress  Appearance: She is well-developed  She is not diaphoretic  HENT:      Head: Normocephalic and atraumatic  Right Ear: External ear normal       Left Ear: External ear normal       Mouth/Throat:      Mouth: Mucous membranes are moist       Pharynx: Oropharyngeal exudate present  Eyes:      Extraocular Movements: Extraocular movements intact  Conjunctiva/sclera: Conjunctivae normal       Pupils: Pupils are equal, round, and reactive to light  Neck:      Vascular: No JVD  Trachea: No tracheal deviation  Cardiovascular:      Rate and Rhythm: Normal rate and regular rhythm  Heart sounds: Normal heart sounds  No murmur  Pulmonary:      Effort: Pulmonary effort is normal  No respiratory distress  Breath sounds: Normal breath sounds  Abdominal:      General: Bowel sounds are normal  There is no distension  Palpations: Abdomen is soft  Tenderness: There is no abdominal tenderness  Musculoskeletal:         General: No tenderness or deformity  Skin:     General: Skin is warm and dry  Coloration: Skin is pale  Findings: No erythema or rash  Neurological:      General: No focal deficit present  Mental Status: She is alert and oriented to person, place, and time  Cranial Nerves: No cranial nerve deficit     Psychiatric:         Mood and Affect: Mood normal          Behavior: Behavior normal          Thought Content: Thought content normal          Judgment: Judgment normal            Pertinent Laboratory/Diagnostic Studies:     Lab Results   Component Value Date    WBC 8 16 09/25/2020    HGB 9 4 (L) 09/25/2020    HCT 30 0 (L) 09/25/2020    MCV 90 09/25/2020     09/25/2020     Lab Results   Component Value Date    CALCIUM 8 6 09/25/2020    K 3 8 09/25/2020    CO2 23 09/25/2020     09/25/2020    BUN 22 (H) 09/25/2020    CREATININE 0 75 09/25/2020     No results found for: [id number], TSH  No results found for: HGBA1C      Current Medications     All medications reviewed and updated in  EMR/Chart  Assessment and Plan     Recurrent cellulitis of lower extremity  · Improving  · patient previously with history of multidrug resistant wound cultures also with multiple antibiotic allergies  · Had tolerated Cefepime during previous admissions  · Followed by ID in the hospital, she was initially treted with IV meropenem, once Wound cultures and sensitivities were available, patient was switched to Cefepime  · Discharged from the hospital with instructions to continue IV Cefepime x 6 more days and PO Vancomycin x 9 more days for prophylaxis of C  diff  · Continue local wound care with Xeroform, ABD pads, Kerlix  Safe-clens and change dressings BID  · Follow up with ID  · Check CBC to monitor, monitor for hemodynamic instability  C  difficile colitis  · Patient with recent history of C  Diff, per chart, previously had failed Metronidazole  · Not currently experiencing diarrhea  · Patient has remained afebrile  · Will monitor for diarrhea, will check CBC to monitor WBC, BMP to monitor renal function  Idiopathic hypotension  · BP low at 95/57  · She has hx of idiopathic hypotension on midodrine  · Continue midodrine 10 mg po TID and hold for SBP > 130       Chronic venous hypertension (idiopathic) with ulcer of bilateral lower extremity (HCC)  · With chronic wounds on bilateral lower extremities  · Continue to monitor  · Continue local wound care, leg elevation  Severe aortic stenosis  · Patient with hx of severe AS  · Aortic Valve area 0 7  · Follow up with Cardiology  · Continue volume management  Depression with anxiety  · On multidrug regimen  · Continue current management with buproprion, lexapro, cyclobenzaprine  · Continue frequent redirection/reorientation as needed  Anemia  · Continue ferrous sulfate  · Patient with low hemoglobin at 9 4 which is improved from before  · Low MCV, likely iron deficiency  · Recommend iron studies as outpatient, follow up with PCP  · Check CBC to monitor  Vitamin D deficiency  · Continue Vitamin D supplements  Lymphedema  · Not on lasix, unable to tolerated due to underlying hypotension  · Continue to monitor  · Elevate legs  Ambulatory dysfunction  · PT/OT to evaluate and treat as appropriate  · Fall precautions are recommended  Discussed with patient at bedside  She wishes to remain a full code  Glenis Novoa MD  Geriatric Medicine  09/28/20    Please note:  Voice-recognition software may have been used in the preparation of this document  Occasional wrong word or "sound-alike" substitutions may have occurred due to the inherent limitations of voice recognition software  Interpretation should be guided by context

## 2020-09-29 ENCOUNTER — NURSING HOME VISIT (OUTPATIENT)
Dept: GERIATRICS | Facility: OTHER | Age: 70
End: 2020-09-29
Payer: MEDICARE

## 2020-09-29 VITALS
SYSTOLIC BLOOD PRESSURE: 132 MMHG | TEMPERATURE: 97.6 F | BODY MASS INDEX: 28.98 KG/M2 | RESPIRATION RATE: 19 BRPM | WEIGHT: 143.5 LBS | OXYGEN SATURATION: 96 % | HEART RATE: 74 BPM | DIASTOLIC BLOOD PRESSURE: 68 MMHG

## 2020-09-29 DIAGNOSIS — F41.8 DEPRESSION WITH ANXIETY: ICD-10-CM

## 2020-09-29 DIAGNOSIS — E55.9 VITAMIN D DEFICIENCY: ICD-10-CM

## 2020-09-29 DIAGNOSIS — L03.119 RECURRENT CELLULITIS OF LOWER EXTREMITY: ICD-10-CM

## 2020-09-29 DIAGNOSIS — I95.0 IDIOPATHIC HYPOTENSION: ICD-10-CM

## 2020-09-29 DIAGNOSIS — D50.9 IRON DEFICIENCY ANEMIA, UNSPECIFIED IRON DEFICIENCY ANEMIA TYPE: ICD-10-CM

## 2020-09-29 DIAGNOSIS — L29.9 PRURITUS: Primary | ICD-10-CM

## 2020-09-29 PROCEDURE — 99309 SBSQ NF CARE MODERATE MDM 30: CPT | Performed by: PHYSICIAN ASSISTANT

## 2020-09-29 RX ORDER — HYDROCODONE BITARTRATE AND ACETAMINOPHEN 5; 325 MG/1; MG/1
1 TABLET ORAL EVERY 6 HOURS PRN
COMMUNITY
End: 2020-10-02 | Stop reason: ALTCHOICE

## 2020-09-29 RX ORDER — DIPHENHYDRAMINE HCL 25 MG
25 TABLET ORAL EVERY 8 HOURS PRN
COMMUNITY
End: 2022-03-01

## 2020-09-29 NOTE — ASSESSMENT & PLAN NOTE
- Continue IV cefepime 1g BID x 5 days  - Continue vancomycin x 8 days for C   Diff prophylaxis   - PM&R added prn norco for pain   - continue local wound care with Xeroform, abdominal pads, kerlix, safe-clens and change BID   - CBC and BMP ordered for 10/5/2020   - Continue to follow with ID

## 2020-09-29 NOTE — ASSESSMENT & PLAN NOTE
- Continue wellbutrin 300mg PO QD  - Continue lexapro 10 mg PO QD  - Continue buspar 15 mg PO BID   - Denies SI/HI   - Notes increased anxiety at facility because "things aren't getting done when I want them to be  I have to call for everything and fight for everything   And they won't give me my lorazepam "  - No ativan ordered at this time   - Med Options consulted

## 2020-09-29 NOTE — PROGRESS NOTES
[facility]  [address]  [phone]  [place]   Code 31        NAME: Lit Murillo  AGE: 65 y o  SEX: female    DATE OF ENCOUNTER: 9/29/2020     CODE status: full code     Assessment and Plan     Idiopathic hypotension  - BP stable today   - continue midodrine 10 mg PO TID, hold for SBP >/= 130     Anemia  - CBC ordered for 10/5/2020   - Monitor Hgb   - Continue iron supplementation     Depression with anxiety  - Continue wellbutrin 300mg PO QD  - Continue lexapro 10 mg PO QD  - Continue buspar 15 mg PO BID   - Denies SI/HI   - Notes increased anxiety at facility because "things aren't getting done when I want them to be  I have to call for everything and fight for everything  And they won't give me my lorazepam "  - No ativan ordered at this time   - Med Options consulted     Recurrent cellulitis of lower extremity  - Continue IV cefepime 1g BID x 5 days  - Continue vancomycin x 8 days for C  Diff prophylaxis   - PM&R added prn norco for pain   - continue local wound care with Xeroform, abdominal pads, kerlix, safe-clens and change BID   - CBC and BMP ordered for 10/5/2020   - Continue to follow with ID     Vitamin D deficiency  - Continue Vitamin D supplementation     Plan discussed with Dr Stephanie Earl noted agreement with assessment and plan  All medications and routine orders were reviewed and updated as needed  Chief Complaint     SNF follow-up, request to evaluate for pt request for benadryl added to medication regimen   History of Present Illness     RF is a 66 yo CF with multiple medical comorbidities including but not limited to recurrent cellulitis, idiopathic hypotension, depression with anxiety, and vitamin D deficiency, interviewed and examined in collaboration with nursing for SNF follow-up and for pruritis x 1 day  Pt complains of pruritis in her b/l feet and back starting overnight   She states this occurred the previous time she was at the facility as well  Pt notes it happens periodically at home as well and her PCP recommends taking OTC benadryl prn as directed on box instructions  Pt denies adverse reactions to benadryl in the past  She became angry and started screaming, "just give it to me  I can buy it in the pharmacy so just do it already  I am sick of having to fight for everything in this place " She notes b/l knee pain, which is chronic for her  PM&R evaluated on 9/28/2020 and added prn norco for pain  Pt notes pain lessens with prn norco  She notes eating at her normal level, per facility chart completing 50%-75% of each meal  She denies GI and urinary issues  She is incontinent of urine  Her last BM was today  Nursing notes pt is very angry with staff and verbally aggressive  Otherwise, nursing without concerns at this time  The patient's allergies, past medical, surgical, social and family history were reviewed and unchanged  Review of Systems     Review of Systems   Constitutional: Positive for activity change  Negative for appetite change  Respiratory: Negative for cough and shortness of breath  Cardiovascular: Positive for leg swelling  Negative for chest pain and palpitations  Gastrointestinal: Negative for abdominal distention  Genitourinary: Negative for difficulty urinating  Musculoskeletal: Positive for arthralgias and gait problem  Skin: Positive for color change         (+) pruritis b/l feet, back    Neurological: Positive for weakness  Negative for dizziness and light-headedness  Psychiatric/Behavioral: Positive for agitation and dysphoric mood  Negative for suicidal ideas  The patient is nervous/anxious  Objective     Vitals:   Vitals:    09/29/20 1536   BP: 132/68   Pulse: 74   Resp: 19   Temp: 97 6 °F (36 4 °C)   SpO2: 96%         Physical Exam  Vitals signs and nursing note reviewed  Constitutional:       General: She is not in acute distress       Comments: Chronically ill appearing elderly female lying comfortably in bed    HENT:      Nose: Nose normal  No congestion or rhinorrhea  Mouth/Throat:      Mouth: Mucous membranes are moist       Pharynx: Oropharynx is clear  No oropharyngeal exudate or posterior oropharyngeal erythema  Eyes:      General: No scleral icterus  Right eye: No discharge  Left eye: No discharge  Conjunctiva/sclera: Conjunctivae normal       Comments: Wearing glasses   Cardiovascular:      Rate and Rhythm: Normal rate and regular rhythm  Heart sounds: Murmur present  No friction rub  No gallop  Pulmonary:      Effort: Pulmonary effort is normal  No respiratory distress  Breath sounds: No stridor  No wheezing or rhonchi  Abdominal:      General: Bowel sounds are normal  There is no distension  Tenderness: There is no abdominal tenderness  There is no guarding or rebound  Musculoskeletal:      Right lower leg: Edema present  Left lower leg: Edema present  Skin:     Coloration: Skin is pale  Comments: BLE wrapped in dressing, b/l feet erythematous    Neurological:      Mental Status: She is alert and oriented to person, place, and time  Psychiatric:         Attention and Perception: Attention normal          Mood and Affect: Mood is anxious and depressed  Affect is angry  Speech: Speech normal          Behavior: Behavior is agitated (when talking about concerns about facility , verbally) and aggressive (when talking about complaints at facility, only verbally )  Behavior is cooperative  Thought Content: Thought content does not include homicidal or suicidal ideation  Thought content does not include homicidal or suicidal plan  Pertinent Laboratory/Diagnostic Studies:  Reviewed in facility chart     Current Medications   Medications reviewed and updated see facility [facility] for details        Current Outpatient Medications:     diphenhydrAMINE (BENADRYL) 25 mg tablet, Take 25 mg by mouth every 8 (eight) hours as needed, Disp: , Rfl:     HYDROcodone-acetaminophen (NORCO) 5-325 mg per tablet, Take 1 tablet by mouth every 6 (six) hours as needed, Disp: , Rfl:     acetaminophen (TYLENOL) 325 mg tablet, Take 2 tablets (650 mg total) by mouth every 6 (six) hours as needed for mild pain or moderate pain, Disp: 15 tablet, Rfl: 0    buPROPion (WELLBUTRIN XL) 300 mg 24 hr tablet, Take 1 tablet (300 mg total) by mouth daily, Disp: 30 tablet, Rfl: 0    busPIRone (BUSPAR) 15 mg tablet, Take 1 tablet (15 mg total) by mouth 2 (two) times a day, Disp: 60 tablet, Rfl: 0    cefepime (MAXIPIME) 1000 mg IVPB, Infuse 50 mL (1,000 mg total) into a venous catheter every 12 (twelve) hours for 12 doses, Disp: 600 mL, Rfl: 0    cholecalciferol (VITAMIN D3) 1,000 units tablet, Take 1,000 Units by mouth daily , Disp: , Rfl:     Cyanocobalamin (B-12 COMPLIANCE INJECTION IJ), Inject as directed, Disp: , Rfl:     cyclobenzaprine (FLEXERIL) 5 mg tablet, Take 1 tablet (5 mg total) by mouth 2 (two) times a day as needed for muscle spasms, Disp: 10 tablet, Rfl: 0    escitalopram (LEXAPRO) 10 mg tablet, Take 1 tablet (10 mg total) by mouth daily, Disp: 30 tablet, Rfl: 0    ferrous sulfate 325 (65 Fe) mg tablet, Take 2 tablets (650 mg total) by mouth daily with breakfast, Disp: 60 tablet, Rfl: 0    lidocaine (LIDODERM) 5 %, Apply 1 patch topically daily Remove & Discard patch within 12 hours or as directed by MD (Patient not taking: Reported on 9/22/2020), Disp: 5 patch, Rfl: 2    midodrine (PROAMATINE) 10 MG tablet, Take 1 tablet PO TID, hold for SBP >/= 130, Disp: 90 tablet, Rfl: 0    multivitamin (THERAGRAN) TABS, Take 1 tablet by mouth daily, Disp: , Rfl:     Probiotic Product (PROBIOTIC PO), Take 2 capsules by mouth daily, Disp: , Rfl:     vancomycin (VANCOCIN) 125 MG capsule, Take 1 capsule (125 mg total) by mouth 2 (two) times a day for 9 days, Disp: 18 capsule, Rfl: 0      Gwendolyn Quarles PA-C  9/29/2020 3:50 PM

## 2020-10-01 ENCOUNTER — NURSING HOME VISIT (OUTPATIENT)
Dept: GERIATRICS | Facility: OTHER | Age: 70
End: 2020-10-01
Payer: MEDICARE

## 2020-10-01 VITALS
BODY MASS INDEX: 28.98 KG/M2 | SYSTOLIC BLOOD PRESSURE: 102 MMHG | HEART RATE: 74 BPM | DIASTOLIC BLOOD PRESSURE: 67 MMHG | RESPIRATION RATE: 19 BRPM | OXYGEN SATURATION: 96 % | WEIGHT: 143.5 LBS | TEMPERATURE: 97.1 F

## 2020-10-01 DIAGNOSIS — L03.119 RECURRENT CELLULITIS OF LOWER EXTREMITY: Primary | ICD-10-CM

## 2020-10-01 DIAGNOSIS — F41.8 DEPRESSION WITH ANXIETY: ICD-10-CM

## 2020-10-01 DIAGNOSIS — I95.0 IDIOPATHIC HYPOTENSION: ICD-10-CM

## 2020-10-01 DIAGNOSIS — D50.9 IRON DEFICIENCY ANEMIA, UNSPECIFIED IRON DEFICIENCY ANEMIA TYPE: ICD-10-CM

## 2020-10-01 PROCEDURE — 99309 SBSQ NF CARE MODERATE MDM 30: CPT | Performed by: PHYSICIAN ASSISTANT

## 2020-10-02 ENCOUNTER — NURSING HOME VISIT (OUTPATIENT)
Dept: GERIATRICS | Facility: OTHER | Age: 70
End: 2020-10-02
Payer: MEDICARE

## 2020-10-02 VITALS
TEMPERATURE: 97.5 F | HEART RATE: 74 BPM | RESPIRATION RATE: 18 BRPM | SYSTOLIC BLOOD PRESSURE: 94 MMHG | OXYGEN SATURATION: 96 % | BODY MASS INDEX: 28.98 KG/M2 | DIASTOLIC BLOOD PRESSURE: 57 MMHG | WEIGHT: 143.5 LBS

## 2020-10-02 DIAGNOSIS — I95.0 IDIOPATHIC HYPOTENSION: Primary | ICD-10-CM

## 2020-10-02 DIAGNOSIS — L03.119 RECURRENT CELLULITIS OF LOWER EXTREMITY: ICD-10-CM

## 2020-10-02 DIAGNOSIS — F41.9 ANXIETY: ICD-10-CM

## 2020-10-02 DIAGNOSIS — D50.9 IRON DEFICIENCY ANEMIA, UNSPECIFIED IRON DEFICIENCY ANEMIA TYPE: ICD-10-CM

## 2020-10-02 DIAGNOSIS — F41.8 DEPRESSION WITH ANXIETY: ICD-10-CM

## 2020-10-02 DIAGNOSIS — F32.A DEPRESSION, UNSPECIFIED DEPRESSION TYPE: ICD-10-CM

## 2020-10-02 DIAGNOSIS — I35.0 SEVERE AORTIC STENOSIS: ICD-10-CM

## 2020-10-02 PROCEDURE — 99316 NF DSCHRG MGMT 30 MIN+: CPT | Performed by: PHYSICIAN ASSISTANT

## 2020-10-02 RX ORDER — BUPROPION HYDROCHLORIDE 300 MG/1
300 TABLET ORAL DAILY
Qty: 30 TABLET | Refills: 0 | Status: SHIPPED | OUTPATIENT
Start: 2020-10-02

## 2020-10-02 RX ORDER — ESCITALOPRAM OXALATE 10 MG/1
10 TABLET ORAL DAILY
Qty: 30 TABLET | Refills: 0 | Status: SHIPPED | OUTPATIENT
Start: 2020-10-02

## 2020-10-02 RX ORDER — FERROUS SULFATE 325(65) MG
650 TABLET ORAL
Qty: 60 TABLET | Refills: 0 | Status: SHIPPED | OUTPATIENT
Start: 2020-10-02

## 2020-10-02 RX ORDER — MIDODRINE HYDROCHLORIDE 10 MG/1
TABLET ORAL
Qty: 90 TABLET | Refills: 0 | Status: SHIPPED | OUTPATIENT
Start: 2020-10-02

## 2020-10-02 RX ORDER — BUSPIRONE HYDROCHLORIDE 15 MG/1
15 TABLET ORAL 2 TIMES DAILY
Qty: 60 TABLET | Refills: 0 | Status: SHIPPED | OUTPATIENT
Start: 2020-10-02

## 2020-10-04 ENCOUNTER — TELEPHONE (OUTPATIENT)
Dept: OTHER | Facility: OTHER | Age: 70
End: 2020-10-04

## 2020-12-14 DIAGNOSIS — F32.A DEPRESSION, UNSPECIFIED DEPRESSION TYPE: ICD-10-CM

## 2020-12-14 RX ORDER — BUPROPION HYDROCHLORIDE 300 MG/1
TABLET ORAL
Qty: 30 TABLET | Refills: 0 | OUTPATIENT
Start: 2020-12-14

## 2021-01-20 DIAGNOSIS — F32.A DEPRESSION, UNSPECIFIED DEPRESSION TYPE: ICD-10-CM

## 2021-01-20 DIAGNOSIS — F41.9 ANXIETY: ICD-10-CM

## 2021-01-20 RX ORDER — BUSPIRONE HYDROCHLORIDE 15 MG/1
TABLET ORAL
Qty: 60 TABLET | Refills: 0 | OUTPATIENT
Start: 2021-01-20

## 2021-05-13 DIAGNOSIS — F41.9 ANXIETY: ICD-10-CM

## 2021-05-13 DIAGNOSIS — F32.A DEPRESSION, UNSPECIFIED DEPRESSION TYPE: ICD-10-CM

## 2021-05-13 RX ORDER — BUPROPION HYDROCHLORIDE 300 MG/1
TABLET ORAL
Qty: 30 TABLET | Refills: 0 | OUTPATIENT
Start: 2021-05-13

## 2021-05-13 RX ORDER — BUSPIRONE HYDROCHLORIDE 15 MG/1
TABLET ORAL
Qty: 60 TABLET | Refills: 0 | OUTPATIENT
Start: 2021-05-13

## 2021-06-03 DIAGNOSIS — F41.9 ANXIETY: ICD-10-CM

## 2021-06-03 DIAGNOSIS — F32.A DEPRESSION, UNSPECIFIED DEPRESSION TYPE: ICD-10-CM

## 2021-06-03 RX ORDER — BUSPIRONE HYDROCHLORIDE 15 MG/1
TABLET ORAL
Qty: 60 TABLET | Refills: 0 | OUTPATIENT
Start: 2021-06-03

## 2021-07-23 NOTE — ASSESSMENT & PLAN NOTE
· Not on lasix, unable to tolerated due to underlying hypotension  · Continue to monitor  · Elevate legs  Limit

## 2022-02-16 ENCOUNTER — APPOINTMENT (EMERGENCY)
Dept: VASCULAR ULTRASOUND | Facility: HOSPITAL | Age: 72
DRG: 872 | End: 2022-02-16
Payer: MEDICARE

## 2022-02-16 ENCOUNTER — APPOINTMENT (EMERGENCY)
Dept: RADIOLOGY | Facility: HOSPITAL | Age: 72
DRG: 872 | End: 2022-02-16
Payer: MEDICARE

## 2022-02-16 ENCOUNTER — APPOINTMENT (INPATIENT)
Dept: RADIOLOGY | Facility: HOSPITAL | Age: 72
DRG: 872 | End: 2022-02-16
Payer: MEDICARE

## 2022-02-16 ENCOUNTER — HOSPITAL ENCOUNTER (INPATIENT)
Facility: HOSPITAL | Age: 72
LOS: 6 days | Discharge: NURSING, REHAB OR LONG-TERM CARE | DRG: 872 | End: 2022-02-22
Attending: EMERGENCY MEDICINE | Admitting: INTERNAL MEDICINE
Payer: MEDICARE

## 2022-02-16 ENCOUNTER — APPOINTMENT (INPATIENT)
Dept: CT IMAGING | Facility: HOSPITAL | Age: 72
DRG: 872 | End: 2022-02-16
Payer: MEDICARE

## 2022-02-16 DIAGNOSIS — L03.119 RECURRENT CELLULITIS OF LOWER EXTREMITY: ICD-10-CM

## 2022-02-16 DIAGNOSIS — L03.115 BILATERAL CELLULITIS OF LOWER LEG: Primary | ICD-10-CM

## 2022-02-16 DIAGNOSIS — Z86.19 HISTORY OF CLOSTRIDIUM DIFFICILE COLITIS: ICD-10-CM

## 2022-02-16 DIAGNOSIS — L03.116 BILATERAL CELLULITIS OF LOWER LEG: Primary | ICD-10-CM

## 2022-02-16 DIAGNOSIS — R55 VASOVAGAL NEAR SYNCOPE: ICD-10-CM

## 2022-02-16 PROBLEM — R65.20 SEVERE SEPSIS (HCC): Status: ACTIVE | Noted: 2020-07-10

## 2022-02-16 PROBLEM — R79.89 ELEVATED D-DIMER: Status: ACTIVE | Noted: 2022-02-16

## 2022-02-16 PROBLEM — R77.8 ELEVATED TROPONIN: Status: ACTIVE | Noted: 2022-02-16

## 2022-02-16 PROBLEM — R79.89 ELEVATED TROPONIN: Status: ACTIVE | Noted: 2022-02-16

## 2022-02-16 LAB
2HR DELTA HS TROPONIN: -25 NG/L
4HR DELTA HS TROPONIN: -5 NG/L
ALBUMIN SERPL BCP-MCNC: 3.5 G/DL (ref 3.4–4.8)
ALP SERPL-CCNC: 70.3 U/L (ref 35–140)
ALT SERPL W P-5'-P-CCNC: 25 U/L (ref 5–54)
ANION GAP SERPL CALCULATED.3IONS-SCNC: 12 MMOL/L (ref 4–13)
AST SERPL W P-5'-P-CCNC: 64 U/L (ref 15–41)
ATRIAL RATE: 84 BPM
ATRIAL RATE: 84 BPM
BASOPHILS # BLD AUTO: 0.03 THOUSANDS/ΜL (ref 0–0.1)
BASOPHILS NFR BLD AUTO: 0 % (ref 0–1)
BILIRUB SERPL-MCNC: 0.59 MG/DL (ref 0.3–1.2)
BUN SERPL-MCNC: 31 MG/DL (ref 6–20)
CALCIUM SERPL-MCNC: 9.4 MG/DL (ref 8.4–10.2)
CARDIAC TROPONIN I PNL SERPL HS: 159 NG/L
CARDIAC TROPONIN I PNL SERPL HS: 179 NG/L
CARDIAC TROPONIN I PNL SERPL HS: 184 NG/L
CHLORIDE SERPL-SCNC: 93 MMOL/L (ref 96–108)
CK MB SERPL-MCNC: 0.3 % (ref 0–2.5)
CK MB SERPL-MCNC: 9.1 NG/ML (ref 0.1–5.9)
CK SERPL-CCNC: 2600.3 U/L (ref 38–234)
CO2 SERPL-SCNC: 23 MMOL/L (ref 22–33)
CREAT SERPL-MCNC: 1.45 MG/DL (ref 0.4–1.1)
D DIMER PPP FEU-MCNC: 5.57 UG/ML FEU
EOSINOPHIL # BLD AUTO: 0 THOUSAND/ΜL (ref 0–0.61)
EOSINOPHIL NFR BLD AUTO: 0 % (ref 0–6)
ERYTHROCYTE [DISTWIDTH] IN BLOOD BY AUTOMATED COUNT: 15.5 % (ref 11.6–15.1)
FLUAV RNA RESP QL NAA+PROBE: NEGATIVE
FLUBV RNA RESP QL NAA+PROBE: NEGATIVE
GFR SERPL CREATININE-BSD FRML MDRD: 35 ML/MIN/1.73SQ M
GLUCOSE SERPL-MCNC: 83 MG/DL (ref 65–140)
HCT VFR BLD AUTO: 31.8 % (ref 34.8–46.1)
HGB BLD-MCNC: 10.1 G/DL (ref 11.5–15.4)
IMM GRANULOCYTES # BLD AUTO: 0.32 THOUSAND/UL (ref 0–0.2)
IMM GRANULOCYTES NFR BLD AUTO: 1 % (ref 0–2)
LACTATE SERPL-SCNC: 0.8 MMOL/L (ref 0–2)
LYMPHOCYTES # BLD AUTO: 0.6 THOUSANDS/ΜL (ref 0.6–4.47)
LYMPHOCYTES NFR BLD AUTO: 2 % (ref 14–44)
MAGNESIUM SERPL-MCNC: 1.9 MG/DL (ref 1.6–2.6)
MCH RBC QN AUTO: 27.4 PG (ref 26.8–34.3)
MCHC RBC AUTO-ENTMCNC: 31.8 G/DL (ref 31.4–37.4)
MCV RBC AUTO: 86 FL (ref 82–98)
MONOCYTES # BLD AUTO: 0.47 THOUSAND/ΜL (ref 0.17–1.22)
MONOCYTES NFR BLD AUTO: 2 % (ref 4–12)
NEUTROPHILS # BLD AUTO: 23.18 THOUSANDS/ΜL (ref 1.85–7.62)
NEUTS SEG NFR BLD AUTO: 95 % (ref 43–75)
NRBC BLD AUTO-RTO: 0 /100 WBCS
NT-PROBNP SERPL-MCNC: ABNORMAL PG/ML
P AXIS: 47 DEGREES
P AXIS: 94 DEGREES
PLATELET # BLD AUTO: 215 THOUSANDS/UL (ref 149–390)
PMV BLD AUTO: 10.3 FL (ref 8.9–12.7)
POTASSIUM SERPL-SCNC: 3.6 MMOL/L (ref 3.5–5)
PR INTERVAL: 170 MS
PR INTERVAL: 176 MS
PROT SERPL-MCNC: 9 G/DL (ref 6.4–8.3)
QRS AXIS: 39 DEGREES
QRS AXIS: 48 DEGREES
QRSD INTERVAL: 105 MS
QRSD INTERVAL: 111 MS
QT INTERVAL: 392 MS
QT INTERVAL: 409 MS
QTC INTERVAL: 467 MS
QTC INTERVAL: 492 MS
RBC # BLD AUTO: 3.68 MILLION/UL (ref 3.81–5.12)
RSV RNA RESP QL NAA+PROBE: NEGATIVE
SARS-COV-2 RNA RESP QL NAA+PROBE: NEGATIVE
SODIUM SERPL-SCNC: 128 MMOL/L (ref 133–145)
T WAVE AXIS: 59 DEGREES
T WAVE AXIS: 60 DEGREES
VENTRICULAR RATE: 85 BPM
VENTRICULAR RATE: 87 BPM
WBC # BLD AUTO: 24.6 THOUSAND/UL (ref 4.31–10.16)

## 2022-02-16 PROCEDURE — 83605 ASSAY OF LACTIC ACID: CPT

## 2022-02-16 PROCEDURE — 82550 ASSAY OF CK (CPK): CPT

## 2022-02-16 PROCEDURE — 73564 X-RAY EXAM KNEE 4 OR MORE: CPT

## 2022-02-16 PROCEDURE — 85025 COMPLETE CBC W/AUTO DIFF WBC: CPT | Performed by: EMERGENCY MEDICINE

## 2022-02-16 PROCEDURE — 87081 CULTURE SCREEN ONLY: CPT

## 2022-02-16 PROCEDURE — 1124F ACP DISCUSS-NO DSCNMKR DOCD: CPT | Performed by: EMERGENCY MEDICINE

## 2022-02-16 PROCEDURE — 82553 CREATINE MB FRACTION: CPT

## 2022-02-16 PROCEDURE — 94664 DEMO&/EVAL PT USE INHALER: CPT

## 2022-02-16 PROCEDURE — 70450 CT HEAD/BRAIN W/O DYE: CPT

## 2022-02-16 PROCEDURE — 84484 ASSAY OF TROPONIN QUANT: CPT | Performed by: EMERGENCY MEDICINE

## 2022-02-16 PROCEDURE — 94760 N-INVAS EAR/PLS OXIMETRY 1: CPT

## 2022-02-16 PROCEDURE — 83735 ASSAY OF MAGNESIUM: CPT | Performed by: EMERGENCY MEDICINE

## 2022-02-16 PROCEDURE — 36415 COLL VENOUS BLD VENIPUNCTURE: CPT | Performed by: EMERGENCY MEDICINE

## 2022-02-16 PROCEDURE — 99223 1ST HOSP IP/OBS HIGH 75: CPT | Performed by: INTERNAL MEDICINE

## 2022-02-16 PROCEDURE — 99285 EMERGENCY DEPT VISIT HI MDM: CPT | Performed by: EMERGENCY MEDICINE

## 2022-02-16 PROCEDURE — 93970 EXTREMITY STUDY: CPT | Performed by: SURGERY

## 2022-02-16 PROCEDURE — 84484 ASSAY OF TROPONIN QUANT: CPT

## 2022-02-16 PROCEDURE — 71045 X-RAY EXAM CHEST 1 VIEW: CPT

## 2022-02-16 PROCEDURE — 80053 COMPREHEN METABOLIC PANEL: CPT | Performed by: EMERGENCY MEDICINE

## 2022-02-16 PROCEDURE — 85379 FIBRIN DEGRADATION QUANT: CPT | Performed by: EMERGENCY MEDICINE

## 2022-02-16 PROCEDURE — 83880 ASSAY OF NATRIURETIC PEPTIDE: CPT

## 2022-02-16 PROCEDURE — G1004 CDSM NDSC: HCPCS

## 2022-02-16 PROCEDURE — 99285 EMERGENCY DEPT VISIT HI MDM: CPT

## 2022-02-16 PROCEDURE — 93010 ELECTROCARDIOGRAM REPORT: CPT | Performed by: INTERNAL MEDICINE

## 2022-02-16 PROCEDURE — 93970 EXTREMITY STUDY: CPT

## 2022-02-16 PROCEDURE — 0241U HB NFCT DS VIR RESP RNA 4 TRGT: CPT

## 2022-02-16 PROCEDURE — 96365 THER/PROPH/DIAG IV INF INIT: CPT

## 2022-02-16 PROCEDURE — 87040 BLOOD CULTURE FOR BACTERIA: CPT | Performed by: EMERGENCY MEDICINE

## 2022-02-16 PROCEDURE — 93005 ELECTROCARDIOGRAM TRACING: CPT

## 2022-02-16 RX ORDER — ESCITALOPRAM OXALATE 20 MG/1
10 TABLET ORAL DAILY
Status: DISCONTINUED | OUTPATIENT
Start: 2022-02-17 | End: 2022-02-22 | Stop reason: HOSPADM

## 2022-02-16 RX ORDER — SODIUM CHLORIDE, SODIUM LACTATE, POTASSIUM CHLORIDE, CALCIUM CHLORIDE 600; 310; 30; 20 MG/100ML; MG/100ML; MG/100ML; MG/100ML
75 INJECTION, SOLUTION INTRAVENOUS CONTINUOUS
Status: DISCONTINUED | OUTPATIENT
Start: 2022-02-16 | End: 2022-02-18

## 2022-02-16 RX ORDER — CEFEPIME HYDROCHLORIDE 2 G/50ML
2000 INJECTION, SOLUTION INTRAVENOUS EVERY 12 HOURS
Status: DISCONTINUED | OUTPATIENT
Start: 2022-02-16 | End: 2022-02-17

## 2022-02-16 RX ORDER — FERROUS SULFATE 325(65) MG
650 TABLET ORAL
Status: DISCONTINUED | OUTPATIENT
Start: 2022-02-17 | End: 2022-02-22 | Stop reason: HOSPADM

## 2022-02-16 RX ORDER — BUSPIRONE HYDROCHLORIDE 5 MG/1
15 TABLET ORAL 2 TIMES DAILY
Status: DISCONTINUED | OUTPATIENT
Start: 2022-02-16 | End: 2022-02-22 | Stop reason: HOSPADM

## 2022-02-16 RX ORDER — ONDANSETRON 2 MG/ML
4 INJECTION INTRAMUSCULAR; INTRAVENOUS EVERY 6 HOURS PRN
Status: DISCONTINUED | OUTPATIENT
Start: 2022-02-16 | End: 2022-02-22 | Stop reason: HOSPADM

## 2022-02-16 RX ORDER — ACETAMINOPHEN 325 MG/1
650 TABLET ORAL EVERY 6 HOURS PRN
Status: DISCONTINUED | OUTPATIENT
Start: 2022-02-16 | End: 2022-02-22 | Stop reason: HOSPADM

## 2022-02-16 RX ORDER — MIDODRINE HYDROCHLORIDE 5 MG/1
10 TABLET ORAL
Status: DISCONTINUED | OUTPATIENT
Start: 2022-02-16 | End: 2022-02-22 | Stop reason: HOSPADM

## 2022-02-16 RX ORDER — CLINDAMYCIN PHOSPHATE 600 MG/50ML
600 INJECTION INTRAVENOUS ONCE
Status: COMPLETED | OUTPATIENT
Start: 2022-02-16 | End: 2022-02-16

## 2022-02-16 RX ORDER — VANCOMYCIN HYDROCHLORIDE 1 G/200ML
12.5 INJECTION, SOLUTION INTRAVENOUS EVERY 24 HOURS
Status: DISCONTINUED | OUTPATIENT
Start: 2022-02-16 | End: 2022-02-16

## 2022-02-16 RX ORDER — SACCHAROMYCES BOULARDII 250 MG
250 CAPSULE ORAL DAILY
Status: DISCONTINUED | OUTPATIENT
Start: 2022-02-17 | End: 2022-02-22 | Stop reason: HOSPADM

## 2022-02-16 RX ORDER — MELATONIN
1000 DAILY
Status: DISCONTINUED | OUTPATIENT
Start: 2022-02-17 | End: 2022-02-22 | Stop reason: HOSPADM

## 2022-02-16 RX ORDER — BUPROPION HYDROCHLORIDE 150 MG/1
300 TABLET ORAL DAILY
Status: DISCONTINUED | OUTPATIENT
Start: 2022-02-17 | End: 2022-02-22 | Stop reason: HOSPADM

## 2022-02-16 RX ORDER — HEPARIN SODIUM 5000 [USP'U]/ML
5000 INJECTION, SOLUTION INTRAVENOUS; SUBCUTANEOUS EVERY 8 HOURS SCHEDULED
Status: DISCONTINUED | OUTPATIENT
Start: 2022-02-16 | End: 2022-02-22 | Stop reason: HOSPADM

## 2022-02-16 RX ADMIN — CLINDAMYCIN PHOSPHATE 600 MG: 600 INJECTION, SOLUTION INTRAVENOUS at 14:13

## 2022-02-16 RX ADMIN — SODIUM CHLORIDE 1000 ML: 0.9 INJECTION, SOLUTION INTRAVENOUS at 16:20

## 2022-02-16 RX ADMIN — SODIUM CHLORIDE, SODIUM LACTATE, POTASSIUM CHLORIDE, AND CALCIUM CHLORIDE 75 ML/HR: .6; .31; .03; .02 INJECTION, SOLUTION INTRAVENOUS at 17:58

## 2022-02-16 RX ADMIN — HEPARIN SODIUM 5000 UNITS: 5000 INJECTION INTRAVENOUS; SUBCUTANEOUS at 22:51

## 2022-02-16 RX ADMIN — MIDODRINE HYDROCHLORIDE 10 MG: 5 TABLET ORAL at 18:10

## 2022-02-16 RX ADMIN — HEPARIN SODIUM 5000 UNITS: 5000 INJECTION INTRAVENOUS; SUBCUTANEOUS at 18:09

## 2022-02-16 RX ADMIN — BUSPIRONE HYDROCHLORIDE 15 MG: 5 TABLET ORAL at 18:10

## 2022-02-16 RX ADMIN — CEFEPIME HYDROCHLORIDE 2000 MG: 2 INJECTION, SOLUTION INTRAVENOUS at 18:04

## 2022-02-16 RX ADMIN — Medication 125 MG: at 19:01

## 2022-02-16 NOTE — ASSESSMENT & PLAN NOTE
· Continue Home Medications:  · Wellbutrin, BuSpar, and Lexapro  · Repeat EKG to monitor for QTC elongation

## 2022-02-16 NOTE — ASSESSMENT & PLAN NOTE
· Suspect in the setting of acute infection vs ACS  · HS Trop: 184--> 159  · Continue to Trend  · Patient denies any acute complaints of chest pain  · Will order telemetry

## 2022-02-16 NOTE — H&P
Radha 43  H&P- Leonor Garcia 1950, 66 y o  female MRN: [id number]  Unit/Bed#: -01 Encounter: [id number]  Primary Care Provider: Paul Lund MD   Date and time admitted to hospital: 2/16/2022 12:33 PM    * Severe sepsis ([facility] [address] )  Assessment & Plan  · POA  · SIRS: Tachycardia ('s), Hypotension (SBP<95), Leukocytosis (WBC 23)  · End organ Dysfunction: Acute kidney Injury   · Infection: Suspect worsening B/L LE cellulitis vs UTI vs Pneumonia  · Fluids: S/P 1L NSS Bolus in the ED  Will c/w LR @ 75 ml/hr  · Will hold off from sepsis protocol Fluid resuscitation due to severe aortic stenosis and heart failure  · Monitor closely for signs of Volume overload   · ABX: S/P Clindamycin in the ED  Will transition to IV Cefepime while admitted  · CXR: Pending  · Lactic: Negative  · Procal: Pending  · Cultures:  · BC x2: Pending  · U/A and U/C: Awaiting collection  · Infectious Disease Consulted  · Trend CBC/Fever Curve    Acute kidney injury ([facility] [address] )  Assessment & Plan  · POA  · Crt on admisison: 1 44  · Baseline Crt   Appears to be around 0 7-0 8  · Likely secondary to sepsis and poor oral intake  · S/P IV hydration  · Avoid/Limit Nephrotoxins  · Avoid hypotension and fluctuation and BP  · Continue monitor renal function    Elevated troponin  Assessment & Plan  · Suspect in the setting of acute infection vs ACS  · HS Trop: 184--> 159  · Continue to Trend  · Patient denies any acute complaints of chest pain  · Will order telemetry    Recurrent cellulitis of lower extremity  Assessment & Plan            · Patient with history of recurrent cellulitis  · Patient states she has wound nurses following at home with frequent dressing changes  · Patient presented with worsening erythema and suspected cellulitis of the bilateral lower extremities  · Wound Care Consulted  · Plan as above under severe sepsis  · Continue supportive care    Elevated d-dimer  Assessment & Plan  · POA  · D-Dimer: 5 57  · Vasc Duplex:  No evidence of acute or chronic DVTs bilaterally  No evidence of superficial thrombophlebitis noted bilaterally  · No acute complaints of shortness of breath   · Will hold off on ordering CTA  · Continue to trend    Ambulatory dysfunction  Assessment & Plan  · Pre-Syncope  · Patient with History of recurrent falls over the last 2 days  · CTH: Pending  · Xray RLE:  No acute osseous abnormality  · Xray LLE:  No acute osseous abnormality  · Initiate fall precautions   · Perform Orthostatic Blood pressures, starting tomorrow 2/17  · PT/OT evaluation: Pending    C  difficile colitis  Assessment & Plan  · History of C-Diff infection  · Will initiate prophylactic PO Vancomycin in the setting of acute infection and antibiotic use  · Patient with episode of diarrhea this morning  · Will order C-Diff sample    Severe aortic stenosis  Assessment & Plan  · History of severe aortic stenosis  · Previous Echo (5/2021): Ef 55% with Grade 1 diastolic dysfunction and Severe Aortic stenosis  · Will repeat echo  · Monitor I/O's and volume status  · Daily weights    Idiopathic hypotension  Assessment & Plan  · Continue home medication:  · Midodrine 10 mg t i d   · Hold parameters for SBP >/= 130    Depression with anxiety  Assessment & Plan  · Continue Home Medications:  · Wellbutrin, BuSpar, and Lexapro  · Repeat EKG to monitor for QTC elongation    VTE Pharmacologic Prophylaxis: VTE Score: 5 High Risk (Score >/= 5) - Pharmacological DVT Prophylaxis Ordered: heparin  Sequential Compression Devices Ordered  Code Status: Level 1 - Full Code   Discussion with family: Updated  (sister) at bedside  Anticipated Length of Stay: Patient will be admitted on an inpatient basis with an anticipated length of stay of greater than 2 midnights secondary to Severe sepsis secondary to cellulitis requiring IV antibiotics and IV hydration       Total Time for Visit, including Counseling / Coordination of Care: 60 minutes Greater than 50% of this total time spent on direct patient counseling and coordination of care  Chief Complaint:  Worsening lower extremity erythema and falls    History of Present Illness:  Kehinde Vasquez is a 66 y o  female with a PMH of anxiety, depression, idiopathic hypotension, severe aortic stenosis, and recurring cellulitis who presents with worsening lower extremity erythema and ambulatory dysfunction over the past 2 days resulting in falls  Upon arrival to the ED patient was found to be tachycardic and hypotensive  Patient received 1 L NSS bolus per sepsis protocol  Patient received IV antibiotics with clindamycin in the ED  on arrival patient was also found to have acute kidney injury, elevated troponin count and elevated D-dimer  Patient will be admitted for severe sepsis and treatment of recurrent bilateral lower extremity cellulitis  Review of Systems:  Review of Systems   Constitutional: Positive for appetite change and fatigue  Negative for chills and fever  HENT: Negative for congestion and trouble swallowing  Respiratory: Negative for cough, chest tightness and shortness of breath  Cardiovascular: Positive for leg swelling  Negative for chest pain and palpitations  Gastrointestinal: Negative for abdominal pain, diarrhea, nausea and vomiting  Genitourinary: Negative for difficulty urinating, dysuria and frequency  Musculoskeletal: Negative for arthralgias  Skin: Positive for wound  Neurological: Positive for dizziness, syncope and weakness  Negative for numbness and headaches  Psychiatric/Behavioral: Negative for confusion  All other systems reviewed and are negative        Past Medical and Surgical History:   Past Medical History:   Diagnosis Date    Anemia     Anxiety     Chronic back pain     Hyperkalemia     Hypertension     Hypotension     Lymphedema     BRANDYN (obstructive sleep apnea)     Psychiatric disorder     depression       Past Surgical History:   Procedure Laterality Date    APPENDECTOMY      GASTRIC BYPASS      obesity        Meds/Allergies:  Prior to Admission medications    Medication Sig Start Date End Date Taking? Authorizing Provider   acetaminophen (TYLENOL) 325 mg tablet Take 2 tablets (650 mg total) by mouth every 6 (six) hours as needed for mild pain or moderate pain 9/25/20   Nette Hernadez MD   buPROPion (WELLBUTRIN XL) 300 mg 24 hr tablet Take 1 tablet (300 mg total) by mouth daily 10/2/20   Gwendolyn Quarles PA-C   busPIRone (BUSPAR) 15 mg tablet Take 1 tablet (15 mg total) by mouth 2 (two) times a day 10/2/20   Gwendolyn Gaines PA-C   cholecalciferol (VITAMIN D3) 1,000 units tablet Take 1,000 Units by mouth daily     Historical Provider, MD   Cyanocobalamin (B-12 COMPLIANCE INJECTION IJ) Inject as directed    Historical Provider, MD   cyclobenzaprine (FLEXERIL) 5 mg tablet Take 1 tablet (5 mg total) by mouth 2 (two) times a day as needed for muscle spasms 8/18/20   Gwendolyn Quarles PA-C   diphenhydrAMINE (BENADRYL) 25 mg tablet Take 25 mg by mouth every 8 (eight) hours as needed    Historical Provider, MD   escitalopram (LEXAPRO) 10 mg tablet Take 1 tablet (10 mg total) by mouth daily 10/2/20   Gwendolyn Quarles PA-C   ferrous sulfate 325 (65 Fe) mg tablet Take 2 tablets (650 mg total) by mouth daily with breakfast 10/2/20   Gwendolyn Quarles PA-C   lidocaine (LIDODERM) 5 % Apply 1 patch topically daily Remove & Discard patch within 12 hours or as directed by MD  Patient not taking: Reported on 9/22/2020 7/31/20   Fernanda Ohara MD   midodrine (PROAMATINE) 10 MG tablet Take 1 tablet PO TID, hold for SBP >/= 130 10/2/20   Gwendolyn Quarles PA-C   multivitamin (THERAGRAN) TABS Take 1 tablet by mouth daily    Historical Provider, MD   Probiotic Product (PROBIOTIC PO) Take 2 capsules by mouth daily    Historical Provider, MD     I have reviewed home medications with patient personally  Allergies:    Allergies   Allergen Reactions    Aspirin     Cephalosporins      Has tolerated cefepime    Penicillins Other (See Comments)     Unknown reaction during childhood   Valium [Diazepam]     Ciprofloxacin Rash    Sulfa Antibiotics Rash     Itching, rash    Vancomycin Rash       Social History:  Marital Status:    Patient Pre-hospital Living Situation: Home  Patient Pre-hospital Level of Mobility: walks with walker    Substance Use History:   Social History     Substance and Sexual Activity   Alcohol Use Not Currently    Comment: Alcohol intake:   None - As per [place]      Social History     Tobacco Use   Smoking Status Never Smoker   Smokeless Tobacco Never Used   Tobacco Comment    Never smoker - As per [place]      Social History     Substance and Sexual Activity   Drug Use Never    Comment: Illicit drugs:   no - As per [place]        Family History:  Family History   Family history unknown: Yes       Physical Exam:     Vitals:   Blood Pressure: 100/53 (02/16/22 1710)  Pulse: 96 (02/16/22 1710)  Temperature: 99 9 °F (37 7 °C) (02/16/22 1710)  Temp Source: Tympanic (02/16/22 1710)  Respirations: 16 (02/16/22 1710)  Height: 4' 11 75" (151 8 cm) (02/16/22 1710)  Weight - Scale: 70 4 kg (155 lb 3 3 oz) (02/16/22 1710)  SpO2: 100 % (02/16/22 1710)    Physical Exam  Vitals and nursing note reviewed  Constitutional:       General: She is not in acute distress  Appearance: She is obese  She is ill-appearing  HENT:      Head: Normocephalic  Nose: Nose normal  No congestion  Mouth/Throat:      Mouth: Mucous membranes are dry  Pharynx: Oropharynx is clear  Cardiovascular:      Rate and Rhythm: Regular rhythm  Tachycardia present  Pulses: Normal pulses  Heart sounds: Murmur heard  Pulmonary:      Effort: Pulmonary effort is normal  No respiratory distress  Breath sounds: Decreased breath sounds present  Abdominal:      General: Bowel sounds are normal  There is no distension        Palpations: Abdomen is soft       Tenderness: There is no abdominal tenderness  Musculoskeletal:         General: Normal range of motion  Cervical back: Normal range of motion  Right lower leg: Edema present  Left lower leg: Edema present  Skin:     General: Skin is warm and dry  Capillary Refill: Capillary refill takes less than 2 seconds  Findings: Erythema (B/L LE) present  Neurological:      Mental Status: She is alert and oriented to person, place, and time  Mental status is at baseline  Motor: Weakness (Generalized) present  Psychiatric:         Mood and Affect: Mood is anxious  Speech: Speech normal          Behavior: Behavior is cooperative  Judgment: Judgment normal           Additional Data:     Lab Results:  Results from last 7 days   Lab Units 02/16/22  1410   WBC Thousand/uL 24 60*   HEMOGLOBIN g/dL 10 1*   HEMATOCRIT % 31 8*   PLATELETS Thousands/uL 215   NEUTROS PCT % 95*   LYMPHS PCT % 2*   MONOS PCT % 2*   EOS PCT % 0     Results from last 7 days   Lab Units 02/16/22  1410   SODIUM mmol/L 128*   POTASSIUM mmol/L 3 6   CHLORIDE mmol/L 93*   CO2 mmol/L 23   BUN mg/dL 31*   CREATININE mg/dL 1 45*   ANION GAP mmol/L 12   CALCIUM mg/dL 9 4   ALBUMIN g/dL 3 5   TOTAL BILIRUBIN mg/dL 0 59   ALK PHOS U/L 70 3   ALT U/L 25   AST U/L 64*   GLUCOSE RANDOM mg/dL 83                 Results from last 7 days   Lab Units 02/16/22  1620   LACTIC ACID mmol/L 0 8       Imaging: Reviewed radiology reports from this admission including: xray(s)  XR knee 4+ vw right injury   Final Result by Madison Lennox, MD (02/16 0134)      No acute osseous abnormality  Degenerative changes as described  Workstation performed: [id number]         XR knee 4+ vw left injury   Final Result by Madison Lennox, MD (02/16 2651)      No acute osseous abnormality  Degenerative changes as described              Workstation performed: [id number]         VAS lower limb venous duplex study, complete bilateral   Final Result by Matilda Stevenson MD (02/16 1718)      XR chest portable    (Results Pending)   CT head wo contrast    (Results Pending)       EKG and Other Studies Reviewed on Admission:   · EKG: NSR  HR 87    ** Please Note: This note has been constructed using a voice recognition system   **

## 2022-02-16 NOTE — ASSESSMENT & PLAN NOTE
· POA  · SIRS: Tachycardia ('s), Hypotension (SBP<95), Leukocytosis (WBC 23)  · End organ Dysfunction: Acute kidney Injury   · Infection: Suspect worsening B/L LE cellulitis vs UTI vs Pneumonia  · Fluids: S/P 1L NSS Bolus in the ED  Will c/w LR @ 75 ml/hr  · Will hold off from sepsis protocol Fluid resuscitation due to severe aortic stenosis and heart failure  · Monitor closely for signs of Volume overload   · ABX: S/P Clindamycin in the ED   Will transition to IV Cefepime while admitted  · CXR: Pending  · Lactic: Negative  · Procal: Pending  · Cultures:  · BC x2: Pending  · U/A and U/C: Awaiting collection  · Infectious Disease Consulted  · Trend CBC/Fever Curve

## 2022-02-16 NOTE — RESPIRATORY THERAPY NOTE
RT Protocol Note  Ortega Noble 66 y o  female MRN: [id number]  Unit/Bed#: -01 Encounter: [id number]    Assessment    Principal Problem:    Severe sepsis [facility])  Active Problems:    Depression with anxiety    Recurrent cellulitis of lower extremity    Idiopathic hypotension    Acute kidney injury ([address] )    Severe aortic stenosis    Ambulatory dysfunction    C  difficile colitis    Elevated troponin    Elevated d-dimer      Home Pulmonary Medications:  None       Past Medical History:   Diagnosis Date    Anemia     Anxiety     Chronic back pain     Hyperkalemia     Hypertension     Hypotension     Lymphedema     BRANDYN (obstructive sleep apnea)     Psychiatric disorder     depression     Social History     Socioeconomic History    Marital status:       Spouse name: None    Number of children: None    Years of education: None    Highest education level: None   Occupational History    None   Tobacco Use    Smoking status: Never Smoker    Smokeless tobacco: Never Used    Tobacco comment: Never smoker - As per [employer] Use    Vaping Use: Never used   Substance and Sexual Activity    Alcohol use: Not Currently     Comment: Alcohol intake:   None - As per Daniel Cordova Drug use: Never     Comment: Illicit drugs:   no - As per Daniel Cordova Sexual activity: Not Currently   Other Topics Concern    None   Social History Narrative    · Most recent tobacco use screenin-      · Do you currently or have you served in the [employer]:   No      · Alcohol intake:   None      · Illicit drugs:   no     - As per [place]      Social Determinants of Health     Financial Resource Strain: Not on file   Food Insecurity: Not on file   Transportation Needs: Not on file   Physical Activity: Not on file   Stress: Not on file   Social Connections: Not on file   Intimate Partner Violence: Not on file   Housing Stability: Not on file       Subjective         Objective    Physical Exam:   Assessment Type: Assess only  General Appearance: Alert,Awake  Respiratory Pattern: Normal  Chest Assessment: Chest expansion symmetrical  Bilateral Breath Sounds: Clear  O2 Device: RA    Vitals:  Blood pressure 100/53, pulse 92, temperature 99 9 °F (37 7 °C), temperature source Tympanic, resp  rate 16, height 4' 11 75" (1 518 m), weight 70 4 kg (155 lb 3 3 oz), SpO2 99 %  Imaging and other studies: I have personally reviewed pertinent reports  O2 Device: RA     Plan    Respiratory Plan: No distress/Pulmonary history        Resp Comments: Assessed pt per protocol  Pt has no pulmonary history  Breath sounds are clear  Pt is not in any respiratory distress  Pt is on RA  Schedule treatment is not indicated  Respiratory to follow if needed

## 2022-02-16 NOTE — ED NOTES
Dr Jim Rudd aware pt hard stick, unable to get labs/IV at this time        [address], RN  02/16/22 1314

## 2022-02-16 NOTE — ASSESSMENT & PLAN NOTE
· Patient with history of recurrent cellulitis  · Patient states she has wound nurses following at home with frequent dressing changes  · Patient presented with worsening erythema and suspected cellulitis of the bilateral lower extremities  · Wound Care Consulted  · Plan as above under severe sepsis  · Continue supportive care

## 2022-02-16 NOTE — ASSESSMENT & PLAN NOTE
· History of severe aortic stenosis  · Previous Echo (5/2021): Ef 55% with Grade 1 diastolic dysfunction and Severe Aortic stenosis  · Will repeat echo  · Monitor I/O's and volume status  · Daily weights

## 2022-02-16 NOTE — ASSESSMENT & PLAN NOTE
· POA  · D-Dimer: 5 57  · Vasc Duplex:  No evidence of acute or chronic DVTs bilaterally  No evidence of superficial thrombophlebitis noted bilaterally    · No acute complaints of shortness of breath   · Will hold off on ordering CTA  · Continue to trend

## 2022-02-16 NOTE — ASSESSMENT & PLAN NOTE
· Pre-Syncope  · Patient with History of recurrent falls over the last 2 days  · CTH: Pending  · Xray RLE:  No acute osseous abnormality  · Xray LLE:  No acute osseous abnormality  · Initiate fall precautions   · Perform Orthostatic Blood pressures, starting tomorrow 2/17  · PT/OT evaluation: Pending

## 2022-02-16 NOTE — ASSESSMENT & PLAN NOTE
· POA  · Crt on admisison: 1 44  · Baseline Crt   Appears to be around 0 7-0 8  · Likely secondary to sepsis and poor oral intake  · S/P IV hydration  · Avoid/Limit Nephrotoxins  · Avoid hypotension and fluctuation and BP  · Continue monitor renal function

## 2022-02-16 NOTE — ASSESSMENT & PLAN NOTE
· History of C-Diff infection  · Will initiate prophylactic PO Vancomycin in the setting of acute infection and antibiotic use  · Patient with episode of diarrhea this morning  · Will order C-Diff sample

## 2022-02-16 NOTE — ED PROVIDER NOTES
History  Chief Complaint   Patient presents with    Cellulitis     arrived via [facility] with reports of worsening b/l leg cellulitis     Fall     pt reports falling last evening with head strike  denies LOC  also states she fell to her knees this am and c/o B/l knee pain     66-year-old female, reports over the past 2 days she has had 2 episodes where she became lightheaded, fell down onto her knees, patient reports a decreased level awareness but did not syncopize  Patient denies any headache, fever, cough, shortness of breath, nausea, vomiting, abdominal pain, no urinary or GI complaints  Patient does endorse a feeling of chills, reports she has a history of lymphedema, her bilateral lower extremity 7 increasingly swollen, tender, her visiting nurse reports she believes her lower extremities may have cellulitis bilaterally  Patient is on midodrine for hypotension  Patient endorses hitting her head once when she fell, is not on any blood thinners, has tenderness in her left posterior scalp          Prior to Admission Medications   Prescriptions Last Dose Informant Patient Reported? Taking?    Cyanocobalamin (B-12 COMPLIANCE INJECTION IJ)   Yes No   Sig: Inject as directed   Probiotic Product (PROBIOTIC PO)  Self Yes No   Sig: Take 2 capsules by mouth daily   acetaminophen (TYLENOL) 325 mg tablet   No No   Sig: Take 2 tablets (650 mg total) by mouth every 6 (six) hours as needed for mild pain or moderate pain   buPROPion (WELLBUTRIN XL) 300 mg 24 hr tablet   No No   Sig: Take 1 tablet (300 mg total) by mouth daily   busPIRone (BUSPAR) 15 mg tablet   No No   Sig: Take 1 tablet (15 mg total) by mouth 2 (two) times a day   cholecalciferol (VITAMIN D3) 1,000 units tablet  Self Yes No   Sig: Take 1,000 Units by mouth daily    cyclobenzaprine (FLEXERIL) 5 mg tablet   No No   Sig: Take 1 tablet (5 mg total) by mouth 2 (two) times a day as needed for muscle spasms   diphenhydrAMINE (BENADRYL) 25 mg tablet   Yes No Sig: Take 25 mg by mouth every 8 (eight) hours as needed   escitalopram (LEXAPRO) 10 mg tablet   No No   Sig: Take 1 tablet (10 mg total) by mouth daily   ferrous sulfate 325 (65 Fe) mg tablet   No No   Sig: Take 2 tablets (650 mg total) by mouth daily with breakfast   lidocaine (LIDODERM) 5 %   No No   Sig: Apply 1 patch topically daily Remove & Discard patch within 12 hours or as directed by MD   Patient not taking: Reported on 9/22/2020   midodrine (PROAMATINE) 10 MG tablet   No No   Sig: Take 1 tablet PO TID, hold for SBP >/= 130   multivitamin (THERAGRAN) TABS   Yes No   Sig: Take 1 tablet by mouth daily      Facility-Administered Medications: None       Past Medical History:   Diagnosis Date    Anemia     Anxiety     Chronic back pain     Hyperkalemia     Hypertension     Hypotension     Lymphedema     BRANDYN (obstructive sleep apnea)     Psychiatric disorder     depression       Past Surgical History:   Procedure Laterality Date    APPENDECTOMY      GASTRIC BYPASS      obesity        Family History   Family history unknown: Yes     I have reviewed and agree with the history as documented  E-Cigarette/Vaping    E-Cigarette Use Never User      E-Cigarette/Vaping Substances    Nicotine No     THC No     CBD No     Flavoring No     Other No     Unknown No      Social History     Tobacco Use    Smoking status: Never Smoker    Smokeless tobacco: Never Used    Tobacco comment: Never smoker - As per [employer] Vaping Use: Never used   Substance Use Topics    Alcohol use: Not Currently     Comment: Alcohol intake:   None - As per Airam Rodriguez Drug use: Never     Comment: Illicit drugs:   no - As per [place]        Review of Systems   Constitutional: Negative for fever  HENT: Negative for congestion  Eyes: Negative for visual disturbance  Respiratory: Negative for cough and shortness of breath  Cardiovascular: Negative for chest pain     Gastrointestinal: Negative for constipation, diarrhea, nausea and vomiting  Endocrine: Negative for polyuria  Genitourinary: Negative for dysuria and hematuria  Musculoskeletal: Positive for myalgias  Neurological: Positive for weakness and light-headedness  Negative for headaches  Physical Exam  Physical Exam  Vitals and nursing note reviewed  Constitutional:       Appearance: Normal appearance  HENT:      Head: Normocephalic  Comments: Tenderness and mild swelling to the left posterior lateral scalp at area head strike     Right Ear: External ear normal       Left Ear: External ear normal       Mouth/Throat:      Mouth: Mucous membranes are moist       Pharynx: Oropharynx is clear  Eyes:      Extraocular Movements: Extraocular movements intact  Conjunctiva/sclera: Conjunctivae normal    Cardiovascular:      Rate and Rhythm: Normal rate and regular rhythm  Heart sounds: Normal heart sounds  Pulmonary:      Effort: Pulmonary effort is normal       Breath sounds: Normal breath sounds  Abdominal:      General: Abdomen is flat  There is no distension  Palpations: Abdomen is soft  Musculoskeletal:      Cervical back: Normal range of motion  Comments: Tenderness to palpation of the bilateral knees with swelling and erythema   Skin:     General: Skin is warm and dry  Comments: Severe swelling, well-demarcated erythema, scaling of the bilateral lower extremities, significantly warm to the touch with serous discharge  Swelling is circumferential, extends on the posterior aspects to the hips   Neurological:      General: No focal deficit present  Mental Status: She is alert        Gait: Gait normal    Psychiatric:         Mood and Affect: Mood normal          Behavior: Behavior normal          Vital Signs  ED Triage Vitals [02/16/22 1246]   Temperature Pulse Respirations Blood Pressure SpO2   97 9 °F (36 6 °C) 80 18 96/53 100 %      Temp Source Heart Rate Source Patient Position - Orthostatic VS BP Location FiO2 (%)   Oral -- -- -- --      Pain Score       7           Vitals:    02/16/22 1246   BP: 96/53   Pulse: 80         Visual Acuity      ED Medications  Medications   sodium chloride 0 9 % bolus 1,000 mL (has no administration in time range)   clindamycin (CLEOCIN) IVPB (premix in dextrose) 600 mg 50 mL (0 mg Intravenous Stopped 2/16/22 1443)       Diagnostic Studies  Results Reviewed     Procedure Component Value Units Date/Time    D-dimer, quantitative [[id number]]  (Abnormal) Collected: 02/16/22 1410    Lab Status: Final result Specimen: Blood from Arm, Right Updated: 02/16/22 1445     D-Dimer, Quant 5 57 ug/ml FEU     CBC and differential [[id number]]  (Abnormal) Collected: 02/16/22 1410    Lab Status: Final result Specimen: Blood from Arm, Right Updated: 02/16/22 1445     WBC 24 60 Thousand/uL      RBC 3 68 Million/uL      Hemoglobin 10 1 g/dL      Hematocrit 31 8 %      MCV 86 fL      MCH 27 4 pg      MCHC 31 8 g/dL      RDW 15 5 %      MPV 10 3 fL      Platelets 637 Thousands/uL      nRBC 0 /100 WBCs      Neutrophils Relative 95 %      Immat GRANS % 1 %      Lymphocytes Relative 2 %      Monocytes Relative 2 %      Eosinophils Relative 0 %      Basophils Relative 0 %      Neutrophils Absolute 23 18 Thousands/µL      Immature Grans Absolute 0 32 Thousand/uL      Lymphocytes Absolute 0 60 Thousands/µL      Monocytes Absolute 0 47 Thousand/µL      Eosinophils Absolute 0 00 Thousand/µL      Basophils Absolute 0 03 Thousands/µL     Narrative: This is an appended report  These results have been appended to a previously verified report      HS Troponin 0hr (reflex protocol) [[id number]]  (Abnormal) Collected: 02/16/22 1410    Lab Status: Final result Specimen: Blood from Arm, Right Updated: 02/16/22 1443     hs TnI 0hr 184 ng/L     HS Troponin I 2hr [[id number]]     Lab Status: No result Specimen: Blood     Comprehensive metabolic panel [[id number]]  (Abnormal) Collected: 02/16/22 1410    Lab Status: Final result Specimen: Blood from Arm, Right Updated: 02/16/22 1437     Sodium 128 mmol/L      Potassium 3 6 mmol/L      Chloride 93 mmol/L      CO2 23 mmol/L      ANION GAP 12 mmol/L      BUN 31 mg/dL      Creatinine 1 45 mg/dL      Glucose 83 mg/dL      Calcium 9 4 mg/dL      AST 64 U/L      ALT 25 U/L      Alkaline Phosphatase 70 3 U/L      Total Protein 9 0 g/dL      Albumin 3 5 g/dL      Total Bilirubin 0 59 mg/dL      eGFR 35 ml/min/1 73sq m     Narrative:      [place] guidelines for Chronic Kidney Disease (CKD):     Stage 1 with normal or high GFR (GFR > 90 mL/min/1 73 square meters)    Stage 2 Mild CKD (GFR = 60-89 mL/min/1 73 square meters)    Stage 3A Moderate CKD (GFR = 45-59 mL/min/1 73 square meters)    Stage 3B Moderate CKD (GFR = 30-44 mL/min/1 73 square meters)    Stage 4 Severe CKD (GFR = 15-29 mL/min/1 73 square meters)    Stage 5 End Stage CKD (GFR <15 mL/min/1 73 square meters)  Note: GFR calculation is accurate only with a steady state creatinine    Magnesium [[id number]]  (Normal) Collected: 02/16/22 1410    Lab Status: Final result Specimen: Blood from Arm, Right Updated: 02/16/22 1437     Magnesium 1 9 mg/dL     Blood culture #2 [[id number]] Collected: 02/16/22 1410    Lab Status: In process Specimen: Blood from Arm, Right Updated: 02/16/22 1415    Blood culture #1 [[id number]] Collected: 02/16/22 1410    Lab Status:  In process Specimen: Blood from Arm, Left Updated: 02/16/22 1415    UA (URINE) with reflex to Scope [[id number]]     Lab Status: No result Specimen: Urine                  VAS lower limb venous duplex study, complete bilateral    (Results Pending)   XR knee 4+ vw left injury    (Results Pending)   XR knee 4+ vw right injury    (Results Pending)              Procedures  ECG 12 Lead Documentation Only    Date/Time: 2/16/2022 2:29 PM  Performed by: Aung Pal MD  Authorized by: Aung Pal MD     ECG reviewed by me, the ED Provider: yes    Patient location:  ED  Interpretation:     Interpretation: abnormal    Rate:     ECG rate:  87    ECG rate assessment: normal    Rhythm:     Rhythm: sinus rhythm    Ectopy:     Ectopy: none    QRS:     QRS axis:  Normal  Conduction:     Conduction: normal    ST segments:     ST segments:  Abnormal (1 mm ST-elevation in V2, V3, likely secondary to ventricular hypertrophy)  T waves:     T waves: normal      T waves comment:  Peaked T-waves in V2, V3  Other findings:     Other findings: LVH               ED Course         MDM  Number of Diagnoses or Management Options  Bilateral cellulitis of lower leg  Vasovagal near syncope  Diagnosis management comments: Patient has an elevated white count, however patient has no other sirs criteria met, therefore while infection is considered I do not believe the patient has sepsis at this time  Blood cultures have been drawn prior to antibiotic administration, the patient will be admitted for further management  Disposition  Final diagnoses:   Bilateral cellulitis of lower leg   Vasovagal near syncope     Time reflects when diagnosis was documented in both MDM as applicable and the Disposition within this note     Time User Action Codes Description Comment    2/16/2022  3:41 PM Delmon Closs Add [C22 401,  P55 111] Bilateral cellulitis of lower leg     2/16/2022  3:43 PM Delmon Closs Add [R55] Vasovagal near syncope       ED Disposition     ED Disposition Condition Date/Time Comment    Admit Stable Wed Feb 16, 2022  3:40 PM Case was discussed with Dr Reece Ryder and the patient's admission status was agreed to be Admission Status: inpatient status to the service of Dr Reece Ryder  Follow-up Information    None         Patient's Medications   Discharge Prescriptions    No medications on file       No discharge procedures on file      PDMP Review     None          ED Provider  Electronically Signed by           Krystyna Vasquez MD  02/16/22 1542

## 2022-02-17 ENCOUNTER — APPOINTMENT (INPATIENT)
Dept: NON INVASIVE DIAGNOSTICS | Facility: HOSPITAL | Age: 72
DRG: 872 | End: 2022-02-17
Payer: MEDICARE

## 2022-02-17 PROBLEM — Z86.19 HISTORY OF CLOSTRIDIUM DIFFICILE COLITIS: Status: ACTIVE | Noted: 2020-08-12

## 2022-02-17 LAB
ALBUMIN SERPL BCP-MCNC: 2.6 G/DL (ref 3.4–4.8)
ALP SERPL-CCNC: 54.3 U/L (ref 35–140)
ALT SERPL W P-5'-P-CCNC: 20 U/L (ref 5–54)
ANION GAP SERPL CALCULATED.3IONS-SCNC: 11 MMOL/L (ref 4–13)
AORTIC ROOT: 2.4 CM
AORTIC VALVE MEAN VELOCITY: 44 M/S
APICAL FOUR CHAMBER EJECTION FRACTION: 60 %
AST SERPL W P-5'-P-CCNC: 53 U/L (ref 15–41)
ATRIAL RATE: 91 BPM
AV AREA BY CONTINUOUS VTI: 0.3 CM2
AV AREA PEAK VELOCITY: 0.3 CM2
AV LVOT MEAN GRADIENT: 2 MMHG
AV LVOT PEAK GRADIENT: 3 MMHG
AV MEAN GRADIENT: 83 MMHG
AV PEAK GRADIENT: 126 MMHG
AV REGURGITATION PRESSURE HALF TIME: 424 MS
AV VALVE AREA: 0.33 CM2
AV VELOCITY RATIO: 0.16
BACTERIA UR QL AUTO: ABNORMAL /HPF
BASOPHILS # BLD AUTO: 0.02 THOUSANDS/ΜL (ref 0–0.1)
BASOPHILS NFR BLD AUTO: 0 % (ref 0–1)
BILIRUB SERPL-MCNC: 0.36 MG/DL (ref 0.3–1.2)
BILIRUB UR QL STRIP: NEGATIVE
BUN SERPL-MCNC: 31 MG/DL (ref 6–20)
CALCIUM ALBUM COR SERPL-MCNC: 9.3 MG/DL (ref 8.3–10.1)
CALCIUM SERPL-MCNC: 8.2 MG/DL (ref 8.4–10.2)
CHLORIDE SERPL-SCNC: 102 MMOL/L (ref 96–108)
CLARITY UR: CLEAR
CO2 SERPL-SCNC: 22 MMOL/L (ref 22–33)
COLOR UR: YELLOW
CREAT SERPL-MCNC: 1.22 MG/DL (ref 0.4–1.1)
CRP SERPL QL: 33.2 MG/DL (ref 0–1)
D DIMER PPP FEU-MCNC: 3.99 UG/ML FEU
DOP CALC AO PEAK VEL: 5.6 M/S
DOP CALC AO VTI: 147.28 CM
DOP CALC LVOT AREA: 2.01 CM2
DOP CALC LVOT DIAMETER: 1.6 CM
DOP CALC LVOT PEAK VEL VTI: 23.9 CM
DOP CALC LVOT PEAK VEL: 0.91 M/S
DOP CALC LVOT STROKE INDEX: 28 ML/M2
DOP CALC LVOT STROKE VOLUME: 48.03 CM3
E WAVE DECELERATION TIME: 190 MS
EOSINOPHIL # BLD AUTO: 0 THOUSAND/ΜL (ref 0–0.61)
EOSINOPHIL NFR BLD AUTO: 0 % (ref 0–6)
ERYTHROCYTE [DISTWIDTH] IN BLOOD BY AUTOMATED COUNT: 15.4 % (ref 11.6–15.1)
FRACTIONAL SHORTENING: 40 % (ref 28–44)
GFR SERPL CREATININE-BSD FRML MDRD: 44 ML/MIN/1.73SQ M
GLUCOSE SERPL-MCNC: 65 MG/DL (ref 65–140)
GLUCOSE UR STRIP-MCNC: NEGATIVE MG/DL
HCT VFR BLD AUTO: 25.9 % (ref 34.8–46.1)
HGB BLD-MCNC: 8.4 G/DL (ref 11.5–15.4)
HGB UR QL STRIP.AUTO: ABNORMAL
IMM GRANULOCYTES # BLD AUTO: 0.11 THOUSAND/UL (ref 0–0.2)
IMM GRANULOCYTES NFR BLD AUTO: 1 % (ref 0–2)
INTERVENTRICULAR SEPTUM IN DIASTOLE (PARASTERNAL SHORT AXIS VIEW): 1.4 CM (ref 0.5–0.94)
INTERVENTRICULAR SEPTUM: 1.4 CM (ref 0.6–1.1)
KETONES UR STRIP-MCNC: NEGATIVE MG/DL
LAAS-AP4: 26.5 CM2
LEFT ATRIUM SIZE: 3.6 CM
LEFT INTERNAL DIMENSION IN SYSTOLE: 2.5 CM (ref 2.42–3.67)
LEFT VENTRICULAR INTERNAL DIMENSION IN DIASTOLE: 4.2 CM (ref 3.95–5.88)
LEFT VENTRICULAR POSTERIOR WALL IN END DIASTOLE: 1.4 CM (ref 0.49–0.93)
LEFT VENTRICULAR STROKE VOLUME: 56 ML
LEUKOCYTE ESTERASE UR QL STRIP: ABNORMAL
LVSV (TEICH): 56 ML
LYMPHOCYTES # BLD AUTO: 0.68 THOUSANDS/ΜL (ref 0.6–4.47)
LYMPHOCYTES NFR BLD AUTO: 5 % (ref 14–44)
MAGNESIUM SERPL-MCNC: 1.9 MG/DL (ref 1.6–2.6)
MCH RBC QN AUTO: 28 PG (ref 26.8–34.3)
MCHC RBC AUTO-ENTMCNC: 32.4 G/DL (ref 31.4–37.4)
MCV RBC AUTO: 86 FL (ref 82–98)
MONOCYTES # BLD AUTO: 0.58 THOUSAND/ΜL (ref 0.17–1.22)
MONOCYTES NFR BLD AUTO: 4 % (ref 4–12)
MV E'TISSUE VEL-LAT: 6 CM/S
MV PEAK A VEL: 1.13 M/S
MV PEAK E VEL: 104 CM/S
NEUTROPHILS # BLD AUTO: 13.31 THOUSANDS/ΜL (ref 1.85–7.62)
NEUTS SEG NFR BLD AUTO: 90 % (ref 43–75)
NITRITE UR QL STRIP: POSITIVE
NON-SQ EPI CELLS URNS QL MICRO: ABNORMAL /HPF
NRBC BLD AUTO-RTO: 0 /100 WBCS
OTHER STN SPEC: ABNORMAL
P AXIS: 86 DEGREES
PH UR STRIP.AUTO: 6 [PH]
PLATELET # BLD AUTO: 186 THOUSANDS/UL (ref 149–390)
PMV BLD AUTO: 11 FL (ref 8.9–12.7)
POTASSIUM SERPL-SCNC: 3.7 MMOL/L (ref 3.5–5)
PR INTERVAL: 157 MS
PROCALCITONIN SERPL-MCNC: 10.37 NG/ML
PROT SERPL-MCNC: 6.3 G/DL (ref 6.4–8.3)
PROT UR STRIP-MCNC: ABNORMAL MG/DL
QRS AXIS: 92 DEGREES
QRSD INTERVAL: 119 MS
QT INTERVAL: 409 MS
QTC INTERVAL: 504 MS
RBC # BLD AUTO: 3 MILLION/UL (ref 3.81–5.12)
RBC #/AREA URNS AUTO: ABNORMAL /HPF
RIGHT ATRIAL 2D VOLUME: 23 ML
RIGHT ATRIUM AREA SYSTOLE A4C: 11 CM2
RIGHT VENTRICLE ID DIMENSION: 2.7 CM
SL CV AV DECELERATION TIME RETROGRADE: 1461 MS
SL CV AV PEAK GRADIENT RETROGRADE: 71 MMHG
SL CV LV EF: 60
SL CV PED ECHO LEFT VENTRICLE DIASTOLIC VOLUME (MOD BIPLANE) 2D: 78 ML
SL CV PED ECHO LEFT VENTRICLE SYSTOLIC VOLUME (MOD BIPLANE) 2D: 22 ML
SODIUM SERPL-SCNC: 135 MMOL/L (ref 133–145)
SP GR UR STRIP.AUTO: 1.01 (ref 1–1.03)
T WAVE AXIS: 67 DEGREES
UROBILINOGEN UR QL STRIP.AUTO: 0.2 E.U./DL
VENTRICULAR RATE: 91 BPM
WBC # BLD AUTO: 14.7 THOUSAND/UL (ref 4.31–10.16)
WBC #/AREA URNS AUTO: ABNORMAL /HPF
Z-SCORE OF INTERVENTRICULAR SEPTUM IN END DIASTOLE: 6.02
Z-SCORE OF LEFT VENTRICULAR DIMENSION IN END DIASTOLE: -1.37
Z-SCORE OF LEFT VENTRICULAR DIMENSION IN END SYSTOLE: -1.39
Z-SCORE OF LEFT VENTRICULAR POSTERIOR WALL IN END DIASTOLE: 6.13

## 2022-02-17 PROCEDURE — 93306 TTE W/DOPPLER COMPLETE: CPT | Performed by: INTERNAL MEDICINE

## 2022-02-17 PROCEDURE — 86140 C-REACTIVE PROTEIN: CPT

## 2022-02-17 PROCEDURE — 84145 PROCALCITONIN (PCT): CPT

## 2022-02-17 PROCEDURE — 93010 ELECTROCARDIOGRAM REPORT: CPT | Performed by: INTERNAL MEDICINE

## 2022-02-17 PROCEDURE — 81001 URINALYSIS AUTO W/SCOPE: CPT

## 2022-02-17 PROCEDURE — 83735 ASSAY OF MAGNESIUM: CPT

## 2022-02-17 PROCEDURE — 99223 1ST HOSP IP/OBS HIGH 75: CPT | Performed by: INTERNAL MEDICINE

## 2022-02-17 PROCEDURE — 85025 COMPLETE CBC W/AUTO DIFF WBC: CPT

## 2022-02-17 PROCEDURE — 80053 COMPREHEN METABOLIC PANEL: CPT

## 2022-02-17 PROCEDURE — 93306 TTE W/DOPPLER COMPLETE: CPT

## 2022-02-17 PROCEDURE — 87086 URINE CULTURE/COLONY COUNT: CPT

## 2022-02-17 PROCEDURE — 99232 SBSQ HOSP IP/OBS MODERATE 35: CPT | Performed by: PHYSICIAN ASSISTANT

## 2022-02-17 PROCEDURE — 85379 FIBRIN DEGRADATION QUANT: CPT

## 2022-02-17 RX ORDER — CEFAZOLIN SODIUM 1 G/50ML
1000 SOLUTION INTRAVENOUS EVERY 8 HOURS
Status: DISCONTINUED | OUTPATIENT
Start: 2022-02-17 | End: 2022-02-22 | Stop reason: HOSPADM

## 2022-02-17 RX ADMIN — Medication 125 MG: at 21:22

## 2022-02-17 RX ADMIN — CEFEPIME HYDROCHLORIDE 2000 MG: 2 INJECTION, SOLUTION INTRAVENOUS at 04:40

## 2022-02-17 RX ADMIN — BUPROPION 300 MG: 150 TABLET, EXTENDED RELEASE ORAL at 09:28

## 2022-02-17 RX ADMIN — Medication 125 MG: at 05:05

## 2022-02-17 RX ADMIN — Medication 250 MG: at 09:28

## 2022-02-17 RX ADMIN — BUSPIRONE HYDROCHLORIDE 15 MG: 5 TABLET ORAL at 09:28

## 2022-02-17 RX ADMIN — SODIUM CHLORIDE, SODIUM LACTATE, POTASSIUM CHLORIDE, AND CALCIUM CHLORIDE 75 ML/HR: .6; .31; .03; .02 INJECTION, SOLUTION INTRAVENOUS at 10:32

## 2022-02-17 RX ADMIN — FERROUS SULFATE TAB 325 MG (65 MG ELEMENTAL FE) 650 MG: 325 (65 FE) TAB at 09:28

## 2022-02-17 RX ADMIN — HEPARIN SODIUM 5000 UNITS: 5000 INJECTION INTRAVENOUS; SUBCUTANEOUS at 05:05

## 2022-02-17 RX ADMIN — BUSPIRONE HYDROCHLORIDE 15 MG: 5 TABLET ORAL at 18:05

## 2022-02-17 RX ADMIN — HEPARIN SODIUM 5000 UNITS: 5000 INJECTION INTRAVENOUS; SUBCUTANEOUS at 21:22

## 2022-02-17 RX ADMIN — MIDODRINE HYDROCHLORIDE 10 MG: 5 TABLET ORAL at 18:05

## 2022-02-17 RX ADMIN — Medication 125 MG: at 12:50

## 2022-02-17 RX ADMIN — Medication 1000 UNITS: at 09:29

## 2022-02-17 RX ADMIN — MIDODRINE HYDROCHLORIDE 10 MG: 5 TABLET ORAL at 09:28

## 2022-02-17 RX ADMIN — MIDODRINE HYDROCHLORIDE 10 MG: 5 TABLET ORAL at 12:50

## 2022-02-17 RX ADMIN — ESCITALOPRAM OXALATE 10 MG: 20 TABLET ORAL at 09:28

## 2022-02-17 RX ADMIN — ACETAMINOPHEN 650 MG: 325 TABLET, FILM COATED ORAL at 05:03

## 2022-02-17 RX ADMIN — Medication 125 MG: at 00:14

## 2022-02-17 RX ADMIN — HEPARIN SODIUM 5000 UNITS: 5000 INJECTION INTRAVENOUS; SUBCUTANEOUS at 14:54

## 2022-02-17 RX ADMIN — CEFAZOLIN SODIUM 1000 MG: 1 SOLUTION INTRAVENOUS at 18:05

## 2022-02-17 RX ADMIN — B-COMPLEX W/ C & FOLIC ACID TAB 1 TABLET: TAB at 09:28

## 2022-02-17 NOTE — PROGRESS NOTES
[facility]  Progress Note - Epimenio Marker 1950, 66 y o  female MRN: [id number]  Unit/Bed#: -01 Encounter: [id number]  Primary Care Provider: Jose Conroy MD   Date and time admitted to hospital: 2/16/2022 12:33 PM    * Severe sepsis [facility])  Assessment & Plan  · POA as evidenced by SIRS: Tachycardia ('s), Hypotension (SBP<95), Leukocytosis (WBC 23)  · Tachycardia resolved  · WBCs trending down  Currently: 14 70  · End organ Dysfunction: Acute kidney Injury (now resolved)   · Infection: Suspect worsening B/L LE cellulitis vs UTI vs Pneumonia  · Legs appear stable per patient  · CXR: negative  · UA (+) nitrites, RBCs, WBCs, bacteria  There is presence of epithelial cells as well  Urine culture: pending  · Fluids: S/P 1L NSS Bolus in the ED  Will c/w LR @ 75 ml/hr  · Will hold off from sepsis protocol Fluid resuscitation due to severe aortic stenosis and heart failure  · Monitor closely for signs of Volume overload   · ABX: S/P Clindamycin in the ED  Transitioned to IV Cefepime for now and await urine culture to alcides the antibiotics  · Lactic: Negative  · Procalcitonin elevated @ 10 37  Continue to trend  · CRP elevated @ 33 2  · CPK elevated @ 2600 3  · Cultures:  · BC x2: Pending  · U/A and U/C: Positive  Culture pending  · Infectious Disease Consulted and appreciate their input  Elevated d-dimer  Assessment & Plan  · POA  · D-Dimer: 5 57  Repeat D-Dimer: 3 99  · Vasc Duplex:  No evidence of acute or chronic DVTs bilaterally  No evidence of superficial thrombophlebitis noted bilaterally  · No acute complaints of shortness of breath, tachycardia or hypoxia  Therefore, hold off on CTA    Elevated troponin  Assessment & Plan  · Suspect in the setting of acute infection vs ACS  · HS Trop: 184--> 159 --> 179  · Patient denies any acute complaints of chest pain  · No findings on telemetry  Will discontinue  · ECHO: EF 60% with grade 1 diastolic dysfunction  Mild-to-moderate aortic regurgitation with critical stenosis  Monitor volume status closely  History of Clostridium difficile colitis  Assessment & Plan  · History of C-Diff infection  · Will initiate prophylactic PO Vancomycin in the setting of acute infection and antibiotic use    Ambulatory dysfunction  Assessment & Plan  · Pre-Syncope  · Patient with History of recurrent falls over the last 2 days  · CTH:  No acute intracranial abnormality  Small focus of encephalomalacia within the left occipital lobe which may represent an old infarct  · Xray RLE:  No acute osseous abnormality  · Xray LLE:  No acute osseous abnormality  · Initiate fall precautions   · Perform Orthostatic Blood pressures, starting tomorrow 2/17  · PT/OT evaluation: Pending    Severe aortic stenosis  Assessment & Plan  · History of severe aortic stenosis  · Previous Echo (5/2021): Ef 55% with Grade 1 diastolic dysfunction and Severe Aortic stenosis  · Repeat ECHO demonstrates EF 60% with critical AS  · Monitor I/O's and volume status  · Daily weights  Follow up with cardiology as an out-patient  Acute kidney injury ([facility] [address] )  Assessment & Plan  · POA  · Crt on admisison: 1 44  · Baseline Crt  Appears to be around 0 7-0 8 however in January 2021, creatinine was 1 22     · Creatinine today: 1 22  · Likely secondary to sepsis and poor oral intake  · S/P IV hydration  · Avoid/Limit Nephrotoxins  · Avoid hypotension and fluctuation and BP  · Continue monitor renal function    Idiopathic hypotension  Assessment & Plan  · Continue home medication:  · Midodrine 10 mg t i d   · Hold parameters for SBP >/= 130    Recurrent cellulitis of lower extremity  Assessment & Plan  · Patient with history of recurrent cellulitis  · Patient states she has wound nurses following at home with frequent dressing changes and reports her legs have been "stable"  · Wound Care Consulted and appreciate their recommendations    · Continue supportive care    Depression with anxiety  Assessment & Plan  · Continue Home Medications:  · Wellbutrin, BuSpar, and Lexapro  · EK with QT interval: 409      VTE Pharmacologic Prophylaxis: VTE Score: 5 High Risk (Score >/= 5) - Pharmacological DVT Prophylaxis Ordered: heparin  Sequential Compression Devices Ordered  Patient Centered Rounds: I performed bedside rounds with nursing staff today  Discussions with Specialists or Other Care Team Provider: Case management    Education and Discussions with Family / Patient: Patient declined call to   Patient states she spoke with her sister earlier and will keep her updated  Time Spent for Care: 30 minutes  More than 50% of total time spent on counseling and coordination of care as described above  Current Length of Stay: 1 day(s)  Current Patient Status: Inpatient   Certification Statement: The patient will continue to require additional inpatient hospital stay due to pending urine cultures and continued need for IV antibiotics  Discharge Plan: Anticipate discharge in 24-48 hrs to discharge location to be determined pending rehab evaluations  Code Status: Level 1 - Full Code    Subjective:   Patient states she is feeling stronger today than yesterday  Overall she feels as though she is improving  When I asked her about urinary complaints she did state that she had some vaginal itching and discomfort prior to her admission  That appears to since been resolved  Otherwise, no new complaints  She does state that her wound nurses have told her that her legs appear to be stable  Objective:     Vitals:   Temp (24hrs), Av 9 °F (36 6 °C), Min:96 5 °F (35 8 °C), Max:99 9 °F (37 7 °C)    Temp:  [96 5 °F (35 8 °C)-99 9 °F (37 7 °C)] 96 5 °F (35 8 °C)  HR:  [] 65  Resp:  [16-20] 18  BP: ()/(46-67) 96/60  SpO2:  [97 %-100 %] 100 %  Body mass index is 32 81 kg/m²  Input and Output Summary (last 24 hours):      Intake/Output Summary (Last 24 hours) at 2022 1441  Last data filed at 2/17/2022 0301  Gross per 24 hour   Intake 50 ml   Output 700 ml   Net -650 ml       Physical Exam:   Physical Exam  Vitals and nursing note reviewed  Constitutional:       General: She is not in acute distress  Appearance: She is well-developed  HENT:      Head: Normocephalic and atraumatic  Eyes:      Conjunctiva/sclera: Conjunctivae normal    Cardiovascular:      Rate and Rhythm: Normal rate and regular rhythm  Heart sounds: Murmur heard  Pulmonary:      Effort: Pulmonary effort is normal  No respiratory distress  Breath sounds: Normal breath sounds  Abdominal:      Palpations: Abdomen is soft  Tenderness: There is no abdominal tenderness  Musculoskeletal:      Cervical back: Neck supple  Right lower leg: Edema present  Left lower leg: Edema present  Skin:     General: Skin is warm and dry  Comments: Bilateral lower extremities are slightly erythematous with innumerable skin patches, none of which appear infected  Neurological:      Mental Status: She is alert         Additional Data:     Labs:  Results from last 7 days   Lab Units 02/17/22  0444   WBC Thousand/uL 14 70*   HEMOGLOBIN g/dL 8 4*   HEMATOCRIT % 25 9*   PLATELETS Thousands/uL 186   NEUTROS PCT % 90*   LYMPHS PCT % 5*   MONOS PCT % 4   EOS PCT % 0     Results from last 7 days   Lab Units 02/17/22  0444   SODIUM mmol/L 135   POTASSIUM mmol/L 3 7   CHLORIDE mmol/L 102   CO2 mmol/L 22   BUN mg/dL 31*   CREATININE mg/dL 1 22*   ANION GAP mmol/L 11   CALCIUM mg/dL 8 2*   ALBUMIN g/dL 2 6*   TOTAL BILIRUBIN mg/dL 0 36   ALK PHOS U/L 54 3   ALT U/L 20   AST U/L 53*   GLUCOSE RANDOM mg/dL 65                 Results from last 7 days   Lab Units 02/17/22  0444 02/16/22  1620   LACTIC ACID mmol/L  --  0 8   PROCALCITONIN ng/ml 10 37*  --        Lines/Drains:  Invasive Devices  Report    Peripheral Intravenous Line            Peripheral IV 02/16/22 Right Antecubital 1 day Telemetry:  Telemetry Orders (From admission, onward)             24 Hour Telemetry Monitoring  Continuous x 24 Hours (Telem)        Expiring   References:    Telemetry Guidelines   Question:  Reason for 24 Hour Telemetry  Answer:  Syncope suspected to be cardiac in origin                 Telemetry Reviewed: Normal Sinus Rhythm  Indication for Continued Telemetry Use: No indication for continued use  Will discontinue  Imaging: Reviewed radiology reports from this admission including: chest xray and CT head    Recent Cultures (last 7 days):   Results from last 7 days   Lab Units 02/16/22  1410   BLOOD CULTURE  Received in Microbiology Lab  Culture in Progress  Received in Microbiology Lab  Culture in Progress         Last 24 Hours Medication List:   Current Facility-Administered Medications   Medication Dose Route Frequency Provider Last Rate    acetaminophen  650 mg Oral Q6H PRN Lorrin PATRICE De PazNP      buPROPion  300 mg Oral Daily Lorrin ROSCOE De Paz      busPIRone  15 mg Oral BID Lorrin ROSCOE De Paz      cefepime  2,000 mg Intravenous Q12H Lorrin PATRICE De PazNP 2,000 mg (02/17/22 0440)    cholecalciferol  1,000 Units Oral Daily Lorrin ROSCOE De Paz      escitalopram  10 mg Oral Daily Lorrin ROSCOE De Paz      ferrous sulfate  650 mg Oral Daily With Breakfast Lorrin ROSCOE De Paz      heparin (porcine)  5,000 Units Subcutaneous [facility] LorROSCOE Crockett      lactated ringers  75 mL/hr Intravenous Continuous Lorrin PATRICE De PazNP 75 mL/hr (02/17/22 1032)    midodrine  10 mg Oral TID AC Lorrin PATRICE De PazNP      multivitamin stress formula  1 tablet Oral Daily Lorrin ROSCOE De Paz      ondansetron  4 mg Intravenous Q6H PRN Lorrin ROSCOE De Paz      saccharomyces boulardii  250 mg Oral Daily Lorrin KnowPATRICE careyNP      vancomycin  125 mg Oral Q12H [address]          Today, Patient Was Seen By: Jaymie Frye PA-C    **Please Note: This note may have been constructed using a voice recognition system  **

## 2022-02-17 NOTE — ASSESSMENT & PLAN NOTE
· History of C-Diff infection  · Will initiate prophylactic PO Vancomycin in the setting of acute infection and antibiotic use

## 2022-02-17 NOTE — PLAN OF CARE
Problem: PAIN - ADULT  Goal: Verbalizes/displays adequate comfort level or baseline comfort level  Description: Interventions:  - Encourage patient to monitor pain and request assistance  - Assess pain using appropriate pain scale  - Administer analgesics based on type and severity of pain and evaluate response  - Implement non-pharmacological measures as appropriate and evaluate response  - Consider cultural and social influences on pain and pain management  - Notify physician/advanced practitioner if interventions unsuccessful or patient reports new pain  Outcome: Progressing     Problem: INFECTION - ADULT  Goal: Absence or prevention of progression during hospitalization  Description: INTERVENTIONS:  - Assess and monitor for signs and symptoms of infection  - Monitor lab/diagnostic results  - Monitor all insertion sites, i e  indwelling lines, tubes, and drains  - Monitor endotracheal if appropriate and nasal secretions for changes in amount and color  - [place] appropriate cooling/warming therapies per order  - Administer medications as ordered  - Instruct and encourage patient and family to use good hand hygiene technique  - Identify and instruct in appropriate isolation precautions for identified infection/condition  Outcome: Progressing     Problem: SAFETY ADULT  Goal: Patient will remain free of falls  Description: INTERVENTIONS:  - Educate patient/family on patient safety including physical limitations  - Instruct patient to call for assistance with activity   - Consult OT/PT to assist with strengthening/mobility   - Keep Call bell within reach  - Keep bed low and locked with side rails adjusted as appropriate  - Keep care items and personal belongings within reach  - Initiate and maintain comfort rounds  - Make Fall Risk Sign visible to staff  - Offer Toileting every 2 Hours, in advance of need  - Initiate/Maintain alarm  - Obtain necessary fall risk management equipment:   - Apply yellow socks and bracelet for high fall risk patients  - Consider moving patient to room near nurses station  Outcome: Progressing  Goal: Maintain or return to baseline ADL function  Description: INTERVENTIONS:  -  Assess patient's ability to carry out ADLs; assess patient's baseline for ADL function and identify physical deficits which impact ability to perform ADLs (bathing, care of mouth/teeth, toileting, grooming, dressing, etc )  - Assess/evaluate cause of self-care deficits   - Assess range of motion  - Assess patient's mobility; develop plan if impaired  - Assess patient's need for assistive devices and provide as appropriate  - Encourage maximum independence but intervene and supervise when necessary  - Involve family in performance of ADLs  - Assess for home care needs following discharge   - Consider OT consult to assist with ADL evaluation and planning for discharge  - Provide patient education as appropriate  Outcome: Progressing  Goal: Maintains/Returns to pre admission functional level  Description: INTERVENTIONS:  - Perform BMAT or MOVE assessment daily    - Set and communicate daily mobility goal to care team and patient/family/caregiver  - Collaborate with rehabilitation services on mobility goals if consulted  - Perform Range of Motion 4 times a day  - Reposition patient every 2 hours    - Dangle patient 3 times a day  - Stand patient 3 times a day  - Ambulate patient 3 times a day  - Out of bed to chair 3 times a day   - Out of bed for meals 3 times a day  - Out of bed for toileting  - Record patient progress and toleration of activity level   Outcome: Progressing     Problem: DISCHARGE PLANNING  Goal: Discharge to home or other facility with appropriate resources  Description: INTERVENTIONS:  - Identify barriers to discharge w/patient and caregiver  - Arrange for needed discharge resources and transportation as appropriate  - Identify discharge learning needs (meds, wound care, etc )  - Arrange for interpretive services to assist at discharge as needed  - Refer to Case Management Department for coordinating discharge planning if the patient needs post-hospital services based on physician/advanced practitioner order or complex needs related to functional status, cognitive ability, or social support system  Outcome: Progressing     Problem: Knowledge Deficit  Goal: Patient/family/caregiver demonstrates understanding of disease process, treatment plan, medications, and discharge instructions  Description: Complete learning assessment and assess knowledge base  Interventions:  - Provide teaching at level of understanding  - Provide teaching via preferred learning methods  Outcome: Progressing     Problem: MOBILITY - ADULT  Goal: Maintain or return to baseline ADL function  Description: INTERVENTIONS:  -  Assess patient's ability to carry out ADLs; assess patient's baseline for ADL function and identify physical deficits which impact ability to perform ADLs (bathing, care of mouth/teeth, toileting, grooming, dressing, etc )  - Assess/evaluate cause of self-care deficits   - Assess range of motion  - Assess patient's mobility; develop plan if impaired  - Assess patient's need for assistive devices and provide as appropriate  - Encourage maximum independence but intervene and supervise when necessary  - Involve family in performance of ADLs  - Assess for home care needs following discharge   - Consider OT consult to assist with ADL evaluation and planning for discharge  - Provide patient education as appropriate  Outcome: Progressing  Goal: Maintains/Returns to pre admission functional level  Description: INTERVENTIONS:  - Perform BMAT or MOVE assessment daily    - Set and communicate daily mobility goal to care team and patient/family/caregiver  - Collaborate with rehabilitation services on mobility goals if consulted  - Perform Range of Motion 4 times a day  - Reposition patient every 2 hours    - Dangle patient 3 times a day  - Stand patient 3 times a day  - Ambulate patient 3 times a day  - Out of bed to chair 3 times a day   - Out of bed for meals 3 times a day  - Out of bed for toileting  - Record patient progress and toleration of activity level   Outcome: Progressing     Problem: Prexisting or High Potential for Compromised Skin Integrity  Goal: Skin integrity is maintained or improved  Description: INTERVENTIONS:  - Identify patients at risk for skin breakdown  - Assess and monitor skin integrity  - Assess and monitor nutrition and hydration status  - Monitor labs   - Assess for incontinence   - Turn and reposition patient  - Assist with mobility/ambulation  - Relieve pressure over bony prominences  - Avoid friction and shearing  - Provide appropriate hygiene as needed including keeping skin clean and dry  - Evaluate need for skin moisturizer/barrier cream  - Collaborate with interdisciplinary team   - Patient/family teaching  - Consider wound care consult   Outcome: Progressing

## 2022-02-17 NOTE — ASSESSMENT & PLAN NOTE
· POA as evidenced by SIRS: Tachycardia ('s), Hypotension (SBP<95), Leukocytosis (WBC 23)  · Tachycardia resolved  · WBCs trending down  Currently: 14 70  · End organ Dysfunction: Acute kidney Injury (now resolved)   · Infection: Suspect worsening B/L LE cellulitis vs UTI vs Pneumonia  · Legs appear stable per patient  · CXR: negative  · UA (+) nitrites, RBCs, WBCs, bacteria  There is presence of epithelial cells as well  Urine culture: pending  · Fluids: S/P 1L NSS Bolus in the ED  Will c/w LR @ 75 ml/hr  · Will hold off from sepsis protocol Fluid resuscitation due to severe aortic stenosis and heart failure  · Monitor closely for signs of Volume overload   · ABX: S/P Clindamycin in the ED  Transitioned to IV Cefepime for now and await urine culture to alcides the antibiotics  · Lactic: Negative  · Procalcitonin elevated @ 10 37  Continue to trend  · CRP elevated @ 33 2  · CPK elevated @ 2600 3  · Cultures:  · BC x2: Pending  · U/A and U/C: Positive  Culture pending  · Infectious Disease Consulted and appreciate their input

## 2022-02-17 NOTE — ASSESSMENT & PLAN NOTE
· Pre-Syncope  · Patient with History of recurrent falls over the last 2 days  · CTH:  No acute intracranial abnormality  Small focus of encephalomalacia within the left occipital lobe which may represent an old infarct    · Xray RLE:  No acute osseous abnormality  · Xray LLE:  No acute osseous abnormality  · Initiate fall precautions   · Perform Orthostatic Blood pressures, starting tomorrow 2/17  · PT/OT evaluation: Pending

## 2022-02-17 NOTE — ASSESSMENT & PLAN NOTE
· Patient with history of recurrent cellulitis  · Patient states she has wound nurses following at home with frequent dressing changes and reports her legs have been "stable"  · Wound Care Consulted and appreciate their recommendations    · Continue supportive care

## 2022-02-17 NOTE — ASSESSMENT & PLAN NOTE
· Suspect in the setting of acute infection vs ACS  · HS Trop: 184--> 159 --> 179  · Patient denies any acute complaints of chest pain  · No findings on telemetry  Will discontinue  · ECHO: EF 60% with grade 1 diastolic dysfunction  Mild-to-moderate aortic regurgitation with critical stenosis  Monitor volume status closely

## 2022-02-17 NOTE — ASSESSMENT & PLAN NOTE
· POA  · Crt on admisison: 1 44  · Baseline Crt   Appears to be around 0 7-0 8 however in January 2021, creatinine was 1 22     · Creatinine today: 1 22  · Likely secondary to sepsis and poor oral intake  · S/P IV hydration  · Avoid/Limit Nephrotoxins  · Avoid hypotension and fluctuation and BP  · Continue monitor renal function

## 2022-02-17 NOTE — ASSESSMENT & PLAN NOTE
· History of severe aortic stenosis  · Previous Echo (5/2021): Ef 55% with Grade 1 diastolic dysfunction and Severe Aortic stenosis  · Repeat ECHO demonstrates EF 60% with critical AS  · Monitor I/O's and volume status  · Daily weights  Follow up with cardiology as an out-patient

## 2022-02-17 NOTE — PLAN OF CARE
Problem: PAIN - ADULT  Goal: Verbalizes/displays adequate comfort level or baseline comfort level  Description: Interventions:  - Encourage patient to monitor pain and request assistance  - Assess pain using appropriate pain scale  - Administer analgesics based on type and severity of pain and evaluate response  - Implement non-pharmacological measures as appropriate and evaluate response  - Consider cultural and social influences on pain and pain management  - Notify physician/advanced practitioner if interventions unsuccessful or patient reports new pain  Outcome: Progressing     Problem: INFECTION - ADULT  Goal: Absence or prevention of progression during hospitalization  Description: INTERVENTIONS:  - Assess and monitor for signs and symptoms of infection  - Monitor lab/diagnostic results  - Monitor all insertion sites, i e  indwelling lines, tubes, and drains  - Monitor endotracheal if appropriate and nasal secretions for changes in amount and color  - [place] appropriate cooling/warming therapies per order  - Administer medications as ordered  - Instruct and encourage patient and family to use good hand hygiene technique  - Identify and instruct in appropriate isolation precautions for identified infection/condition  Outcome: Progressing  Goal: Absence of fever/infection during neutropenic period  Description: INTERVENTIONS:  - Monitor WBC    Outcome: Progressing     Problem: SAFETY ADULT  Goal: Patient will remain free of falls  Description: INTERVENTIONS:  - Educate patient/family on patient safety including physical limitations  - Instruct patient to call for assistance with activity   - Consult OT/PT to assist with strengthening/mobility   - Keep Call bell within reach  - Keep bed low and locked with side rails adjusted as appropriate  - Keep care items and personal belongings within reach  - Initiate and maintain comfort rounds  - Make Fall Risk Sign visible to staff  - Offer Toileting every 2 Hours, in advance of need  - Initiate/Maintain alarm  - Obtain necessary fall risk management equipment:   - Apply yellow socks and bracelet for high fall risk patients  - Consider moving patient to room near nurses station  Outcome: Progressing  Goal: Maintain or return to baseline ADL function  Description: INTERVENTIONS:  -  Assess patient's ability to carry out ADLs; assess patient's baseline for ADL function and identify physical deficits which impact ability to perform ADLs (bathing, care of mouth/teeth, toileting, grooming, dressing, etc )  - Assess/evaluate cause of self-care deficits   - Assess range of motion  - Assess patient's mobility; develop plan if impaired  - Assess patient's need for assistive devices and provide as appropriate  - Encourage maximum independence but intervene and supervise when necessary  - Involve family in performance of ADLs  - Assess for home care needs following discharge   - Consider OT consult to assist with ADL evaluation and planning for discharge  - Provide patient education as appropriate  Outcome: Progressing  Goal: Maintains/Returns to pre admission functional level  Description: INTERVENTIONS:  - Perform BMAT or MOVE assessment daily    - Set and communicate daily mobility goal to care team and patient/family/caregiver  - Collaborate with rehabilitation services on mobility goals if consulted  - Perform Range of Motion 3 times a day  - Reposition patient every 2 hours    - Dangle patient  times a day  - Stand patient  times a day  - Ambulate patient  times a day  - Out of bed to chair  times a day   - Out of bed for meals  times a day  - Out of bed for toileting  - Record patient progress and toleration of activity level   Outcome: Progressing     Problem: DISCHARGE PLANNING  Goal: Discharge to home or other facility with appropriate resources  Description: INTERVENTIONS:  - Identify barriers to discharge w/patient and caregiver  - Arrange for needed discharge resources and transportation as appropriate  - Identify discharge learning needs (meds, wound care, etc )  - Arrange for interpretive services to assist at discharge as needed  - Refer to Case Management Department for coordinating discharge planning if the patient needs post-hospital services based on physician/advanced practitioner order or complex needs related to functional status, cognitive ability, or social support system  Outcome: Progressing     Problem: Knowledge Deficit  Goal: Patient/family/caregiver demonstrates understanding of disease process, treatment plan, medications, and discharge instructions  Description: Complete learning assessment and assess knowledge base    Interventions:  - Provide teaching at level of understanding  - Provide teaching via preferred learning methods  Outcome: Progressing

## 2022-02-17 NOTE — CONSULTS
Consultation - Infectious Disease   Joel Molina 66 y o  female MRN: [id number]  Unit/Bed#: -01 Encounter: [id number]      IMPRESSION & RECOMMENDATIONS:   1  SIRS vs Sepsis  POA With tachycardia and leukocytosis  Patient also with soft blood pressure and AMADOU on admission  Patient does have underlying hypotension on chronic midodrine  Admission blood cultures are negative to date  Source appears to be #2    -continues IV cefepime 2g q 12 hours at present  -follow-up blood cultures and adjust antibiotics as needed below  -monitor temperature and hemodynamics  -serial exam   -recheck CBC and BMP in a m      2  Bilateral lower extremity cellulitis right greater than left  In setting of chronic LE wounds and lymphedema likely portal of entry  Patient does has have history of wound colonization in July 2020 with Pseudomonas, ESBL Klebsiella and Proteus  At this time, erythema in to right thigh is more clinically consistent with streptococcal origin   -antibiotics as above  -monitor temperature and hemodynamics  -serial exam  -follow-up wound RN consult local care  -maintain leg elevation as tolerated  -recheck CBC and BMP      3  AMADOU  POA Peak creatinine 1 45 > 1 22 with CrCl 36%  -renal dose adjust antibiotic as needed  -volume management   -recheck BMP     4  Prior C Diff 8/2020  No current diarrhea    -monitor stool output  -reduce Vancomycin to prophylactic dosing of 125 mg p o  Every 12 hours and continue till 72 hours after systemic antibiotics completed  5  Multiple antibiotic allergies including vancomycin IV, ciprofloxacin, cephalosporin, sulfa: patient reports that her reaction to most of these medications is rash/itching  She has tolerated cefepime during her previous hospitalization and PO cefpodoxime  -monitor antibiotic tolerance    6   History of depression, anxiety:  On bupropion, buspirone, escitalopram   -management per primary care team     Antibiotics:  Cefepime D2  PO Vancomycin D2    I have discussed the above management plan in detail with patient, RN, and the primary service    Extensive review of the medical records in epic including review of the notes, radiographs, and laboratory results     HISTORY OF PRESENT ILLNESS:  Reason for Consult:  Bilateral cellulitis of lower leg    HPI: Ina Flores is a 66y o  year old female with anxiety, depression, idiopathic hypotension, severe aortic stenosis, and recurring cellulitis who presented to the ER 2/16/22 with worsening lower extremity erythema, weakness and ambulatory dysfunction over the 2 days prior to admission resulting in 2 falls  Upon arrival to the ED patient was found to be tachycardic, hypotensive and leukocytosis and elevated creatinine  Patient received 1 L NSS bolus and IV clindamycin in the ED  Blood cultures were obtained and the patient was admitted on PO vancomycin and IV cefepime  Infectious Disease now being consulted regarding evaluation and management of bilateral cellulitis of lower legs  Patient does not recall leg weakness before  She denies any fever shaking chills at home  She does not recall being on pill antibiotics at home recently  She reports the [facility] visiting nurse has been applying Unna boots on Monday, Wednesday and Friday  She denies itching of the leg  Her 1st fall she landed backwards and her 2nd fall she landed on her knees  She denies any nausea, vomiting, or diarrhea at present  She denies any chest pain, shortness of breath, or cough  She denies any dysuria  Of note, patient was hospitalized in July 2020 and treated for cellulitis of b/l lower extremities  7/27 superficial wound cultures of her legs with growth of 4+ Pseudomonas aeruginosa, 4+ Proteus mirabilis, and 1+ ESBL Klebsiella pneumoniae  7/27 MRSA nares screen was negative   Upon hospital discharge, patient was completed a therapy with cefepime IV x 10 day course total     REVIEW OF SYSTEMS:  A complete review of systems is negative other than that noted in the HPI  PAST MEDICAL HISTORY:  Past Medical History:   Diagnosis Date    Anemia     Anxiety     Chronic back pain     Hyperkalemia     Hypertension     Hypotension     Lymphedema     BRANDYN (obstructive sleep apnea)     Psychiatric disorder     depression     Past Surgical History:   Procedure Laterality Date    APPENDECTOMY      GASTRIC BYPASS      obesity        FAMILY HISTORY:  Non-contributory    SOCIAL HISTORY:  Social History   Social History     Substance and Sexual Activity   Alcohol Use Not Currently    Comment: Alcohol intake:   None - As per [place]      Social History     Substance and Sexual Activity   Drug Use Never    Comment: Illicit drugs:   no - As per [place]      Social History     Tobacco Use   Smoking Status Never Smoker   Smokeless Tobacco Never Used   Tobacco Comment    Never smoker - As per [place]        ALLERGIES:  Allergies   Allergen Reactions    Aspirin     Cephalosporins      Has tolerated cefepime    Penicillins Other (See Comments)     Unknown reaction during childhood   Valium [Diazepam]     Ciprofloxacin Rash    Sulfa Antibiotics Rash     Itching, rash    Vancomycin Rash       MEDICATIONS:  All current active medications have been reviewed      PHYSICAL EXAM:  Temp:  [96 9 °F (36 1 °C)-99 9 °F (37 7 °C)] 96 9 °F (36 1 °C)  HR:  [] 72  Resp:  [16-20] 20  BP: ()/(46-67) 108/67  SpO2:  [97 %-100 %] 100 %  Temp (24hrs), Av 2 °F (36 8 °C), Min:96 9 °F (36 1 °C), Max:99 9 °F (37 7 °C)  Current: Temperature: (!) 96 9 °F (36 1 °C)    Intake/Output Summary (Last 24 hours) at 2022 1250  Last data filed at 2022 0301  Gross per 24 hour   Intake 50 ml   Output 700 ml   Net -650 ml       General Appearance:  75-year-old elderly female, appearing propped fairly comfortably in bed, nontoxic, and in no acute respiratory distress   Head:  Normocephalic, without obvious abnormality, atraumatic   Eyes:  Conjunctiva pink and sclera anicteric, both eyes   Nose: Nares normal, mucosa normal, no drainage   Throat: Oropharynx moist without lesions   Neck: Supple, symmetrical, no adenopathy, no tenderness/mass/nodules   Back:   Symmetric, no curvature, ROM normal, no CVA tenderness   Lungs:   Clear to auscultation bilaterally, respirations unlabored   Chest Wall:  No tenderness or deformity   Heart:  RRR; + loud systolic murmur   Abdomen:   Soft, non-tender, protuberant, positive bowel sounds    Extremities: No cyanosis, clubbing, erythema extending into right thigh as depicted below       Skin: No rashes or lesions  Arm IV site nontender   Lymph nodes: Cervical, supraclavicular nodes normal   Neurologic: Alert and oriented times 3, extremity strength 5/5 and symmetric       LABS, IMAGING, & OTHER STUDIES:  Lab Results:  I have personally reviewed pertinent labs  Results from last 7 days   Lab Units 02/17/22  0444 02/16/22  1410   WBC Thousand/uL 14 70* 24 60*   HEMOGLOBIN g/dL 8 4* 10 1*   PLATELETS Thousands/uL 186 215     Results from last 7 days   Lab Units 02/17/22  0444 02/16/22  1410 02/16/22  1410   SODIUM mmol/L 135  --  128*   POTASSIUM mmol/L 3 7   < > 3 6   CHLORIDE mmol/L 102   < > 93*   CO2 mmol/L 22   < > 23   BUN mg/dL 31*   < > 31*   CREATININE mg/dL 1 22*   < > 1 45*   EGFR ml/min/1 73sq m 44   < > 35   CALCIUM mg/dL 8 2*   < > 9 4   AST U/L 53*  --  64*   ALT U/L 20   < > 25   ALK PHOS U/L 54 3   < > 70 3    < > = values in this interval not displayed  Results from last 7 days   Lab Units 02/16/22  1410   BLOOD CULTURE  Received in Microbiology Lab  Culture in Progress  Received in Microbiology Lab  Culture in Progress       Results from last 7 days   Lab Units 02/17/22  0444   PROCALCITONIN ng/ml 10 37*     Results from last 7 days   Lab Units 02/17/22  0444   CRP mg/dL 33 2*         Results from last 7 days   Lab Units 02/17/22  0444 02/16/22  1410   D-DIMER QUANTITATIVE ug/ml FEU 3 99* 5 57*       Imaging Studies: Studies reviewed in PACS personally  02/17/2022 CT head:  No acute intracranial abnormality  02/17/2022 portable chest x-ray lungs clear  02/16/2022 bilateral knee x-rays:  Degenerative changes, no acute osseous abnormalities    Other Studies:   I have personally reviewed pertinent reports

## 2022-02-17 NOTE — ASSESSMENT & PLAN NOTE
· POA  · D-Dimer: 5 57  Repeat D-Dimer: 3 99  · Vasc Duplex:  No evidence of acute or chronic DVTs bilaterally  No evidence of superficial thrombophlebitis noted bilaterally  · No acute complaints of shortness of breath, tachycardia or hypoxia    Therefore, hold off on CTA

## 2022-02-18 LAB
ANION GAP SERPL CALCULATED.3IONS-SCNC: 9 MMOL/L (ref 4–13)
BACTERIA UR CULT: NORMAL
BASOPHILS # BLD AUTO: 0.02 THOUSANDS/ΜL (ref 0–0.1)
BASOPHILS NFR BLD AUTO: 0 % (ref 0–1)
BUN SERPL-MCNC: 29 MG/DL (ref 6–20)
CALCIUM SERPL-MCNC: 8.8 MG/DL (ref 8.4–10.2)
CHLORIDE SERPL-SCNC: 106 MMOL/L (ref 96–108)
CK MB SERPL-MCNC: 0.6 % (ref 0–2.5)
CK MB SERPL-MCNC: 2.9 NG/ML (ref 0.1–5.9)
CK SERPL-CCNC: 490.7 U/L (ref 38–234)
CO2 SERPL-SCNC: 22 MMOL/L (ref 22–33)
CREAT SERPL-MCNC: 1.06 MG/DL (ref 0.4–1.1)
EOSINOPHIL # BLD AUTO: 0.2 THOUSAND/ΜL (ref 0–0.61)
EOSINOPHIL NFR BLD AUTO: 2 % (ref 0–6)
ERYTHROCYTE [DISTWIDTH] IN BLOOD BY AUTOMATED COUNT: 15.6 % (ref 11.6–15.1)
GFR SERPL CREATININE-BSD FRML MDRD: 52 ML/MIN/1.73SQ M
GLUCOSE SERPL-MCNC: 73 MG/DL (ref 65–140)
HCT VFR BLD AUTO: 25.4 % (ref 34.8–46.1)
HGB BLD-MCNC: 7.8 G/DL (ref 11.5–15.4)
IMM GRANULOCYTES # BLD AUTO: 0.05 THOUSAND/UL (ref 0–0.2)
IMM GRANULOCYTES NFR BLD AUTO: 1 % (ref 0–2)
LYMPHOCYTES # BLD AUTO: 0.86 THOUSANDS/ΜL (ref 0.6–4.47)
LYMPHOCYTES NFR BLD AUTO: 9 % (ref 14–44)
MCH RBC QN AUTO: 27.2 PG (ref 26.8–34.3)
MCHC RBC AUTO-ENTMCNC: 30.7 G/DL (ref 31.4–37.4)
MCV RBC AUTO: 89 FL (ref 82–98)
MONOCYTES # BLD AUTO: 0.54 THOUSAND/ΜL (ref 0.17–1.22)
MONOCYTES NFR BLD AUTO: 6 % (ref 4–12)
MRSA NOSE QL CULT: ABNORMAL
MRSA NOSE QL CULT: ABNORMAL
NEUTROPHILS # BLD AUTO: 7.63 THOUSANDS/ΜL (ref 1.85–7.62)
NEUTS SEG NFR BLD AUTO: 82 % (ref 43–75)
NRBC BLD AUTO-RTO: 0 /100 WBCS
PLATELET # BLD AUTO: 186 THOUSANDS/UL (ref 149–390)
PMV BLD AUTO: 11.2 FL (ref 8.9–12.7)
POTASSIUM SERPL-SCNC: 3.7 MMOL/L (ref 3.5–5)
PROCALCITONIN SERPL-MCNC: 6.37 NG/ML
RBC # BLD AUTO: 2.87 MILLION/UL (ref 3.81–5.12)
SODIUM SERPL-SCNC: 137 MMOL/L (ref 133–145)
WBC # BLD AUTO: 9.3 THOUSAND/UL (ref 4.31–10.16)

## 2022-02-18 PROCEDURE — 97530 THERAPEUTIC ACTIVITIES: CPT

## 2022-02-18 PROCEDURE — 80048 BASIC METABOLIC PNL TOTAL CA: CPT | Performed by: PHYSICIAN ASSISTANT

## 2022-02-18 PROCEDURE — 82553 CREATINE MB FRACTION: CPT | Performed by: PHYSICIAN ASSISTANT

## 2022-02-18 PROCEDURE — 84145 PROCALCITONIN (PCT): CPT

## 2022-02-18 PROCEDURE — 99232 SBSQ HOSP IP/OBS MODERATE 35: CPT

## 2022-02-18 PROCEDURE — 97116 GAIT TRAINING THERAPY: CPT

## 2022-02-18 PROCEDURE — 97163 PT EVAL HIGH COMPLEX 45 MIN: CPT

## 2022-02-18 PROCEDURE — 82550 ASSAY OF CK (CPK): CPT | Performed by: PHYSICIAN ASSISTANT

## 2022-02-18 PROCEDURE — 99232 SBSQ HOSP IP/OBS MODERATE 35: CPT | Performed by: INTERNAL MEDICINE

## 2022-02-18 PROCEDURE — 97167 OT EVAL HIGH COMPLEX 60 MIN: CPT

## 2022-02-18 PROCEDURE — 85025 COMPLETE CBC W/AUTO DIFF WBC: CPT | Performed by: PHYSICIAN ASSISTANT

## 2022-02-18 RX ADMIN — BUPROPION 300 MG: 150 TABLET, EXTENDED RELEASE ORAL at 10:12

## 2022-02-18 RX ADMIN — Medication 250 MG: at 10:12

## 2022-02-18 RX ADMIN — BUSPIRONE HYDROCHLORIDE 15 MG: 5 TABLET ORAL at 10:12

## 2022-02-18 RX ADMIN — B-COMPLEX W/ C & FOLIC ACID TAB 1 TABLET: TAB at 10:12

## 2022-02-18 RX ADMIN — ESCITALOPRAM OXALATE 10 MG: 20 TABLET ORAL at 10:12

## 2022-02-18 RX ADMIN — MIDODRINE HYDROCHLORIDE 10 MG: 5 TABLET ORAL at 17:26

## 2022-02-18 RX ADMIN — CEFAZOLIN SODIUM 1000 MG: 1 SOLUTION INTRAVENOUS at 17:26

## 2022-02-18 RX ADMIN — Medication 125 MG: at 10:56

## 2022-02-18 RX ADMIN — SODIUM CHLORIDE, SODIUM LACTATE, POTASSIUM CHLORIDE, AND CALCIUM CHLORIDE 75 ML/HR: .6; .31; .03; .02 INJECTION, SOLUTION INTRAVENOUS at 10:18

## 2022-02-18 RX ADMIN — MIDODRINE HYDROCHLORIDE 10 MG: 5 TABLET ORAL at 10:57

## 2022-02-18 RX ADMIN — ACETAMINOPHEN 650 MG: 325 TABLET, FILM COATED ORAL at 00:48

## 2022-02-18 RX ADMIN — BUSPIRONE HYDROCHLORIDE 15 MG: 5 TABLET ORAL at 17:26

## 2022-02-18 RX ADMIN — CEFAZOLIN SODIUM 1000 MG: 1 SOLUTION INTRAVENOUS at 10:12

## 2022-02-18 RX ADMIN — ACETAMINOPHEN 650 MG: 325 TABLET, FILM COATED ORAL at 20:09

## 2022-02-18 RX ADMIN — Medication 1000 UNITS: at 10:12

## 2022-02-18 RX ADMIN — CEFAZOLIN SODIUM 1000 MG: 1 SOLUTION INTRAVENOUS at 02:00

## 2022-02-18 RX ADMIN — ACETAMINOPHEN 650 MG: 325 TABLET, FILM COATED ORAL at 10:30

## 2022-02-18 RX ADMIN — Medication 125 MG: at 20:09

## 2022-02-18 RX ADMIN — HEPARIN SODIUM 5000 UNITS: 5000 INJECTION INTRAVENOUS; SUBCUTANEOUS at 22:11

## 2022-02-18 RX ADMIN — FERROUS SULFATE TAB 325 MG (65 MG ELEMENTAL FE) 650 MG: 325 (65 FE) TAB at 06:34

## 2022-02-18 RX ADMIN — HEPARIN SODIUM 5000 UNITS: 5000 INJECTION INTRAVENOUS; SUBCUTANEOUS at 17:26

## 2022-02-18 RX ADMIN — HEPARIN SODIUM 5000 UNITS: 5000 INJECTION INTRAVENOUS; SUBCUTANEOUS at 06:34

## 2022-02-18 RX ADMIN — MIDODRINE HYDROCHLORIDE 10 MG: 5 TABLET ORAL at 06:34

## 2022-02-18 NOTE — ASSESSMENT & PLAN NOTE
· History of C-Diff infection  · Continue prophylactic PO Vancomycin in the setting of acute infection and antibiotic use

## 2022-02-18 NOTE — PROGRESS NOTES
Progress Note - Infectious Disease   Kasi Francois 66 y o  female MRN: [id number]  Unit/Bed#: -01 Encounter: [id number]    Impression/Recommendations:  1  SIRS, POA:  With tachycardia, leukocytosis  Patient was mildly hypotensive but this has improved  Consider lower extremity cellulitis as a possible source  Admission blood cultures are preliminarily negative  WBC has quickly normalized and procalcitonin is trending downward  · Follow blood cultures  · Antibiotic therapy as stated below  · Supportive care per primary team    2  Cellulitis bilateral lower extremities: In the setting of chronic lower extremity wounds and lymphedema  This is the presumed source of SIRS  Noted prior wound cultures from July of 2020 had polymicrobial growth including Pseudomonas, ESBL Klebsiella and Proteus  Patient remains afebrile and WBC is decreasing  · Follow blood cultures  · Continue cefazolin 1g IV q 8 hours as I suspect streptococcal infection  · Upon discharge, transition to cephalexin 500 mg p o  Q 6 hours to complete 7 day course total thru Feb 22nd  · Wound care service is following- their recommendations are noted  · Lower extremity elevation advised as tolerated  · Monitor clinical response, temperature curve  3  AMADOU:  Creatinine continues to improve and is approaching baseline  · Renal dose medications, avoid nephrotoxins    4  History of C difficile colitis in Aug 2020  · Continue vancomycin oral 125mg po q12 hours for C diff prophylaxis thru Feb 25th  5  Multiple antibiotic allergies are noted but patient has previously tolerated cephalexin, cefpodoxime and cefepime  My recommendations were discussed with the patient who verbalized understanding  My recommendations were discussed with BEBO Tavera from the primary service via secure text  ID service will formally re-evaluate this patient on Monday  Please contact ID attending on call with questions this weekend as needed  Antibiotics: cefazolin D2, vanco po D2, systemic ABx D3  Scheduled Meds:  Current Facility-Administered Medications   Medication Dose Route Frequency Provider Last Rate    acetaminophen  650 mg Oral Q6H PRN ROSCOE Powell      buPROPion  300 mg Oral Daily ROSCOE Powell      busPIRone  15 mg Oral BID ROSCOE Powell      cefazolin  1,000 mg Intravenous Q8H Joe Borges, DO 1,000 mg (22 0200)    cholecalciferol  1,000 Units Oral Daily ROSCOE Powell      escitalopram  10 mg Oral Daily ROSCOE Powell      ferrous sulfate  650 mg Oral Daily With Breakfast ROSCOE Powell      heparin (porcine)  5,000 Units Subcutaneous [facility] ROSCOE Powell      lactated ringers  75 mL/hr Intravenous Continuous ROSCOE Powell 75 mL/hr (22 1032)    midodrine  10 mg Oral TID AC ROSCOE Powell      multivitamin stress formula  1 tablet Oral Daily ROSCOE Powell      ondansetron  4 mg Intravenous Q6H PRN ROSCOE Powell      saccharomyces boulardii  250 mg Oral Daily ROSCOE Powell      vancomycin  125 mg Oral Q12H [address] Debby Shore PA-C       Continuous Infusions:lactated ringers, 75 mL/hr, Last Rate: 75 mL/hr (22 1032)      PRN Meds:   acetaminophen    ondansetron    Subjective:  Patient reports pain from her leg wounds for which she took Tylenol  She denies fever, chills, vomiting, diarrhea, shortness of breath, itching  No new symptoms      Objective:  Vitals:  Temp:  [96 5 °F (35 8 °C)-98 4 °F (36 9 °C)] 96 6 °F (35 9 °C)  HR:  [65-92] 72  Resp:  [18-20] 18  BP: ()/(53-67) 105/53  SpO2:  [97 %-100 %] 99 %  Temp (24hrs), Av 1 °F (36 2 °C), Min:96 5 °F (35 8 °C), Max:98 4 °F (36 9 °C)  Current: Temperature: (!) 96 6 °F (35 9 °C)  Physical Exam:   General Appearance:  Alert, awake, nontoxic, no acute distress   ENT: Oropharynx moist without lesions   Lungs:   Clear to auscultation bilaterally; respirations unlabored   Heart:  S1, S2, RRR, 3/6 systolic murmur heard throughout precordium   Abdomen:   Soft, non-tender, non-distended   : Purewick catheter present draining urine   Extremities: Bilateral lower extremity lymphedema  Neurologic: AAO to person, surroundings, conversant, fluent speech   Skin: Multiple dry scabs of lower extremities Venous stasis changes noted but no significant erythema or tenderness to palpation  No purulent drainage is noted  Labs, Cultures, Imaging, & Other studies:   All pertinent labs, cultures and imaging studies were personally reviewed  Results from last 7 days   Lab Units 02/18/22  0612 02/17/22  0444 02/16/22  1410   WBC Thousand/uL 9 30 14 70* 24 60*   HEMOGLOBIN g/dL 7 8* 8 4* 10 1*   PLATELETS Thousands/uL 186 186 215     Results from last 7 days   Lab Units 02/18/22  0612 02/17/22 0444 02/17/22  0444 02/16/22  1410 02/16/22  1410   SODIUM mmol/L 137  --  135  --  128*   POTASSIUM mmol/L 3 7   < > 3 7   < > 3 6   CHLORIDE mmol/L 106   < > 102   < > 93*   CO2 mmol/L 22   < > 22   < > 23   BUN mg/dL 29*   < > 31*   < > 31*   CREATININE mg/dL 1 06   < > 1 22*   < > 1 45*   EGFR ml/min/1 73sq m 52   < > 44   < > 35   CALCIUM mg/dL 8 8   < > 8 2*   < > 9 4   AST U/L  --   --  53*  --  64*   ALT U/L  --   --  20   < > 25   ALK PHOS U/L  --   --  54 3   < > 70 3    < > = values in this interval not displayed  Results from last 7 days   Lab Units 02/16/22  1410   BLOOD CULTURE  No Growth at 24 hrs  No Growth at 24 hrs       Results from last 7 days   Lab Units 02/18/22  0612 02/17/22  0444   PROCALCITONIN ng/ml 6 37* 10 37*     Results from last 7 days   Lab Units 02/17/22  0444   CRP mg/dL 33 2*         Results from last 7 days   Lab Units 02/17/22  0444 02/16/22  1410   D-DIMER QUANTITATIVE ug/ml FEU 3 99* 5 57*

## 2022-02-18 NOTE — PLAN OF CARE
Problem: PHYSICAL THERAPY ADULT  Goal: Performs mobility at highest level of function for planned discharge setting  See evaluation for individualized goals  Description: Treatment/Interventions: Functional transfer training,LE strengthening/ROM,Therapeutic exercise,Endurance training,Patient/family training,Bed mobility,Gait training,Spoke to nursing,Spoke to case management,OT,Equipment eval/education     See flowsheet documentation for full assessment, interventions and recommendations  Outcome: Progressing  Note: Prognosis: Fair  Problem List: Decreased strength,Decreased range of motion,Decreased endurance,Impaired balance,Decreased mobility,Decreased safety awareness,Obesity,Decreased skin integrity,Pain  Assessment: Pt seen for PT evaluation, cleared by GEO Cobb  PT consult received for mobility assessment & discharge needs  Pt admitted 2/16/2022 w/ B LE cellulitis, x2 falls with head strike, dx Severe sepsis ([facility] [address] )  Comorbidities affecting pt's fnxl performance include: anemia, anxiety, chronic back pain, falls, hypertension, gastric bypass, depression, lymphedema  Personal factors affecting pt include: ambulating with assistive device, inability to ambulate household distances, limited home support, positive fall history, anxiety, limited insight into impairments, inability to perform ADLS and inability to live alone  PTA, pt was independent with functional mobility with RW for 2-3 room distances, requiring assist for ADLS, requiring assist for IADLS and living alone in 1 story home with ramp/0 steps to enter  Pt demonstrates fnxl impairments identified in objective findings from Elderly Mobility Scale assessment, scoring 1/20, indicating near full dependence on others for completion of ADLs and mobility  AM-PAC objective tool in combination w/ Barthel Index outcome tool were used to assist in determining pt safety w/ mobility/ADLs & appropriate d/c recommendations, see above for scores   Pt is at risk of falls d/t multiple comorbidities, h/o falls, impaired balance, use of ambulatory aid, varying levels of pain , acuity of medical illness and ongoing medical treatment of primary dx  Pt's clinical presentation is currently unstable/unpredictable seen in pt's presentation of changing level of pain, increased fall risk, new onset of impairment of functional mobility, decreased endurance and new onset of weakness  PT IE requires high complexity clinical decision making, based on multiple factors which affect pt's performance w/ evaluation & therapist judgement for d/c recommendations  Pt will benefit from continued PT treatment in order to address impairments, decrease risk of falls, maximize independence w/ fnxl mobility, & ensure safety w/ mobility for transition to next level of care  Based on pt presentation & impairments, pt would most appropriately benefit from post acute STR  Barriers to Discharge: Decreased caregiver support     PT Discharge Recommendation: Post acute rehabilitation services     See flowsheet documentation for full assessment

## 2022-02-18 NOTE — OCCUPATIONAL THERAPY NOTE
Occupational Therapy Evaluation      Demetrius Childs    2/18/2022    Patient Active Problem List   Diagnosis    Severe sepsis ([facility] [address] )    Depression with anxiety    Recurrent cellulitis of lower extremity    Idiopathic hypotension    Depression    Anemia    Edema    Vitamin D deficiency    Vitamin B12 deficiency    Lymphedema    Chronic venous hypertension (idiopathic) with ulcer of bilateral lower extremity ([facility] [address] )    [facility]    Severe aortic stenosis    Ambulatory dysfunction    History of Clostridium difficile colitis    Chronic back pain    Allergy to multiple antibiotics    Elevated troponin    Elevated d-dimer       Past Medical History:   Diagnosis Date    Anemia     Anxiety     Chronic back pain     Hyperkalemia     Hypertension     Hypotension     Lymphedema     BRANDYN (obstructive sleep apnea)     Psychiatric disorder     depression       Past Surgical History:   Procedure Laterality Date    APPENDECTOMY      GASTRIC BYPASS      obesity         02/18/22 1259   OT Last Visit   OT Visit Date 02/18/22   Note Type   Note type Evaluation   Restrictions/Precautions   Weight Bearing Precautions Per Order No   Other Precautions Bed Alarm; Chair Alarm;Multiple lines; Fall Risk;Pain   Pain Assessment   Pain Assessment Tool 0-10   Pain Score 7   Pain Location/Orientation Orientation: Bilateral;Location: Knee  (R>L )   Pain Radiating Towards localized    Pain Onset/Description Onset: Ongoing;Frequency: Constant/Continuous; Descriptor: Aching;Descriptor: Sore   Effect of Pain on Daily Activities limits WB tolerance, activity tolerance, and bed mobility    Hospital Pain Intervention(s) Repositioned; Ambulation/increased activity; Emotional support   Multiple Pain Sites Yes   Pain 2   Pain Location/Orientation 2 Location: Head  (at site of headstrike )   Home Living   Type of [address] [place] [address] One level;Performs ADLs on one level; Able to live on main level with bedroom/bathroom; Ramped entrance   Bathroom Shower/Tub   (spongebathes at baseline )   [employer]   (not accessible )   [address]  (BSC available , does not use )   Bathroom Accessibility Not accessible  (RW and WC do not fit in bathroom per pt )   [address]; Other (Comment)  (lift recliner chair, RW used at baseline )   Additional Comments Pt sleepts in lift recliner chair  Uses diapers at baseline and manages addi care (I)  Pt has BSC, does not use d/t hip pain  Reports washing up in kitchen at sink d/t inaccessable bathroom    Prior Function   Level of [place] Needs assistance with IADLs; Needs assistance with ADLs and functional mobility   Lives With Alone   Receives Help From Family  (sister comes over daily, Home health VNA )   ADL Assistance Needs assistance   IADLs Needs assistance   Falls in the last 6 months 1 to 4  (2 falls just PTA *reason for hospitalization* )   Comments Pt reports ambulating short distances within room Mod I c RW at baseline    Lifestyle   Autonomy Pt reports needing assistance with ADL/IADLs at baseline, lives alone in 1 story home with ramped entrance, and uses RW at baseline  (-) driving     Reciprocal Relationships sister   Service to Others retired    Intrinsic Gratification enjoys 46s music and "late night TV"    ADL   Eating Assistance 5  Supervision/Setup   Grooming Assistance 5  Supervision/Setup   Grooming Deficit Setup;Supervision/safety  (grooming/oral care completed with set up )   [address] 5  Supervision/Setup   LB [address] 3  Moderate Assistance   UB Dressing Assistance 5  Supervision/Setup   LB Dressing Assistance 2  Maximal [address]  2  Maximal [address] 4  Minimal Assistance   Additional Comments Pt limited by decreased activity tolerance, generalized weakness, pain, and decreased trunk control      Bed Mobility   Rolling R 2  Maximal assistance   Additional items Assist x 1; Increased time required;LE management; Bedrails  (trunk management )   Rolling L 2  Maximal assistance   Additional items Assist x 1; Increased time required;LE management; Bedrails;Verbal cues  (trunk management )   Supine to Sit 2  Maximal assistance   Additional items Assist x 2;Bedrails;HOB elevated;Verbal cues;LE management; Increased time required   Sit to Supine 3  Moderate assistance   Additional items Assist x 2;Verbal cues;LE management; Increased time required   Additional Comments Pt tolerated sitting EOB ~2 minutes with BUE self supported on RW  Denied lightheaded/dizziness c positional changes    Transfers   Sit to Stand 4  Minimal assistance   Additional items Assist x 2; Increased time required;Verbal cues  (RW)   Stand to Sit 4  Minimal assistance   Additional items Assist x 2; Increased time required;Verbal cues  (RW)   Additional Comments VC for proper hand placement during transfers  Functional Mobility   Functional Mobility 4  Minimal assistance  (x2)   Additional Comments Short distance functional mobility at EOB  Pt took ~5 steps forward and back, and marched in place  Increased difficulty clearing R foot from ground, increased pain reported  R knee noted to buckle  Additional items Rolling walker   Balance   Static Sitting Fair   Dynamic Sitting Fair -   Static Standing Fair   Dynamic Standing Poor   Activity Tolerance   Activity Tolerance Patient limited by fatigue;Patient limited by pain   Medical Staff Made Aware CM, PT    Nurse Made Aware RN [address] pre/post session  RUE Assessment   RUE Assessment WFL   RUE Strength   RUE Overall Strength Within Functional Limits - able to perform ADL tasks with strength; Deficits  (MMT ~4-/5 based on functional assessment)   LUE Assessment   LUE Assessment WFL   LUE Strength   LUE Overall Strength Deficits; Within Functional Limits - able to perform ADL tasks with strength  (MMT ~4-/5 based on functional assessment )   Hand Function Gross Motor Coordination Functional   Fine Motor Coordination Functional   Vision-Basic Assessment   Current Vision Wears glasses all the time  (reports needing new Rx )   Vision - Complex Assessment   Acuity Able to read clock/calendar on wall without difficulty; Able to read employee name badge without difficulty   Cognition   Overall Cognitive Status [facility]   Arousal/Participation Alert; Cooperative   Attention Within functional limits   Orientation Level Oriented X4   Memory Within functional limits   Following Commands Follows one step commands with increased time or repetition   Comments Pt agreeable to OT session, pleasant    Assessment   Limitation Decreased ADL status; Decreased UE ROM; Decreased UE strength;Decreased Safe judgement during ADL;Decreased endurance;Decreased self-care trans;Decreased high-level ADLs   Prognosis Good   Assessment Patient is a 66 y o  female seen for OT evaluation at [address] [place] [address] following admission on 2/16/2022  s/p Severe sepsis ([facility] [address] )  Comorbidities impacting functional performance include: depression with anxiety, recurrnt cellulitis, idiopathic HTN, AMADOU, severe aortic stenosis, ambulatory dysfunction, chronic back pain  Patient presents with active orders for OT eval and treat and Up with assistance   Performed at least 2 patient identifiers during session including name and wristband  Pt reports needing assistance with ADL/IADLs at baseline, lives alone in 1 story home with ramped entrance, and uses RW at baseline  (-) driving  Upon initial evaluation, patient requires supervision for UB ADLs, max assist for LB ADLs, and min assist x2 for transfers and functional mobility within room and bathroom with the use of with RW  Based on functional eval, patient presents A&Ox4 with intact  attention, intact  safety awareness, and intact  short term and long term memory   Occupational performance is affected by the following deficits: decreased muscular strength , decreased standing tolerance for self care tasks , decreased dynamic balance impacting functional reach, decreased trunk control , decreased activity tolerance  and (+) pain  Based on these findings, functional performance deficits, and assessment findings, pt has been identified as a high complexity evaluation  Personal factors impacting performance at the time of evaluation include: decreased (+) Hx of falls , decreased ADL independence , decreased IADL independence and High fall risk   Patient would benefit from OT services to maximize level of functional independence and safety in self-care and transfers  Occupational areas to address include:bathing/showering, toileting, dressing , eating , personal hygiene/grooming , bed mobility , functional mobility, transfer to all surfaces, household management, fall prevention  and energy conservation   From OT standpoint, recommendation at time of D/C would be post-acute rehabilitation   Goals   Patient Goals to be able to walk within her home better    Plan   Treatment Interventions ADL retraining;Functional transfer training;Neuromuscular reeducation;Patient/family training; Endurance training;Energy conservation   Goal Expiration Date 02/28/22   OT Treatment Day 0   OT Frequency 2-3x/wk   Recommendation   OT Discharge Recommendation Post acute rehabilitation services  (Pt agreeable, CM made aware)   OT - OK to Discharge Yes  (Once medically clear )   Additional Comments  Pt resting in bed at end of session c grooming care set up, all needs met and bed alarm activated  Additional Comments 2 The patient's raw score on the AM-PAC Daily Activity inpatient short form is 12, standardized score is 30 6, less than 39 4  Patients at this level are likely to benefit from discharge to post-acute rehabilitation services  Please refer to the recommendation of the Occupational Therapist for safe discharge planning     AM-PAC Daily Activity Inpatient   Lower Body Dressing 1   Bathing 2 Toileting 1   Upper Body Dressing 2   Grooming 3   Eating 3   Daily Activity Raw Score 12   Daily Activity Standardized Score (Calc for Raw Score >=11) 30 6   AM-PAC Applied Cognition Inpatient   Following a Speech/Presentation 3   Understanding Ordinary Conversation 3   Taking Medications 3   Remembering Where Things Are Placed or Put Away 3   Remembering List of 4-5 Errands 3   Taking Care of Complicated Tasks 3   Applied Cognition Raw Score 18   Applied Cognition Standardized Score 38 07   Pt will complete UB ADLs Independent  with use of AE as needed for increased ADL independence within 10 days  Pt will complete LB ADLs Min A  with use of AE as needed for increased ADL independence within 10 days  Pt will complete toileting Min A  with use of DME for increased ADL independence within 10 days  Pt will demonstrate proper body mechanics to complete transfers and functional mobility with Min A x 1 and use of AD for increased safety and functional mobility within 10 days  Pt will demonstrate standing tolerance of 6 min with Min A x1 and use of AD for increased endurance and activity tolerance within 10 days  Pt will demonstrate OOB sitting tolerance of 2-4 hr/day for increased activity tolerance and engagement within 10 days  Pt will participate in 10 min of BUE therapeutic exercise to increase strength, ROM, and endurance during ADL/IADLs within 10 days  Pt will participate in ongoing cognitive assessments to assist with safe D/C planning and recommendations  Pt will demonstrate proper body mechanics and fall prevention strategies during 100% of tx sessions for increased safety awareness during ADL/IADLs    Pt will verbalize and demonstrate understanding of energy conservation/deep breathing techniques for increased activity tolerance and endurance during meaningful activities       Carla Johnson, OT

## 2022-02-18 NOTE — ASSESSMENT & PLAN NOTE
· POA  · Crt on admisison: 1 44  · Baseline Crt   Appears to be around 0 7-0 8 however in January 2021, creatinine was 1 22     · Creatinine today: 1 06  · Likely secondary to sepsis and poor oral intake  · S/P IV hydration  · Avoid/Limit Nephrotoxins  · Avoid hypotension and fluctuation and BP  · Continue monitor renal function

## 2022-02-18 NOTE — PLAN OF CARE
Problem: PAIN - ADULT  Goal: Verbalizes/displays adequate comfort level or baseline comfort level  Description: Interventions:  - Encourage patient to monitor pain and request assistance  - Assess pain using appropriate pain scale  - Administer analgesics based on type and severity of pain and evaluate response  - Implement non-pharmacological measures as appropriate and evaluate response  - Consider cultural and social influences on pain and pain management  - Notify physician/advanced practitioner if interventions unsuccessful or patient reports new pain  Outcome: Progressing     Problem: INFECTION - ADULT  Goal: Absence or prevention of progression during hospitalization  Description: INTERVENTIONS:  - Assess and monitor for signs and symptoms of infection  - Monitor lab/diagnostic results  - Monitor all insertion sites, i e  indwelling lines, tubes, and drains  - Monitor endotracheal if appropriate and nasal secretions for changes in amount and color  - Otto appropriate cooling/warming therapies per order  - Administer medications as ordered  - Instruct and encourage patient and family to use good hand hygiene technique  - Identify and instruct in appropriate isolation precautions for identified infection/condition  Outcome: Progressing  Goal: Absence of fever/infection during neutropenic period  Description: INTERVENTIONS:  - Monitor WBC    Outcome: Progressing     Problem: SAFETY ADULT  Goal: Patient will remain free of falls  Description: INTERVENTIONS:  - Educate patient/family on patient safety including physical limitations  - Instruct patient to call for assistance with activity   - Consult OT/PT to assist with strengthening/mobility   - Keep Call bell within reach  - Keep bed low and locked with side rails adjusted as appropriate  - Keep care items and personal belongings within reach  - Initiate and maintain comfort rounds  - Make Fall Risk Sign visible to staff  - Offer Toileting every 2 Hours, in advance of need  - Initiate/Maintain alarm  - Obtain necessary fall risk management equipment:  - Apply yellow socks and bracelet for high fall risk patients  - Consider moving patient to room near nurses station  Outcome: Progressing  Goal: Maintain or return to baseline ADL function  Description: INTERVENTIONS:  -  Assess patient's ability to carry out ADLs; assess patient's baseline for ADL function and identify physical deficits which impact ability to perform ADLs (bathing, care of mouth/teeth, toileting, grooming, dressing, etc )  - Assess/evaluate cause of self-care deficits   - Assess range of motion  - Assess patient's mobility; develop plan if impaired  - Assess patient's need for assistive devices and provide as appropriate  - Encourage maximum independence but intervene and supervise when necessary  - Involve family in performance of ADLs  - Assess for home care needs following discharge   - Consider OT consult to assist with ADL evaluation and planning for discharge  - Provide patient education as appropriate  Outcome: Progressing  Goal: Maintains/Returns to pre admission functional level  Description: INTERVENTIONS:  - Perform BMAT or MOVE assessment daily    - Set and communicate daily mobility goal to care team and patient/family/caregiver  - Collaborate with rehabilitation services on mobility goals if consulted  - Perform Range of Motion 3 times a day  - Reposition patient every 2 hours    - Dangle patient 2 times a day  - Stand patient 3 times a day  - Ambulate patient 4 times a day  - Out of bed to chair 2 times a day   - Out of bed for meals 3 times a day  - Out of bed for toileting  - Record patient progress and toleration of activity level   Outcome: Progressing     Problem: DISCHARGE PLANNING  Goal: Discharge to home or other facility with appropriate resources  Description: INTERVENTIONS:  - Identify barriers to discharge w/patient and caregiver  - Arrange for needed discharge resources and transportation as appropriate  - Identify discharge learning needs (meds, wound care, etc )  - Arrange for interpretive services to assist at discharge as needed  - Refer to Case Management Department for coordinating discharge planning if the patient needs post-hospital services based on physician/advanced practitioner order or complex needs related to functional status, cognitive ability, or social support system  Outcome: Progressing     Problem: Knowledge Deficit  Goal: Patient/family/caregiver demonstrates understanding of disease process, treatment plan, medications, and discharge instructions  Description: Complete learning assessment and assess knowledge base  Interventions:  - Provide teaching at level of understanding  - Provide teaching via preferred learning methods  Outcome: Progressing     Problem: MOBILITY - ADULT  Goal: Maintain or return to baseline ADL function  Description: INTERVENTIONS:  -  Assess patient's ability to carry out ADLs; assess patient's baseline for ADL function and identify physical deficits which impact ability to perform ADLs (bathing, care of mouth/teeth, toileting, grooming, dressing, etc )  - Assess/evaluate cause of self-care deficits   - Assess range of motion  - Assess patient's mobility; develop plan if impaired  - Assess patient's need for assistive devices and provide as appropriate  - Encourage maximum independence but intervene and supervise when necessary  - Involve family in performance of ADLs  - Assess for home care needs following discharge   - Consider OT consult to assist with ADL evaluation and planning for discharge  - Provide patient education as appropriate  Outcome: Progressing  Goal: Maintains/Returns to pre admission functional level  Description: INTERVENTIONS:  - Perform BMAT or MOVE assessment daily    - Set and communicate daily mobility goal to care team and patient/family/caregiver     - Collaborate with rehabilitation services on mobility goals if consulted  - Perform Range of Motion 2 times a day  - Reposition patient every 2 hours    - Dangle patient 2 times a day  - Stand patient 2 times a day  - Ambulate patient 2 times a day  - Out of bed to chair 2 times a day   - Out of bed for meals 2 times a day  - Out of bed for toileting  - Record patient progress and toleration of activity level   Outcome: Progressing     Problem: Prexisting or High Potential for Compromised Skin Integrity  Goal: Skin integrity is maintained or improved  Description: INTERVENTIONS:  - Identify patients at risk for skin breakdown  - Assess and monitor skin integrity  - Assess and monitor nutrition and hydration status  - Monitor labs   - Assess for incontinence   - Turn and reposition patient  - Assist with mobility/ambulation  - Relieve pressure over bony prominences  - Avoid friction and shearing  - Provide appropriate hygiene as needed including keeping skin clean and dry  - Evaluate need for skin moisturizer/barrier cream  - Collaborate with interdisciplinary team   - Patient/family teaching  - Consider wound care consult   Outcome: Progressing     Problem: Potential for Falls  Goal: Patient will remain free of falls  Description: INTERVENTIONS:  - Educate patient/family on patient safety including physical limitations  - Instruct patient to call for assistance with activity   - Consult OT/PT to assist with strengthening/mobility   - Keep Call bell within reach  - Keep bed low and locked with side rails adjusted as appropriate  - Keep care items and personal belongings within reach  - Initiate and maintain comfort rounds  - Make Fall Risk Sign visible to staff  - Offer Toileting every 2 Hours, in advance of need  - Initiate/Maintain alarm  - Obtain necessary fall risk management equipment  - Apply yellow socks and bracelet for high fall risk patients  - Consider moving patient to room near nurses station  Outcome: Progressing

## 2022-02-18 NOTE — DISCHARGE INSTR - OTHER ORDERS
1  Apply hydraguard to b/l heels, buttocks and sacrum BID and PRN for prevention and protection  2  Apply skin nourishing cream the entire skin daily for moisture  3  Turn and reposition patient every  2 hours   4  Elevate heels off of bed with pillows to offload pressure   5  Apply EHOB waffle cushion to chair when OOB, if able  6  Cleanse b/l lower extremities with soap and water, pat dry, and apply skin nourishing cream to the intact skin  Apply adaptic to the open wound beds on the lateral aspects, cover with maxorb silver and Top with ABD pad  Secure the dressings with kerlex wrap  Change dressings and render skin care every other day and as needed for soilage/dislodgement     7  Elevate b/l lower extremities for edema management as patient tolerates

## 2022-02-18 NOTE — CASE MANAGEMENT
Case Management Assessment & Discharge Planning Note    Patient name Daphne Ruvalcaba  Location /-26 MRN [id number]  : 1950 Date 2022       Current Admission Date: 2022  Current Admission Diagnosis:Severe sepsis [facility])   Patient Active Problem List    Diagnosis Date Noted    Elevated troponin 2022    Elevated d-dimer 2022    Allergy to multiple antibiotics 2020    Chronic back pain     History of Clostridium difficile colitis 2020    Ambulatory dysfunction 2020    Severe aortic stenosis 2020    Acute kidney injury ([facility] [address] ) 2020    Severe sepsis ([facility] [address] [facility] ) 07/10/2020    Depression with anxiety 07/10/2020    Recurrent cellulitis of lower extremity 07/10/2020    Idiopathic hypotension 07/10/2020    Depression 07/10/2020    Anemia 07/10/2020    Edema 07/10/2020    Vitamin D deficiency 07/10/2020    Vitamin B12 deficiency 07/10/2020    Lymphedema 07/10/2020    Chronic venous hypertension (idiopathic) with ulcer of bilateral lower extremity ([facility] [address] ) 07/10/2020      LOS (days): 2  Geometric Mean LOS (GMLOS) (days): 3 50  Days to GMLOS:1 5     OBJECTIVE:    Risk of Unplanned Readmission Score: 13         Current admission status: Inpatient  Referral Reason: Placement within 24-48 hours    Preferred Pharmacy:   [address] [place] [address]  Phone: [phone] Fax: [phone]    Primary Care Provider: Sanjuana White MD    Primary Insurance: MEDICARE  Secondary Insurance: BLUE CROSS    ASSESSMENT:    Readmission Root Cause  30 Day Readmission: No    Patient Information  Admitted from[de-identified] Home  Mental Status: Alert  During Assessment patient was accompanied by: Not accompanied during assessment  Assessment information provided by[de-identified] Patient  Primary Caregiver: Self  Caregiver's Relationship to Patient[de-identified] Other (Specify)  Support Systems: [facility] staff,Friends/neighbors  [place] of Residence: [address] do you live in?: [place] entry access options   Select all that apply : Ramp  Type of Current Residence[place]  In the last 12 months, was there a time when you were not able to pay the mortgage or rent on time?: No  In the last 12 months, how many places have you lived?: 1  In the last 12 months, was there a time when you did not have a steady place to sleep or slept in a shelter (including now)?: No  Homeless/housing insecurity resource given?: No  Living Arrangements: Lives Alone  Is patient a ?: No    Activities of Daily Living Prior to Admission  Functional Status: Independent  Completes ADLs independently?: Yes  Ambulates independently?: Yes  Does patient use assisted devices?: Yes  Assisted Devices (DME) used: Oriana Gonzalez  Does patient currently own DME?: Yes  What DME does the patient currently own?: Straight Cane,Walker,Wheelchair  Does patient have a history of Outpatient Therapy (PT/OT)?: No  Does the patient have a history of Short-Term Rehab?: Yes (@ [employer])  Does patient have a history of HHC?: Yes  Does patient currently have [address]?: Yes    Current Home Health Care  Type of Current Home Care Services: Nurse visit,Home OT,Home health aide,Home PT  Current Home Health Agency[de-identified] [address] Provider[de-identified] PCP    Patient Information Continued  Income Source: Pension/skilled nursing  Does patient have prescription coverage?: Yes  Within the past 12 months, you worried that your food would run out before you got the money to buy more : Never true  Within the past 12 months, the food you bought just didnt last and you didnt have money to get more : Never true  Food insecurity resource given?: No  Does patient receive dialysis treatments?: No  Does patient have a history of substance abuse?: No  Does patient have a history of Mental Health Diagnosis?: No    PHQ 2/9 Screening   Reviewed PHQ 2/9 Depression Screening Score?: No    Means of Transportation  Means of Transport to Appts[de-identified] Family transport  In the past 12 months, has lack of transportation kept you from medical appointments or from getting medications?: No  In the past 12 months, has lack of transportation kept you from meetings, work, or from getting things needed for daily living?: No  Was application for public transport provided?: No    DISCHARGE DETAILS:    Discharge planning discussed with[de-identified] Patient  Freedom of Choice: Yes  Comments - Freedom of Choice: Choice is to discharge to STR    CM contacted family/caregiver?: No- see comments (DECLINED STATING WILL CALL SISTER WITH UPDATES )  Were Treatment Team discharge recommendations reviewed with patient/caregiver?: Yes  Did patient/caregiver verbalize understanding of patient care needs?: N/A- going to facility  Were patient/caregiver advised of the risks associated with not following Treatment Team discharge recommendations?: Yes    Requested [address]         [facility]?: No    DME Referral Provided  Referral made for DME?: No    Other Referral/Resources/Interventions Provided:  Interventions: Short Term Rehab  Referral Comments: [facility] REFERRALS PENDING VIA [facility]    Would you like to participate in our [address] service program?  : No - Declined    Treatment Team Recommendation: Short Term Rehab  Discharge Destination Plan[de-identified] Short Term Rehab (REFERRALS PENDING )  Transport at Discharge : BLS Ambulance

## 2022-02-18 NOTE — ASSESSMENT & PLAN NOTE
4/7/2020        Mary Saunders  [address] [facility] [address]      Dear Mary,     We have been attempting to reach you in regards to concerns you had about a lump. Per ANAHI Bennett she would like you to follow up with the mammogram and a separate order has also been placed to have an ultrasound of soft tissue in center of chest to determine what the lump may be. Please follow up on having this imaging and if you need anything else please let us know.               Sincerely,           Stephanie Mai, RN  [address] [place] [address] [phone]   · Suspect in the setting of acute infection   · ACS R/O'd  · HS Trop: 184--> 159 --> 179  · Patient denies any acute complaints of chest pain  · No findings on telemetry  Discontinued  · ECHO: EF 60% with grade 1 diastolic dysfunction  Mild-to-moderate aortic regurgitation with critical stenosis  Monitor volume status closely

## 2022-02-18 NOTE — PROGRESS NOTES
Agus U  64   Progress Note - [facility] [place] 1950, 66 y o  female MRN: [id number]  Unit/Bed#: -01 Encounter: [id number]  Primary Care Provider: Maico Jacome MD   Date and time admitted to hospital: 2/16/2022 12:33 PM    * Severe sepsis [facility])  Assessment & Plan  · POA as evidenced by SIRS: Tachycardia ('s), Hypotension (SBP<95), Leukocytosis (WBC 23)  · Tachycardia resolved  · WBCs trending down  Currently: 9 3  · End organ Dysfunction: Acute kidney Injury (Improving)  · Infection: Suspect worsening B/L LE cellulitis vs UTI vs Pneumonia  · Legs appear stable per patient  · CXR: negative  · UA (+) nitrites, RBCs, WBCs, bacteria  There is presence of epithelial cells as well  Urine culture: Mixed Contaminants   · Fluids: S/P 1L NSS Bolus in the ED  Will c/w LR @ 75 ml/hr  · Will hold off from sepsis protocol Fluid resuscitation due to severe aortic stenosis and heart failure  · Monitor closely for signs of Volume overload   · ABX: S/P Clindamycin in the ED  Transitioned to IV Cefepime for now and await urine culture to alcides the antibiotics  · Lactic: Negative  · Procalcitonin elevated @ 10 37-->6 37  · CRP elevated @ 33 2  · CPK elevated @ 2600 3-->490  · Cultures:  · BC x2: Pending  · U/A and U/C: Positive  Mixed Contaminants  · Infectious Disease Consulted and appreciate their input  Acute kidney injury ([facility] [address] )  Assessment & Plan  · POA  · Crt on admisison: 1 44  · Baseline Crt  Appears to be around 0 7-0 8 however in January 2021, creatinine was 1 22     · Creatinine today: 1 06  · Likely secondary to sepsis and poor oral intake  · S/P IV hydration  · Avoid/Limit Nephrotoxins  · Avoid hypotension and fluctuation and BP  · Continue monitor renal function    Elevated troponin  Assessment & Plan  · Suspect in the setting of acute infection   · ACS R/O'd  · HS Trop: 184--> 159 --> 179  · Patient denies any acute complaints of chest pain  · No findings on telemetry  Discontinued  · ECHO: EF 60% with grade 1 diastolic dysfunction  Mild-to-moderate aortic regurgitation with critical stenosis  Monitor volume status closely  Recurrent cellulitis of lower extremity  Assessment & Plan  · Patient with history of recurrent cellulitis  · Patient states she has wound nurses following at home with frequent dressing changes and reports her legs have been "stable"  · Wound Care Consulted and appreciate their recommendations  · Continue supportive care    Elevated d-dimer  Assessment & Plan  · POA  · D-Dimer: 5 57--> 3 99  · Vasc Duplex:  No evidence of acute or chronic DVTs bilaterally  No evidence of superficial thrombophlebitis noted bilaterally  · No acute complaints of shortness of breath, tachycardia or hypoxia  Therefore, hold off on CTA    Ambulatory dysfunction  Assessment & Plan  · Pre-Syncope  · Patient with History of recurrent falls over the last 2 days  · CTH:  No acute intracranial abnormality  Small focus of encephalomalacia within the left occipital lobe which may represent an old infarct  · Xray RLE:  No acute osseous abnormality  · Xray LLE:  No acute osseous abnormality  · Continue fall precautions   · Perform Orthostatic Blood pressures, starting tomorrow 2/17  · PT/OT evaluation: Short Term Rehab  · Case Management Consulted    History of Clostridium difficile colitis  Assessment & Plan  · History of C-Diff infection  · Continue prophylactic PO Vancomycin in the setting of acute infection and antibiotic use    Severe aortic stenosis  Assessment & Plan  · History of severe aortic stenosis  · Previous Echo (5/2021): Ef 55% with Grade 1 diastolic dysfunction and Severe Aortic stenosis  · Repeat ECHO demonstrates EF 60% with critical AS  · Monitor I/O's and volume status  · Daily weights  Follow up with cardiology as an out-patient      Idiopathic hypotension  Assessment & Plan  · Continue home medication:  · Midodrine 10 mg t i d   · Hold parameters for SBP >/= 130    Depression with anxiety  Assessment & Plan  · Continue Home Medications:  · Wellbutrin, BuSpar, and Lexapro  · EK with QT interval: 409      VTE Pharmacologic Prophylaxis: VTE Score: 5 High Risk (Score >/= 5) - Pharmacological DVT Prophylaxis Ordered: heparin  Sequential Compression Devices Ordered  Patient Centered Rounds: I performed bedside rounds with nursing staff today  Discussions with Specialists or Other Care Team Provider:  Infectious disease, physical therapy, occupational therapy, Case Management    Education and Discussions with Family / Patient: Attempted to update  (sister) via phone  Left voicemail  Time Spent for Care: 30 minutes  More than 50% of total time spent on counseling and coordination of care as described above  Current Length of Stay: 2 day(s)  Current Patient Status: Inpatient   Certification Statement: The patient will continue to require additional inpatient hospital stay due to Sepsis requiring IV antibiotics and placement  Discharge Plan: Anticipate discharge in 24-48 hrs to rehab facility  Code Status: Level 1 - Full Code    Subjective:   Patient states she feels okay  Patient complaining of multiple episodes of diarrhea  Patient denies any active chest pain, worsening shortness of breath, abdominal pain, nausea, or vomiting  Objective:     Vitals:   Temp (24hrs), Av 1 °F (36 2 °C), Min:96 4 °F (35 8 °C), Max:98 4 °F (36 9 °C)    Temp:  [96 4 °F (35 8 °C)-98 4 °F (36 9 °C)] 96 4 °F (35 8 °C)  HR:  [68-92] 68  Resp:  [16-18] 16  BP: (100-105)/(52-56) 100/52  SpO2:  [97 %-99 %] 97 %  Body mass index is 32 71 kg/m²  Input and Output Summary (last 24 hours):   No intake or output data in the 24 hours ending 22 1780    Physical Exam:   Physical Exam  Vitals and nursing note reviewed  Constitutional:       General: She is not in acute distress  Appearance: She is obese  She is ill-appearing  HENT:      Head: Normocephalic  Nose: Nose normal  No congestion  Mouth/Throat:      Mouth: Mucous membranes are moist       Pharynx: Oropharynx is clear  Cardiovascular:      Rate and Rhythm: Normal rate and regular rhythm  Pulses: Normal pulses  Heart sounds: Murmur heard  Pulmonary:      Effort: Pulmonary effort is normal  No respiratory distress  Breath sounds: Decreased breath sounds present  Abdominal:      General: Bowel sounds are normal  There is no distension  Palpations: Abdomen is soft  Tenderness: There is no abdominal tenderness  Musculoskeletal:      Cervical back: Normal range of motion  Right lower leg: Edema present  Left lower leg: Edema present  Skin:     Capillary Refill: Capillary refill takes less than 2 seconds  Findings: Erythema (B/L LE) and lesion (B/L LE) present  Neurological:      Mental Status: She is alert and oriented to person, place, and time  Mental status is at baseline  Motor: Weakness (Generalized) present  Psychiatric:         Mood and Affect: Mood normal          Behavior: Behavior normal          Thought Content:  Thought content normal          Judgment: Judgment normal          Additional Data:     Labs:  Results from last 7 days   Lab Units 02/18/22  0612   WBC Thousand/uL 9 30   HEMOGLOBIN g/dL 7 8*   HEMATOCRIT % 25 4*   PLATELETS Thousands/uL 186   NEUTROS PCT % 82*   LYMPHS PCT % 9*   MONOS PCT % 6   EOS PCT % 2     Results from last 7 days   Lab Units 02/18/22  0612 02/17/22  0444 02/17/22  0444   SODIUM mmol/L 137   < > 135   POTASSIUM mmol/L 3 7   < > 3 7   CHLORIDE mmol/L 106   < > 102   CO2 mmol/L 22   < > 22   BUN mg/dL 29*   < > 31*   CREATININE mg/dL 1 06   < > 1 22*   ANION GAP mmol/L 9   < > 11   CALCIUM mg/dL 8 8   < > 8 2*   ALBUMIN g/dL  --   --  2 6*   TOTAL BILIRUBIN mg/dL  --   --  0 36   ALK PHOS U/L  --   --  54 3   ALT U/L  --   --  20   AST U/L  --   --  53*   GLUCOSE RANDOM mg/dL 73   < > 65    < > = values in this interval not displayed  Results from last 7 days   Lab Units 02/18/22  0612 02/17/22  0444 02/16/22  1620   LACTIC ACID mmol/L  --   --  0 8   PROCALCITONIN ng/ml 6 37* 10 37*  --        Lines/Drains:  Invasive Devices  Report    Peripheral Intravenous Line            Peripheral IV 02/16/22 Right Antecubital 2 days                      Imaging: Reviewed radiology reports from this admission including: ECHO and No pertinent imaging reviewed  Recent Cultures (last 7 days):   Results from last 7 days   Lab Units 02/17/22  0352 02/16/22  1410   BLOOD CULTURE   --  No Growth at 24 hrs  No Growth at 24 hrs  URINE CULTURE  20,000-29,000 cfu/ml   --        Last 24 Hours Medication List:   Current Facility-Administered Medications   Medication Dose Route Frequency Provider Last Rate    acetaminophen  650 mg Oral Q6H PRN ROSCOE Lujan      buPROPion  300 mg Oral Daily ROSCOE Lujan      busPIRone  15 mg Oral BID ROSCOE Lujan      cefazolin  1,000 mg Intravenous Q8H Joe Borges DO 1,000 mg (02/18/22 1726)    cholecalciferol  1,000 Units Oral Daily ROSCOE Lujan      escitalopram  10 mg Oral Daily ROSCOE Lujan      ferrous sulfate  650 mg Oral Daily With Breakfast ROSCOE Lujan      heparin (porcine)  5,000 Units Subcutaneous [facility] ROSCOE Lujan      midodrine  10 mg Oral TID AC ROSCOE Lujan      multivitamin stress formula  1 tablet Oral Daily ROSCOE Lujan      ondansetron  4 mg Intravenous Q6H PRN ROSCOE Lujan      saccharomyces boulardii  250 mg Oral Daily ROSCOE Lujan      vancomycin  125 mg Oral Q12H [address]          Today, Patient Was Seen By: ROSCOE Lujan    **Please Note: This note may have been constructed using a voice recognition system  **

## 2022-02-18 NOTE — ASSESSMENT & PLAN NOTE
· POA as evidenced by SIRS: Tachycardia ('s), Hypotension (SBP<95), Leukocytosis (WBC 23)  · Tachycardia resolved  · WBCs trending down  Currently: 9 3  · End organ Dysfunction: Acute kidney Injury (Improving)  · Infection: Suspect worsening B/L LE cellulitis vs UTI vs Pneumonia  · Legs appear stable per patient  · CXR: negative  · UA (+) nitrites, RBCs, WBCs, bacteria  There is presence of epithelial cells as well  Urine culture: Mixed Contaminants   · Fluids: S/P 1L NSS Bolus in the ED  Will c/w LR @ 75 ml/hr  · Will hold off from sepsis protocol Fluid resuscitation due to severe aortic stenosis and heart failure  · Monitor closely for signs of Volume overload   · ABX: S/P Clindamycin in the ED  Transitioned to IV Cefepime for now and await urine culture to alcides the antibiotics  · Lactic: Negative  · Procalcitonin elevated @ 10 37-->6 37  · CRP elevated @ 33 2  · CPK elevated @ 2600 3-->490  · Cultures:  · BC x2: Pending  · U/A and U/C: Positive  Mixed Contaminants  · Infectious Disease Consulted and appreciate their input

## 2022-02-18 NOTE — ASSESSMENT & PLAN NOTE
· POA  · D-Dimer: 5 57--> 3 99  · Vasc Duplex:  No evidence of acute or chronic DVTs bilaterally  No evidence of superficial thrombophlebitis noted bilaterally  · No acute complaints of shortness of breath, tachycardia or hypoxia    Therefore, hold off on CTA

## 2022-02-18 NOTE — ASSESSMENT & PLAN NOTE
· Pre-Syncope  · Patient with History of recurrent falls over the last 2 days  · CTH:  No acute intracranial abnormality  Small focus of encephalomalacia within the left occipital lobe which may represent an old infarct    · Xray RLE:  No acute osseous abnormality  · Xray LLE:  No acute osseous abnormality  · Continue fall precautions   · Perform Orthostatic Blood pressures, starting tomorrow 2/17  · PT/OT evaluation: Short Term Rehab  · Case Management Consulted

## 2022-02-18 NOTE — WOUND OSTOMY CARE
Progress Note - Wound   Gennyrimarlee Sanchez 66 y o  female MRN: [id number]  Unit/Bed#: -01 Encounter: [id number]      Assessment:  Wound care consulted for assessment of lower extremity wounds  Patient admitted with sepsis  History of - lymphedema, c diff, HTN, depression and anxiety  Patient seen in bed, alert and oriented x 4, cooperative and agreeable for assessment of the lower extremities  Patient is incontinent of urine - purewick in use and continent of bowel per self report  Patient declined a full skin assessment - denies other wounds besides her lower extremities, and no other wounds reported by the primary RN  Patient is an assist with care  Will recommend a general preventative skin care plan  ID is following the patient and patient is on IV ABT  B/l heels are intact with no redness or wounds  The b/l groin is dry and intact with no evidenced of fungal rash  Patient reports remote history of following with the wound care center  She now has VNA who comes to the house 3 times per week, and applies UNNA boots  No UNNA boots in place at time of the assessment  1  General image of the anterior lower extremities  Chronic skin changes noted to the lower extremities r/t lymphedema and venous stasis  Skin is intact to these aspects with hemosiderin staining/pink color  There is dry scaling of the skin, scarring, and dry adhered scabbing/crusting  No redness/warmth noted  R lateral leg   L lateral leg   2  B/l lateral lower extremities with irregular shaped partial thickness wounds  Related to lymphedema/venous stasis  Each aspect measured separately in the flow-sheets below  L lateral aspect is a cluster of wounds that are all measured together  Wound beds are 100% moist pink/red tissue  Edges attached fragile with maceration   Kylie-wounds are intact with pink/hemosiderin colored skin and scarring, dry scaling, and dry adhered scabbing/crusting   -will recommend dressing to manage the drainage (moderate amount noted on bed pad)  -elevate the lower extremities for edema management  +3 edema noted b/l, non-pitting  Educated patient on the importance of frequent offloading of pressure via turning, repositioning and weight-shifting  Verbalized understanding of plan of care  No induration, fluctuance, odor, warmth/temperature differences, redness, or purulence noted to the above noted wounds and skin areas assessed  New dressings applied  Patient tolerated well- denies pain and no s/s of non-verbal pain or discomfort observed during the encounter  Primary nurse aware of plan of care  See flow sheets for more detailed assessment findings  Will follow along  Plan:   1  Apply hydraguard to b/l heels, buttocks and sacrum BID and PRN for prevention and protection  2  Apply skin nourishing cream the entire skin daily for moisture  3  Turn and reposition patient every  2 hours   4  Elevate heels off of bed with pillows to offload pressure   5  Apply EHOB waffle cushion to chair when OOB, if able  6  Cleanse b/l lower extremities with soap and water, pat dry, and apply skin nourishing cream to the intact skin  Apply adaptic to the open wound beds on the lateral aspects, cover with maxorb silver and Top with ABD pad  Secure the dressings with kerlex wrap  Change dressings and render skin care every other day and as needed for soilage/dislodgement  7  Elevate b/l lower extremities for edema management as patient tolerates  8  Wound care will follow along with patient weekly, please call or tiger text with questions and concerns    Objective:    Vitals: Blood pressure 100/56, pulse 92, temperature 98 4 °F (36 9 °C), temperature source Oral, resp  rate 18, height 4' 10" (1 473 m), weight 71 kg (156 lb 8 4 oz), SpO2 97 %  ,Body mass index is 32 71 kg/m²        Wound 07/27/20 Venous Ulcer Pretibial Left;Lateral (Active)   Wound Image   02/18/22 0746   Wound Description Beefy red;Pink 02/18/22 0746   Kylie-wound Assessment Intact;Fragile;Scaly; Yellow-brown; Maceration 02/18/22 0746   Wound Length (cm) 7 5 cm 02/18/22 0746   Wound Width (cm) 7 cm 02/18/22 0746   Wound Depth (cm) 0 1 cm 02/18/22 0746   Wound Surface Area (cm^2) 52 5 cm^2 02/18/22 0746   Wound Volume (cm^3) 5 25 cm^3 02/18/22 0746   Calculated Wound Volume (cm^3) 5 25 cm^3 02/18/22 0746   Tunneling 0 cm 02/18/22 0746   Tunneling in depth located at 0 02/18/22 0746   Undermining 0 02/18/22 0746   Undermining is depth extending from 0 02/18/22 0746   Drainage Amount Moderate 02/18/22 0746   Drainage Description Bloody; Serosanguineous 02/18/22 0746   Non-staged Wound Description Partial thickness 02/18/22 0746   Treatments Cleansed;Irrigation with NSS;Site care;Elevated 02/18/22 0746   Dressing Calcium Alginate with Silver;ABD;Dry dressing 02/18/22 0746   Wound packed? No 02/18/22 0746   Packing- # removed 0 02/18/22 0746   Packing- # inserted 0 02/18/22 0746   Dressing Changed New 02/18/22 0746   Patient Tolerance Tolerated well 02/18/22 0746   Dressing Status Clean;Dry; Intact 02/18/22 0746       Wound 07/27/20 Venous Ulcer Pretibial Right;Lateral (Active)   Wound Image   02/18/22 0744   Wound Description Beefy red;Pink 02/18/22 0744   Kylie-wound Assessment Intact;Fragile; Maceration;Scaly; Yellow-brown 02/18/22 0744   Wound Length (cm) 4 5 cm 02/18/22 0744   Wound Width (cm) 2 3 cm 02/18/22 0744   Wound Depth (cm) 0 1 cm 02/18/22 0744   Wound Surface Area (cm^2) 10 35 cm^2 02/18/22 0744   Wound Volume (cm^3) 1 035 cm^3 02/18/22 0744   Calculated Wound Volume (cm^3) 1 04 cm^3 02/18/22 0744   Tunneling 0 cm 02/18/22 0744   Tunneling in depth located at 0 02/18/22 0744   Undermining 0 02/18/22 0744   Undermining is depth extending from 0 02/18/22 0744   Drainage Amount Moderate 02/18/22 0744   Drainage Description Bloody; Serosanguineous 02/18/22 0744   Non-staged Wound Description Partial thickness 02/18/22 0744   Treatments Cleansed;Irrigation with NSS;Site care;Elevated 02/18/22 0744   Dressing Calcium Alginate with Silver;ABD;Dry dressing 02/18/22 0744   Wound packed? No 02/18/22 0744   Packing- # removed 0 02/18/22 0396   Packing- # inserted 0 02/18/22 0625   Dressing Changed New 02/18/22 0744   Patient Tolerance Tolerated well 02/18/22 0744   Dressing Status Clean;Dry; Intact 02/18/22 0744           Recommendations written as orders  AVS updated    Marie Colorado RN BSN [facility]

## 2022-02-18 NOTE — PLAN OF CARE
Problem: OCCUPATIONAL THERAPY ADULT  Goal: Performs self-care activities at highest level of function for planned discharge setting  See evaluation for individualized goals  Description: Treatment Interventions: ADL retraining,Functional transfer training,Neuromuscular reeducation,Patient/family training,Endurance training,Energy conservation          See flowsheet documentation for full assessment, interventions and recommendations  Note: Limitation: Decreased ADL status,Decreased UE ROM,Decreased UE strength,Decreased Safe judgement during ADL,Decreased endurance,Decreased self-care trans,Decreased high-level ADLs  Prognosis: Good  Assessment: Patient is a 66 y o  female seen for OT evaluation at [place] [address] following admission on 2/16/2022  s/p Severe sepsis ([facility] [address] )  Comorbidities impacting functional performance include: depression with anxiety, recurrnt cellulitis, idiopathic HTN, AMADOU, severe aortic stenosis, ambulatory dysfunction, chronic back pain  Patient presents with active orders for OT eval and treat and Up with assistance   Performed at least 2 patient identifiers during session including name and wristband  Pt reports needing assistance with ADL/IADLs at baseline, lives alone in 1 story home with ramped entrance, and uses RW at baseline  (-) driving  Upon initial evaluation, patient requires supervision for UB ADLs, max assist for LB ADLs, and min assist x2 for transfers and functional mobility within room and bathroom with the use of with RW  Based on functional eval, patient presents A&Ox4 with intact  attention, intact  safety awareness, and intact  short term and long term memory   Occupational performance is affected by the following deficits: decreased muscular strength , decreased standing tolerance for self care tasks , decreased dynamic balance impacting functional reach, decreased trunk control , decreased activity tolerance  and (+) pain  Based on these findings, functional performance deficits, and assessment findings, pt has been identified as a high complexity evaluation  Personal factors impacting performance at the time of evaluation include: decreased (+) Hx of falls , decreased ADL independence , decreased IADL independence and High fall risk   Patient would benefit from OT services to maximize level of functional independence and safety in self-care and transfers  Occupational areas to address include:bathing/showering, toileting, dressing , eating , personal hygiene/grooming , bed mobility , functional mobility, transfer to all surfaces, household management, fall prevention  and energy conservation   From OT standpoint, recommendation at time of D/C would be post-acute rehabilitation         OT Discharge Recommendation: Post acute rehabilitation services (Pt agreeable, CM made aware)  OT - OK to Discharge: Yes (Once medically clear )     Madeleine Gillis, OT

## 2022-02-19 LAB
ANION GAP SERPL CALCULATED.3IONS-SCNC: 7 MMOL/L (ref 4–13)
BASOPHILS # BLD AUTO: 0.04 THOUSANDS/ΜL (ref 0–0.1)
BASOPHILS NFR BLD AUTO: 1 % (ref 0–1)
BUN SERPL-MCNC: 22 MG/DL (ref 6–20)
CALCIUM SERPL-MCNC: 8.7 MG/DL (ref 8.4–10.2)
CHLORIDE SERPL-SCNC: 107 MMOL/L (ref 96–108)
CO2 SERPL-SCNC: 25 MMOL/L (ref 22–33)
CREAT SERPL-MCNC: 0.97 MG/DL (ref 0.4–1.1)
CRP SERPL QL: 11.4 MG/DL (ref 0–1)
D DIMER PPP FEU-MCNC: 1.84 UG/ML FEU
EOSINOPHIL # BLD AUTO: 0.39 THOUSAND/ΜL (ref 0–0.61)
EOSINOPHIL NFR BLD AUTO: 6 % (ref 0–6)
ERYTHROCYTE [DISTWIDTH] IN BLOOD BY AUTOMATED COUNT: 15.5 % (ref 11.6–15.1)
GFR SERPL CREATININE-BSD FRML MDRD: 58 ML/MIN/1.73SQ M
GLUCOSE SERPL-MCNC: 71 MG/DL (ref 65–140)
HCT VFR BLD AUTO: 27.5 % (ref 34.8–46.1)
HGB BLD-MCNC: 8.2 G/DL (ref 11.5–15.4)
IMM GRANULOCYTES # BLD AUTO: 0.04 THOUSAND/UL (ref 0–0.2)
IMM GRANULOCYTES NFR BLD AUTO: 1 % (ref 0–2)
LYMPHOCYTES # BLD AUTO: 1.24 THOUSANDS/ΜL (ref 0.6–4.47)
LYMPHOCYTES NFR BLD AUTO: 18 % (ref 14–44)
MAGNESIUM SERPL-MCNC: 2.1 MG/DL (ref 1.6–2.6)
MCH RBC QN AUTO: 27.7 PG (ref 26.8–34.3)
MCHC RBC AUTO-ENTMCNC: 29.8 G/DL (ref 31.4–37.4)
MCV RBC AUTO: 93 FL (ref 82–98)
MONOCYTES # BLD AUTO: 0.51 THOUSAND/ΜL (ref 0.17–1.22)
MONOCYTES NFR BLD AUTO: 7 % (ref 4–12)
NEUTROPHILS # BLD AUTO: 4.79 THOUSANDS/ΜL (ref 1.85–7.62)
NEUTS SEG NFR BLD AUTO: 67 % (ref 43–75)
NRBC BLD AUTO-RTO: 0 /100 WBCS
PLATELET # BLD AUTO: 180 THOUSANDS/UL (ref 149–390)
PMV BLD AUTO: 10.9 FL (ref 8.9–12.7)
POTASSIUM SERPL-SCNC: 4 MMOL/L (ref 3.5–5)
RBC # BLD AUTO: 2.96 MILLION/UL (ref 3.81–5.12)
SODIUM SERPL-SCNC: 139 MMOL/L (ref 133–145)
WBC # BLD AUTO: 7.01 THOUSAND/UL (ref 4.31–10.16)

## 2022-02-19 PROCEDURE — 85025 COMPLETE CBC W/AUTO DIFF WBC: CPT

## 2022-02-19 PROCEDURE — 80048 BASIC METABOLIC PNL TOTAL CA: CPT

## 2022-02-19 PROCEDURE — 86140 C-REACTIVE PROTEIN: CPT

## 2022-02-19 PROCEDURE — 99232 SBSQ HOSP IP/OBS MODERATE 35: CPT

## 2022-02-19 PROCEDURE — 83735 ASSAY OF MAGNESIUM: CPT

## 2022-02-19 PROCEDURE — 85379 FIBRIN DEGRADATION QUANT: CPT

## 2022-02-19 RX ADMIN — Medication 1000 UNITS: at 11:08

## 2022-02-19 RX ADMIN — ACETAMINOPHEN 650 MG: 325 TABLET, FILM COATED ORAL at 11:09

## 2022-02-19 RX ADMIN — BUSPIRONE HYDROCHLORIDE 15 MG: 5 TABLET ORAL at 17:24

## 2022-02-19 RX ADMIN — HEPARIN SODIUM 5000 UNITS: 5000 INJECTION INTRAVENOUS; SUBCUTANEOUS at 05:55

## 2022-02-19 RX ADMIN — BUPROPION 300 MG: 150 TABLET, EXTENDED RELEASE ORAL at 11:08

## 2022-02-19 RX ADMIN — MIDODRINE HYDROCHLORIDE 10 MG: 5 TABLET ORAL at 11:08

## 2022-02-19 RX ADMIN — ESCITALOPRAM OXALATE 10 MG: 20 TABLET ORAL at 11:08

## 2022-02-19 RX ADMIN — MIDODRINE HYDROCHLORIDE 10 MG: 5 TABLET ORAL at 16:15

## 2022-02-19 RX ADMIN — Medication 125 MG: at 20:05

## 2022-02-19 RX ADMIN — CEFAZOLIN SODIUM 1000 MG: 1 SOLUTION INTRAVENOUS at 17:24

## 2022-02-19 RX ADMIN — CEFAZOLIN SODIUM 1000 MG: 1 SOLUTION INTRAVENOUS at 11:06

## 2022-02-19 RX ADMIN — MIDODRINE HYDROCHLORIDE 10 MG: 5 TABLET ORAL at 06:34

## 2022-02-19 RX ADMIN — Medication 250 MG: at 11:07

## 2022-02-19 RX ADMIN — B-COMPLEX W/ C & FOLIC ACID TAB 1 TABLET: TAB at 11:08

## 2022-02-19 RX ADMIN — Medication 125 MG: at 11:07

## 2022-02-19 RX ADMIN — FERROUS SULFATE TAB 325 MG (65 MG ELEMENTAL FE) 650 MG: 325 (65 FE) TAB at 06:34

## 2022-02-19 RX ADMIN — HEPARIN SODIUM 5000 UNITS: 5000 INJECTION INTRAVENOUS; SUBCUTANEOUS at 21:01

## 2022-02-19 RX ADMIN — HEPARIN SODIUM 5000 UNITS: 5000 INJECTION INTRAVENOUS; SUBCUTANEOUS at 16:15

## 2022-02-19 RX ADMIN — BUSPIRONE HYDROCHLORIDE 15 MG: 5 TABLET ORAL at 11:07

## 2022-02-19 RX ADMIN — CEFAZOLIN SODIUM 1000 MG: 1 SOLUTION INTRAVENOUS at 02:15

## 2022-02-19 NOTE — ASSESSMENT & PLAN NOTE
· POA as evidenced by SIRS: Tachycardia ('s), Hypotension (SBP<95), Leukocytosis (WBC 23)  · Tachycardia resolved  · WBCs trending down  Currently: 7 01  · End organ Dysfunction: Acute kidney Injury (Resolved)  · Infection: Suspect worsening B/L LE cellulitis vs UTI vs Pneumonia  · Legs appear stable per patient  · CXR: negative  · UA (+) nitrites, RBCs, WBCs, bacteria  There is presence of epithelial cells as well  Urine culture: Mixed Contaminants   · Fluids: S/P 1L NSS Bolus in the ED  Will c/w LR @ 75 ml/hr  · Will hold off from sepsis protocol Fluid resuscitation due to severe aortic stenosis and heart failure  · Monitor closely for signs of Volume overload   · ABX: S/P Clindamycin in the ED  Transitioned to IV Cefepime for now and await urine culture   · Lactic: Negative  · Procalcitonin elevated @ 10 37-->6 37  · CRP elevated @ 33 2-->11 4  · CK elevated @ 2600 3-->490  · Cultures:  · BC x2: Pending  · U/A and U/C: Positive  Mixed Contaminants  · Infectious Disease following, appreciate recommendations:  · Cellulitis suspect streptococcal infection  Continue Ancef 1 G IV q 8 hours    Upon discharge, transition to cephalexin 500 mg p o  Q 6 hours to complete 7 day course through February 22nd

## 2022-02-19 NOTE — DISCHARGE INSTRUCTIONS
Cellulitis   WHAT YOU NEED TO KNOW:   Cellulitis is a skin infection caused by bacteria  Cellulitis may go away on its own or you may need treatment  Your healthcare provider may draw a [place] around the outside edges of your cellulitis  If your cellulitis spreads, your healthcare provider will see it outside of the [place]  DISCHARGE INSTRUCTIONS:   Call 911 if:   · You have sudden trouble breathing or chest pain  Seek care immediately if:   · Your wound gets larger and more painful  · You feel a crackling under your skin when you touch it  · You have purple dots or bumps on your skin, or you see bleeding under your skin  · You have new swelling and pain in your legs  · The red, warm, swollen area gets larger  · You see red streaks coming from the infected area  Contact your healthcare provider if:   · You have a fever  · Your fever or pain does not go away or gets worse  · The area does not get smaller after 2 days of antibiotics  · Your skin is flaking or peeling off  · You have questions or concerns about your condition or care  Medicines:   · Antibiotics  help treat the bacterial infection  · NSAIDs , such as ibuprofen, help decrease swelling, pain, and fever  NSAIDs can cause stomach bleeding or kidney problems in certain people  If you take blood thinner medicine, always ask if NSAIDs are safe for you  Always read the medicine label and follow directions  Do not give these medicines to children under 10months of age without direction from your child's healthcare provider  · Acetaminophen  decreases pain and fever  It is available without a doctor's order  Ask how much to take and how often to take it  Follow directions  Read the labels of all other medicines you are using to see if they also contain acetaminophen, or ask your doctor or pharmacist  Acetaminophen can cause liver damage if not taken correctly   Do not use more than 4 grams (4,000 milligrams) total of acetaminophen in one day  · Take your medicine as directed  Contact your healthcare provider if you think your medicine is not helping or if you have side effects  Tell him or her if you are allergic to any medicine  Keep a list of the medicines, vitamins, and herbs you take  Include the amounts, and when and why you take them  Bring the list or the pill bottles to follow-up visits  Carry your medicine list with you in case of an emergency  Self-care:   · Elevate the area above the level of your heart  as often as you can  This will help decrease swelling and pain  Prop the area on pillows or blankets to keep it elevated comfortably  · Clean the area daily until the wound scabs over  Gently wash the area with soap and water  Pat dry  Use dressings as directed  · Place cool or warm, wet cloths on the area as directed  Use clean cloths and clean water  Leave it on the area until the cloth is room temperature  Pat the area dry with a clean, dry cloth  The cloths may help decrease pain  Prevent cellulitis:   · Do not scratch bug bites or areas of injury  You increase your risk for cellulitis by scratching these areas  · Do not share personal items, such as towels, clothing, and razors  · Clean exercise equipment  with germ-killing  before and after you use it  · Wash your hands often  Use soap and water  Wash your hands after you use the bathroom, change a child's diapers, or sneeze  Wash your hands before you prepare or eat food  Use lotion to prevent dry, cracked skin  · Wear pressure stockings as directed  You may be told to wear the stockings if you have peripheral edema  The stockings improve blood flow and decrease swelling  · Treat athlete's foot  This can help prevent the spread of a bacterial skin infection  Follow up with your healthcare provider within 3 days, or as directed:   Your healthcare provider will check if your cellulitis is getting better  You may need different medicine  Write down your questions so you remember to ask them during your visits  © Copyright [address] Information is for End User's use only and may not be sold, redistributed or otherwise used for commercial purposes  All illustrations and images included in CareNotes® are the copyrighted property of [employer]  or [employer]  The above information is an  only  It is not intended as medical advice for individual conditions or treatments  Talk to your doctor, nurse or pharmacist before following any medical regimen to see if it is safe and effective for you  Aortic Stenosis   WHAT YOU NEED TO KNOW:   Aortic stenosis is a condition that makes your aortic valve become narrow and stiff  The narrow, stiff valve causes your heart to work harder to pump blood into the aorta  DISCHARGE INSTRUCTIONS:   Call your local emergency number (911 in the [address] [place] [address],[address]) or have someone call if:   · You have any of the following signs of a heart attack:      ? Squeezing, pressure, or pain in your chest    ? You may  also have any of the following:     § Discomfort or pain in your back, neck, jaw, stomach, or arm    § Shortness of breath    § Nausea or vomiting    § Lightheadedness or a sudden cold sweat    · You have any of the following signs of a stroke:      ? Numbness or drooping on one side of your face     ? Weakness in an arm or leg    ? Confusion or difficulty speaking    ? Dizziness, a severe headache, or vision loss    Seek care immediately if:   · You have chest pain when you move around  It goes away when you are still  · You have increasing shortness of breath  · You faint  Call your doctor or cardiologist if:   · The veins in your neck look swollen or are bulging  · You have increased swelling in your legs or ankles  · Your heart beats faster than usual     · You feel your heart flutter often      · You have questions or concerns about your condition or care  Medicines: You may need any of the following:  · Medicines  may help decrease your cholesterol levels and your blood pressure  You may also be given medicine to help lower swelling  · Take your medicine as directed  Contact your healthcare provider if you think your medicine is not helping or if you have side effects  Tell him or her if you are allergic to any medicine  Keep a list of the medicines, vitamins, and herbs you take  Include the amounts, and when and why you take them  Bring the list or the pill bottles to follow-up visits  Carry your medicine list with you in case of an emergency  Manage aortic stenosis:   · Limit activities  Your healthcare provider may have you limit strenuous activity  Strenuous activity will make your heart work too hard  Ask your healthcare provider what activities are safe for you to do  · Eat heart-healthy foods  Heart-healthy foods include salmon, tuna, walnuts, whole-grain breads, low-fat dairy products, beans, and oils such as olive or canola oil  A dietitian or your provider can give you more information on meal plans such as the DASH (Dietary Approaches to Stop Hypertension) eating plan  The DASH plan is low in sodium, processed sugar, unhealthy fats, and total fat  It is high in potassium, calcium, and fiber  These can be found in vegetables, fruit, and whole-grain foods  · Limit sodium (salt) as directed  Too much sodium can affect your fluid balance  Check labels to find low-sodium or no-salt-added foods  You can also make small changes to get less salt  For example, if you add salt while you cook, do not add more salt at the table  Ask your healthcare provider or dietitian for more ways to cut down on salt  · Limit or do not drink alcohol  Ask your healthcare provider if it is okay for you to drink alcohol  Alcohol can increase your risk for high blood pressure and coronary artery disease   Your provider can tell you how many drinks are okay to have within 24 hours or within 1 week  A drink of alcohol is 12 ounces of beer, 5 ounces of wine, or 1½ ounces of liquor  · Maintain a healthy weight  Being overweight can increase your risk for high blood pressure and coronary artery disease  These conditions can make your symptoms worse  Ask your healthcare provider what a healthy weight is for you  Ask him or her to help you create a weight loss plan if you are overweight  · Talk to your healthcare provider about pregnancy  If you are a woman and want to get pregnant, talk to your healthcare provider  You and your baby may need to be monitored by specialists during your pregnancy  · Ask about vaccines you may need  Certain diseases are dangerous for a person who has aortic stenosis  Vaccines help prevent infections that can cause some diseases  Get a flu vaccine as soon as recommended each year, usually in September or October  Your healthcare provider can tell you if you also need other vaccines, and when to get them  Prevent aortic stenosis:   · Manage other health conditions  High blood pressure and high cholesterol levels increase your risk for aortic stenosis  Ask your healthcare provider for more information on managing these conditions  · Get treatment for strep throat  If strep throat is not treated, it can cause rheumatic fever  · Take care of your teeth and gums  Gingivitis, a gum disease, increases your risk for aortic stenosis  See your dental provider regularly to treat problems early  Follow up with your doctor or cardiologist as directed: You may need to return for more tests to check your heart over time  Write down your questions so you remember to ask them during your visits  © Copyright [employer] 2021 Information is for End User's use only and may not be sold, redistributed or otherwise used for commercial purposes   All illustrations and images included in CareNotes® are the copyrighted property of [employer]  or Bharti Kwan   The above information is an  only  It is not intended as medical advice for individual conditions or treatments  Talk to your doctor, nurse or pharmacist before following any medical regimen to see if it is safe and effective for you

## 2022-02-19 NOTE — ASSESSMENT & PLAN NOTE
· POA  · D-Dimer: 5 57--> 3 99  · Vasc Duplex:  No evidence of acute or chronic DVTs bilaterally  No evidence of superficial thrombophlebitis noted bilaterally  · No acute complaints of shortness of breath, tachycardia or hypoxia      · Hold off on CTA

## 2022-02-19 NOTE — PLAN OF CARE
Problem: PAIN - ADULT  Goal: Verbalizes/displays adequate comfort level or baseline comfort level  Description: Interventions:  - Encourage patient to monitor pain and request assistance  - Assess pain using appropriate pain scale  - Administer analgesics based on type and severity of pain and evaluate response  - Implement non-pharmacological measures as appropriate and evaluate response  - Consider cultural and social influences on pain and pain management  - Notify physician/advanced practitioner if interventions unsuccessful or patient reports new pain  Outcome: Progressing     Problem: INFECTION - ADULT  Goal: Absence or prevention of progression during hospitalization  Description: INTERVENTIONS:  - Assess and monitor for signs and symptoms of infection  - Monitor lab/diagnostic results  - Monitor all insertion sites, i e  indwelling lines, tubes, and drains  - Monitor endotracheal if appropriate and nasal secretions for changes in amount and color  - [place] appropriate cooling/warming therapies per order  - Administer medications as ordered  - Instruct and encourage patient and family to use good hand hygiene technique  - Identify and instruct in appropriate isolation precautions for identified infection/condition  Outcome: Progressing  Goal: Absence of fever/infection during neutropenic period  Description: INTERVENTIONS:  - Monitor WBC    Outcome: Progressing     Problem: SAFETY ADULT  Goal: Patient will remain free of falls  Description: INTERVENTIONS:  - Educate patient/family on patient safety including physical limitations  - Instruct patient to call for assistance with activity   - Consult OT/PT to assist with strengthening/mobility   - Keep Call bell within reach  - Keep bed low and locked with side rails adjusted as appropriate  - Keep care items and personal belongings within reach  - Initiate and maintain comfort rounds  - Make Fall Risk Sign visible to staff  - Offer Toileting every 2 Hours, in advance of need  - Initiate/Maintain bed alarm  - Obtain necessary fall risk management equipment:   - Apply yellow socks and bracelet for high fall risk patients  - Consider moving patient to room near nurses station  Outcome: Progressing  Goal: Maintain or return to baseline ADL function  Description: INTERVENTIONS:  -  Assess patient's ability to carry out ADLs; assess patient's baseline for ADL function and identify physical deficits which impact ability to perform ADLs (bathing, care of mouth/teeth, toileting, grooming, dressing, etc )  - Assess/evaluate cause of self-care deficits   - Assess range of motion  - Assess patient's mobility; develop plan if impaired  - Assess patient's need for assistive devices and provide as appropriate  - Encourage maximum independence but intervene and supervise when necessary  - Involve family in performance of ADLs  - Assess for home care needs following discharge   - Consider OT consult to assist with ADL evaluation and planning for discharge  - Provide patient education as appropriate  Outcome: Progressing  Goal: Maintains/Returns to pre admission functional level  Description: INTERVENTIONS:  - Perform BMAT or MOVE assessment daily    - Set and communicate daily mobility goal to care team and patient/family/caregiver  - Collaborate with rehabilitation services on mobility goals if consulted  - Perform Range of Motion 3 times a day  - Reposition patient every 2 hours    - Dangle patient 2 times a day  - Record patient progress and toleration of activity level   Outcome: Progressing     Problem: DISCHARGE PLANNING  Goal: Discharge to home or other facility with appropriate resources  Description: INTERVENTIONS:  - Identify barriers to discharge w/patient and caregiver  - Arrange for needed discharge resources and transportation as appropriate  - Identify discharge learning needs (meds, wound care, etc )  - Arrange for interpretive services to assist at discharge as needed  - Refer to Case Management Department for coordinating discharge planning if the patient needs post-hospital services based on physician/advanced practitioner order or complex needs related to functional status, cognitive ability, or social support system  Outcome: Progressing     Problem: Knowledge Deficit  Goal: Patient/family/caregiver demonstrates understanding of disease process, treatment plan, medications, and discharge instructions  Description: Complete learning assessment and assess knowledge base  Interventions:  - Provide teaching at level of understanding  - Provide teaching via preferred learning methods  Outcome: Progressing     Problem: MOBILITY - ADULT  Goal: Maintain or return to baseline ADL function  Description: INTERVENTIONS:  -  Assess patient's ability to carry out ADLs; assess patient's baseline for ADL function and identify physical deficits which impact ability to perform ADLs (bathing, care of mouth/teeth, toileting, grooming, dressing, etc )  - Assess/evaluate cause of self-care deficits   - Assess range of motion  - Assess patient's mobility; develop plan if impaired  - Assess patient's need for assistive devices and provide as appropriate  - Encourage maximum independence but intervene and supervise when necessary  - Involve family in performance of ADLs  - Assess for home care needs following discharge   - Consider OT consult to assist with ADL evaluation and planning for discharge  - Provide patient education as appropriate  Outcome: Progressing  Goal: Maintains/Returns to pre admission functional level  Description: INTERVENTIONS:  - Perform BMAT or MOVE assessment daily    - Set and communicate daily mobility goal to care team and patient/family/caregiver  - Collaborate with rehabilitation services on mobility goals if consulted  - Perform Range of Motion 3 times a day  - Reposition patient every 2 hours    - Record patient progress and toleration of activity level Outcome: Progressing     Problem: Prexisting or High Potential for Compromised Skin Integrity  Goal: Skin integrity is maintained or improved  Description: INTERVENTIONS:  - Identify patients at risk for skin breakdown  - Assess and monitor skin integrity  - Assess and monitor nutrition and hydration status  - Monitor labs   - Assess for incontinence   - Turn and reposition patient  - Assist with mobility/ambulation  - Relieve pressure over bony prominences  - Avoid friction and shearing  - Provide appropriate hygiene as needed including keeping skin clean and dry  - Evaluate need for skin moisturizer/barrier cream  - Collaborate with interdisciplinary team   - Patient/family teaching  - Consider wound care consult   Outcome: Progressing     Problem: Potential for Falls  Goal: Patient will remain free of falls  Description: INTERVENTIONS:  - Educate patient/family on patient safety including physical limitations  - Instruct patient to call for assistance with activity   - Consult OT/PT to assist with strengthening/mobility   - Keep Call bell within reach  - Keep bed low and locked with side rails adjusted as appropriate  - Keep care items and personal belongings within reach  - Initiate and maintain comfort rounds  - Make Fall Risk Sign visible to staff  - Offer Toileting every 2 Hours, in advance of need  - Initiate/Maintain bed alarm  - Obtain necessary fall risk management equipment:   - Apply yellow socks and bracelet for high fall risk patients  - Consider moving patient to room near nurses station  Outcome: Progressing     Problem: Nutrition/Hydration-ADULT  Goal: Nutrient/Hydration intake appropriate for improving, restoring or maintaining nutritional needs  Description: Monitor and assess patient's nutrition/hydration status for malnutrition  Collaborate with interdisciplinary team and initiate plan and interventions as ordered  Monitor patient's weight and dietary intake as ordered or per policy   Utilize nutrition screening tool and intervene as necessary  Determine patient's food preferences and provide high-protein, high-caloric foods as appropriate       INTERVENTIONS:  - Monitor oral intake, urinary output, labs, and treatment plans  - Assess nutrition and hydration status and recommend course of action  - Evaluate amount of meals eaten  - Assist patient with eating if necessary   - Allow adequate time for meals  - Recommend/ encourage appropriate diets, oral nutritional supplements, and vitamin/mineral supplements  - Order, calculate, and assess calorie counts as needed  - Recommend, monitor, and adjust tube feedings and TPN/PPN based on assessed needs  - Assess need for intravenous fluids  - Provide specific nutrition/hydration education as appropriate  - Include patient/family/caregiver in decisions related to nutrition  Outcome: Progressing

## 2022-02-19 NOTE — ASSESSMENT & PLAN NOTE
· Pre-Syncope  · Patient with History of recurrent falls over the last 2 days  · CTH:  No acute intracranial abnormality  Small focus of encephalomalacia within the left occipital lobe which may represent an old infarct  · Xray RLE:  No acute osseous abnormality  · Xray LLE:  No acute osseous abnormality  · Continue fall precautions   · Perform Orthostatic Blood pressures, starting tomorrow 2/17  · PT/OT evaluation: Short Term Rehab  · Case Management Consulted  · Patient requesting rehab at [facility]  Currently no beds available  Awaiting further rehab plans

## 2022-02-19 NOTE — ASSESSMENT & PLAN NOTE
· History of C-Diff infection  · Continue prophylactic PO Vancomycin in the setting of acute infection and antibiotic use  · Infectious disease following  · Continue vanco dosing through February 25th

## 2022-02-19 NOTE — ASSESSMENT & PLAN NOTE
· Suspect in the setting of acute infection   · ACS R/O'd  · HS Trop: 184--> 159 --> 179  · Patient denies any acute complaints of chest pain  · No findings on telemetry  Discontinued  · ECHO: EF 60% with grade 1 diastolic dysfunction  Mild-to-moderate aortic regurgitation with critical stenosis  · Monitor volume status closely    · Recommend outpatient follow-up with Cardiology

## 2022-02-19 NOTE — ASSESSMENT & PLAN NOTE
· POA  · Crt on admisison: 1 44  · Baseline Crt   Appears to be around 0 7-0 8 however in January 2021, creatinine was 1 22     · Creatinine today: 0 97  · Likely secondary to sepsis and poor oral intake  · S/P IV hydration  · Avoid/Limit Nephrotoxins  · Avoid hypotension and fluctuation and BP  · Continue monitor renal function

## 2022-02-19 NOTE — PLAN OF CARE
Problem: PAIN - ADULT  Goal: Verbalizes/displays adequate comfort level or baseline comfort level  Description: Interventions:  - Encourage patient to monitor pain and request assistance  - Assess pain using appropriate pain scale  - Administer analgesics based on type and severity of pain and evaluate response  - Implement non-pharmacological measures as appropriate and evaluate response  - Consider cultural and social influences on pain and pain management  - Notify physician/advanced practitioner if interventions unsuccessful or patient reports new pain  Outcome: Progressing     Problem: INFECTION - ADULT  Goal: Absence or prevention of progression during hospitalization  Description: INTERVENTIONS:  - Assess and monitor for signs and symptoms of infection  - Monitor lab/diagnostic results  - Monitor all insertion sites, i e  indwelling lines, tubes, and drains  - Monitor endotracheal if appropriate and nasal secretions for changes in amount and color  - [place] appropriate cooling/warming therapies per order  - Administer medications as ordered  - Instruct and encourage patient and family to use good hand hygiene technique  - Identify and instruct in appropriate isolation precautions for identified infection/condition  Outcome: Progressing  Goal: Absence of fever/infection during neutropenic period  Description: INTERVENTIONS:  - Monitor WBC    Outcome: Progressing     Problem: SAFETY ADULT  Goal: Patient will remain free of falls  Description: INTERVENTIONS:  - Educate patient/family on patient safety including physical limitations  - Instruct patient to call for assistance with activity   - Consult OT/PT to assist with strengthening/mobility   - Keep Call bell within reach  - Keep bed low and locked with side rails adjusted as appropriate  - Keep care items and personal belongings within reach  - Initiate and maintain comfort rounds  - Make Fall Risk Sign visible to staff  - Offer Toileting every 2 Hours, in advance of need  - Initiate/Maintain alarm  - Obtain necessary fall risk management equipment  - Apply yellow socks and bracelet for high fall risk patients  - Consider moving patient to room near nurses station  Outcome: Progressing  Goal: Maintain or return to baseline ADL function  Description: INTERVENTIONS:  -  Assess patient's ability to carry out ADLs; assess patient's baseline for ADL function and identify physical deficits which impact ability to perform ADLs (bathing, care of mouth/teeth, toileting, grooming, dressing, etc )  - Assess/evaluate cause of self-care deficits   - Assess range of motion  - Assess patient's mobility; develop plan if impaired  - Assess patient's need for assistive devices and provide as appropriate  - Encourage maximum independence but intervene and supervise when necessary  - Involve family in performance of ADLs  - Assess for home care needs following discharge   - Consider OT consult to assist with ADL evaluation and planning for discharge  - Provide patient education as appropriate  Outcome: Progressing  Goal: Maintains/Returns to pre admission functional level  Description: INTERVENTIONS:  - Perform BMAT or MOVE assessment daily    - Set and communicate daily mobility goal to care team and patient/family/caregiver  - Collaborate with rehabilitation services on mobility goals if consulted  - Perform Range of Motion 2 times a day  - Reposition patient every 3 hours    - Dangle patient 2 times a day  - Stand patient 3 times a day  - Ambulate patient 2 times a day  - Out of bed to chair 2 times a day   - Out of bed for meals 2 times a day  - Out of bed for toileting  - Record patient progress and toleration of activity level   Outcome: Progressing     Problem: DISCHARGE PLANNING  Goal: Discharge to home or other facility with appropriate resources  Description: INTERVENTIONS:  - Identify barriers to discharge w/patient and caregiver  - Arrange for needed discharge resources and transportation as appropriate  - Identify discharge learning needs (meds, wound care, etc )  - Arrange for interpretive services to assist at discharge as needed  - Refer to Case Management Department for coordinating discharge planning if the patient needs post-hospital services based on physician/advanced practitioner order or complex needs related to functional status, cognitive ability, or social support system  Outcome: Progressing     Problem: Knowledge Deficit  Goal: Patient/family/caregiver demonstrates understanding of disease process, treatment plan, medications, and discharge instructions  Description: Complete learning assessment and assess knowledge base  Interventions:  - Provide teaching at level of understanding  - Provide teaching via preferred learning methods  Outcome: Progressing     Problem: MOBILITY - ADULT  Goal: Maintain or return to baseline ADL function  Description: INTERVENTIONS:  -  Assess patient's ability to carry out ADLs; assess patient's baseline for ADL function and identify physical deficits which impact ability to perform ADLs (bathing, care of mouth/teeth, toileting, grooming, dressing, etc )  - Assess/evaluate cause of self-care deficits   - Assess range of motion  - Assess patient's mobility; develop plan if impaired  - Assess patient's need for assistive devices and provide as appropriate  - Encourage maximum independence but intervene and supervise when necessary  - Involve family in performance of ADLs  - Assess for home care needs following discharge   - Consider OT consult to assist with ADL evaluation and planning for discharge  - Provide patient education as appropriate  Outcome: Progressing  Goal: Maintains/Returns to pre admission functional level  Description: INTERVENTIONS:  - Perform BMAT or MOVE assessment daily    - Set and communicate daily mobility goal to care team and patient/family/caregiver     - Collaborate with rehabilitation services on mobility goals if consulted  - Perform Range of Motion 2 times a day  - Reposition patient every 2 hours    - Dangle patient 2 times a day  - Stand patient 2 times a day  - Ambulate patient 2 times a day  - Out of bed to chair 2 times a day   - Out of bed for meals 2 times a day  - Out of bed for toileting  - Record patient progress and toleration of activity level   Outcome: Progressing     Problem: Prexisting or High Potential for Compromised Skin Integrity  Goal: Skin integrity is maintained or improved  Description: INTERVENTIONS:  - Identify patients at risk for skin breakdown  - Assess and monitor skin integrity  - Assess and monitor nutrition and hydration status  - Monitor labs   - Assess for incontinence   - Turn and reposition patient  - Assist with mobility/ambulation  - Relieve pressure over bony prominences  - Avoid friction and shearing  - Provide appropriate hygiene as needed including keeping skin clean and dry  - Evaluate need for skin moisturizer/barrier cream  - Collaborate with interdisciplinary team   - Patient/family teaching  - Consider wound care consult   Outcome: Progressing     Problem: Potential for Falls  Goal: Patient will remain free of falls  Description: INTERVENTIONS:  - Educate patient/family on patient safety including physical limitations  - Instruct patient to call for assistance with activity   - Consult OT/PT to assist with strengthening/mobility   - Keep Call bell within reach  - Keep bed low and locked with side rails adjusted as appropriate  - Keep care items and personal belongings within reach  - Initiate and maintain comfort rounds  - Make Fall Risk Sign visible to staff  - Offer Toileting every 2 Hours, in advance of need  - Initiate/Maintain 2alarm  - Obtain necessary fall risk management equipment  - Apply yellow socks and bracelet for high fall risk patients  - Consider moving patient to room near nurses station  Outcome: Progressing

## 2022-02-19 NOTE — ASSESSMENT & PLAN NOTE
· History of severe aortic stenosis  · Previous Echo (5/2021): Ef 55% with Grade 1 diastolic dysfunction and Severe Aortic stenosis  · Repeat ECHO demonstrates EF 60% with critical AS  · Monitor I/O's and volume status  · Daily weights     · Recommend outpatient follow-up with Cardiac surgery

## 2022-02-19 NOTE — PROGRESS NOTES
Radha 43  Progress Note - Binta Painter 1950, 66 y o  female MRN: [id number]  Unit/Bed#: -01 Encounter: [id number]  Primary Care Provider: Ced Harrison MD   Date and time admitted to hospital: 2/16/2022 12:33 PM    * Severe sepsis [facility])  Assessment & Plan  · POA as evidenced by SIRS: Tachycardia ('s), Hypotension (SBP<95), Leukocytosis (WBC 23)  · Tachycardia resolved  · WBCs trending down  Currently: 7 01  · End organ Dysfunction: Acute kidney Injury (Resolved)  · Infection: Suspect worsening B/L LE cellulitis vs UTI vs Pneumonia  · Legs appear stable per patient  · CXR: negative  · UA (+) nitrites, RBCs, WBCs, bacteria  There is presence of epithelial cells as well  Urine culture: Mixed Contaminants   · Fluids: S/P 1L NSS Bolus in the ED  Will c/w LR @ 75 ml/hr  · Will hold off from sepsis protocol Fluid resuscitation due to severe aortic stenosis and heart failure  · Monitor closely for signs of Volume overload   · ABX: S/P Clindamycin in the ED  Transitioned to IV Cefepime for now and await urine culture   · Lactic: Negative  · Procalcitonin elevated @ 10 37-->6 37  · CRP elevated @ 33 2-->11 4  · CK elevated @ 2600 3-->490  · Cultures:  · BC x2: Pending  · U/A and U/C: Positive  Mixed Contaminants  · Infectious Disease following, appreciate recommendations:  · Cellulitis suspect streptococcal infection  Continue Ancef 1 G IV q 8 hours  Upon discharge, transition to cephalexin 500 mg p o  Q 6 hours to complete 7 day course through February 22nd    Acute kidney injury [facility])  Assessment & Plan  · POA  · Crt on admisison: 1 44  · Baseline Crt   Appears to be around 0 7-0 8 however in January 2021, creatinine was 1 22     · Creatinine today: 0 97  · Likely secondary to sepsis and poor oral intake  · S/P IV hydration  · Avoid/Limit Nephrotoxins  · Avoid hypotension and fluctuation and BP  · Continue monitor renal function    Elevated troponin  Assessment & Plan  · Suspect in the setting of acute infection   · ACS R/O'd  · HS Trop: 184--> 159 --> 179  · Patient denies any acute complaints of chest pain  · No findings on telemetry  Discontinued  · ECHO: EF 60% with grade 1 diastolic dysfunction  Mild-to-moderate aortic regurgitation with critical stenosis  · Monitor volume status closely  · Recommend outpatient follow-up with Cardiology    Recurrent cellulitis of lower extremity  Assessment & Plan  · Patient with history of recurrent cellulitis  · Patient states she has wound nurses following at home with frequent dressing changes and reports her legs have been "stable"  · Wound Care Consulted and appreciate their recommendations  · Continue supportive care    Elevated d-dimer  Assessment & Plan  · POA  · D-Dimer: 5 57--> 3 99  · Vasc Duplex:  No evidence of acute or chronic DVTs bilaterally  No evidence of superficial thrombophlebitis noted bilaterally  · No acute complaints of shortness of breath, tachycardia or hypoxia  · Hold off on CTA    Ambulatory dysfunction  Assessment & Plan  · Pre-Syncope  · Patient with History of recurrent falls over the last 2 days  · CTH:  No acute intracranial abnormality  Small focus of encephalomalacia within the left occipital lobe which may represent an old infarct  · Xray RLE:  No acute osseous abnormality  · Xray LLE:  No acute osseous abnormality  · Continue fall precautions   · Perform Orthostatic Blood pressures, starting tomorrow 2/17  · PT/OT evaluation: Short Term Rehab  · Case Management Consulted  · Patient requesting re[facility] at [facility]  Currently no beds available  Awaiting further rehab plans      History of Clostridium difficile colitis  Assessment & Plan  · History of C-Diff infection  · Continue prophylactic PO Vancomycin in the setting of acute infection and antibiotic use  · Infectious disease following  · Continue vanco dosing through February 25th    Severe aortic stenosis  Assessment & Plan  · History of severe aortic stenosis  · Previous Echo (2021): Ef 55% with Grade 1 diastolic dysfunction and Severe Aortic stenosis  · Repeat ECHO demonstrates EF 60% with critical AS  · Monitor I/O's and volume status  · Daily weights  · Recommend outpatient follow-up with Cardiac surgery    Idiopathic hypotension  Assessment & Plan  · Continue home medication:  · Midodrine 10 mg t i d   · Hold parameters for SBP >/= 130    Depression with anxiety  Assessment & Plan  · Continue Home Medications:  · Wellbutrin, BuSpar, and Lexapro  · EK with QT interval: 409        VTE Pharmacologic Prophylaxis: VTE Score: 5 High Risk (Score >/= 5) - Pharmacological DVT Prophylaxis Ordered: heparin  Sequential Compression Devices Ordered  Patient Centered Rounds: I performed bedside rounds with nursing staff today  Discussions with Specialists or Other Care Team Provider:  Case management    Education and Discussions with Family / Patient: Updated  (sister) at bedside  Time Spent for Care: 30 minutes  More than 50% of total time spent on counseling and coordination of care as described above  Current Length of Stay: 3 day(s)  Current Patient Status: Inpatient   Certification Statement: The patient will continue to require additional inpatient hospital stay due to Placement to acute rehab  Discharge Plan: Anticipate discharge in 48-72 hrs to rehab facility  Code Status: Level 1 - Full Code    Subjective:   Patient states she feels RA today  Patient states she feels about the same as yesterday  Patient stated she had multiple episodes of diarrhea  Patient also noted improvement in bilateral lower extremity wounds  Patient denies any active chest pain, worsening shortness of breath, abdominal pain, nausea, or vomiting      Objective:     Vitals:   Temp (24hrs), Av °F (36 1 °C), Min:96 4 °F (35 8 °C), Max:98 1 °F (36 7 °C)    Temp:  [96 4 °F (35 8 °C)-98 1 °F (36 7 °C)] 96 5 °F (35 8 °C)  HR: [68-74] 74  Resp:  [16-18] 18  BP: (100-125)/(52-69) 125/69  SpO2:  [97 %-100 %] 100 %  Body mass index is 32 81 kg/m²  Input and Output Summary (last 24 hours): Intake/Output Summary (Last 24 hours) at 2/19/2022 1059  Last data filed at 2/19/2022 1646  Gross per 24 hour   Intake --   Output 900 ml   Net -900 ml       Physical Exam:   Physical Exam  Vitals and nursing note reviewed  Constitutional:       General: She is not in acute distress  Appearance: She is ill-appearing  HENT:      Head: Normocephalic  Nose: Nose normal  No congestion  Mouth/Throat:      Mouth: Mucous membranes are moist       Pharynx: Oropharynx is clear  Cardiovascular:      Rate and Rhythm: Normal rate and regular rhythm  Pulses: Normal pulses  Heart sounds: Murmur heard  Pulmonary:      Effort: Pulmonary effort is normal  No respiratory distress  Breath sounds: Decreased breath sounds present  Abdominal:      General: Bowel sounds are normal  There is no distension  Palpations: Abdomen is soft  Tenderness: There is no abdominal tenderness  Musculoskeletal:         General: Tenderness (B/L LE) present  Normal range of motion  Cervical back: Normal range of motion  Right lower leg: Edema present  Left lower leg: Edema present  Skin:     General: Skin is warm and dry  Capillary Refill: Capillary refill takes less than 2 seconds  Findings: Erythema (B/L LE) and lesion (B/L LE) present  Neurological:      Mental Status: She is alert and oriented to person, place, and time  Mental status is at baseline  Motor: Weakness (Generalized) present  Psychiatric:         Attention and Perception: Attention normal          Mood and Affect: Mood normal          Speech: Speech normal          Behavior: Behavior is cooperative           Cognition and Memory: Cognition normal          Judgment: Judgment normal          Additional Data:     Labs:  Results from last 7 days   Lab Units 02/19/22  0554   WBC Thousand/uL 7 01   HEMOGLOBIN g/dL 8 2*   HEMATOCRIT % 27 5*   PLATELETS Thousands/uL 180   NEUTROS PCT % 67   LYMPHS PCT % 18   MONOS PCT % 7   EOS PCT % 6     Results from last 7 days   Lab Units 02/19/22  0554 02/18/22  0612 02/17/22  0444   SODIUM mmol/L 139   < > 135   POTASSIUM mmol/L 4 0   < > 3 7   CHLORIDE mmol/L 107   < > 102   CO2 mmol/L 25   < > 22   BUN mg/dL 22*   < > 31*   CREATININE mg/dL 0 97   < > 1 22*   ANION GAP mmol/L 7   < > 11   CALCIUM mg/dL 8 7   < > 8 2*   ALBUMIN g/dL  --   --  2 6*   TOTAL BILIRUBIN mg/dL  --   --  0 36   ALK PHOS U/L  --   --  54 3   ALT U/L  --   --  20   AST U/L  --   --  53*   GLUCOSE RANDOM mg/dL 71   < > 65    < > = values in this interval not displayed  Results from last 7 days   Lab Units 02/18/22  0612 02/17/22  0444 02/16/22  1620   LACTIC ACID mmol/L  --   --  0 8   PROCALCITONIN ng/ml 6 37* 10 37*  --        Lines/Drains:  Invasive Devices  Report    Peripheral Intravenous Line            Peripheral IV 02/18/22 Left;Ventral (anterior) Hand <1 day                      Imaging: No pertinent imaging reviewed  Recent Cultures (last 7 days):   Results from last 7 days   Lab Units 02/17/22  0352 02/16/22  1410   BLOOD CULTURE   --  No Growth at 48 hrs  No Growth at 48 hrs     URINE CULTURE  20,000-29,000 cfu/ml   --        Last 24 Hours Medication List:   Current Facility-Administered Medications   Medication Dose Route Frequency Provider Last Rate    acetaminophen  650 mg Oral Q6H PRN ROSCOE Thibodeaux      buPROPion  300 mg Oral Daily ROSCOE Thibodeaux      busPIRone  15 mg Oral BID ROSCOE Thibodeaux      cefazolin  1,000 mg Intravenous Q8H Joe Borges DO 1,000 mg (02/19/22 0215)    cholecalciferol  1,000 Units Oral Daily ROSCOE Thibodeaux      escitalopram  10 mg Oral Daily ROSCOE Thibodeaux      ferrous sulfate  650 mg Oral Daily With Breakfast ROSCOE Thibodeaux      heparin (porcine) 5,000 Units Subcutaneous [facility] ROSCOE Lara      midodrine  10 mg Oral TID AC ROSCOE Lara      multivitamin stress formula  1 tablet Oral Daily ROSCOE Lara      ondansetron  4 mg Intravenous Q6H PRN ROSCOE Lara      saccharomyces boulardii  250 mg Oral Daily ROSCOE Lara      vancomycin  125 mg Oral Q12H [address] Danelle Sanchez PA-C          Today, Patient Was Seen By: ROSCOE Lara    **Please Note: This note may have been constructed using a voice recognition system  **

## 2022-02-20 LAB
ANION GAP SERPL CALCULATED.3IONS-SCNC: 6 MMOL/L (ref 4–13)
BASOPHILS # BLD AUTO: 0.04 THOUSANDS/ΜL (ref 0–0.1)
BASOPHILS NFR BLD AUTO: 1 % (ref 0–1)
BUN SERPL-MCNC: 16 MG/DL (ref 6–20)
CALCIUM SERPL-MCNC: 8.7 MG/DL (ref 8.4–10.2)
CHLORIDE SERPL-SCNC: 107 MMOL/L (ref 96–108)
CO2 SERPL-SCNC: 27 MMOL/L (ref 22–33)
CREAT SERPL-MCNC: 0.91 MG/DL (ref 0.4–1.1)
EOSINOPHIL # BLD AUTO: 0.38 THOUSAND/ΜL (ref 0–0.61)
EOSINOPHIL NFR BLD AUTO: 4 % (ref 0–6)
ERYTHROCYTE [DISTWIDTH] IN BLOOD BY AUTOMATED COUNT: 15.6 % (ref 11.6–15.1)
GFR SERPL CREATININE-BSD FRML MDRD: 62 ML/MIN/1.73SQ M
GLUCOSE SERPL-MCNC: 76 MG/DL (ref 65–140)
HCT VFR BLD AUTO: 26.8 % (ref 34.8–46.1)
HGB BLD-MCNC: 8.1 G/DL (ref 11.5–15.4)
IMM GRANULOCYTES # BLD AUTO: 0.1 THOUSAND/UL (ref 0–0.2)
IMM GRANULOCYTES NFR BLD AUTO: 1 % (ref 0–2)
LYMPHOCYTES # BLD AUTO: 1.77 THOUSANDS/ΜL (ref 0.6–4.47)
LYMPHOCYTES NFR BLD AUTO: 20 % (ref 14–44)
MCH RBC QN AUTO: 27.2 PG (ref 26.8–34.3)
MCHC RBC AUTO-ENTMCNC: 30.2 G/DL (ref 31.4–37.4)
MCV RBC AUTO: 90 FL (ref 82–98)
MONOCYTES # BLD AUTO: 0.55 THOUSAND/ΜL (ref 0.17–1.22)
MONOCYTES NFR BLD AUTO: 6 % (ref 4–12)
NEUTROPHILS # BLD AUTO: 5.86 THOUSANDS/ΜL (ref 1.85–7.62)
NEUTS SEG NFR BLD AUTO: 68 % (ref 43–75)
NRBC BLD AUTO-RTO: 0 /100 WBCS
PLATELET # BLD AUTO: 213 THOUSANDS/UL (ref 149–390)
PMV BLD AUTO: 11 FL (ref 8.9–12.7)
POTASSIUM SERPL-SCNC: 4 MMOL/L (ref 3.5–5)
RBC # BLD AUTO: 2.98 MILLION/UL (ref 3.81–5.12)
SODIUM SERPL-SCNC: 140 MMOL/L (ref 133–145)
WBC # BLD AUTO: 8.7 THOUSAND/UL (ref 4.31–10.16)

## 2022-02-20 PROCEDURE — 99232 SBSQ HOSP IP/OBS MODERATE 35: CPT

## 2022-02-20 PROCEDURE — 80048 BASIC METABOLIC PNL TOTAL CA: CPT

## 2022-02-20 PROCEDURE — 85025 COMPLETE CBC W/AUTO DIFF WBC: CPT

## 2022-02-20 RX ADMIN — BUPROPION 300 MG: 150 TABLET, EXTENDED RELEASE ORAL at 09:06

## 2022-02-20 RX ADMIN — BUSPIRONE HYDROCHLORIDE 15 MG: 5 TABLET ORAL at 09:06

## 2022-02-20 RX ADMIN — MIDODRINE HYDROCHLORIDE 10 MG: 5 TABLET ORAL at 17:48

## 2022-02-20 RX ADMIN — HEPARIN SODIUM 5000 UNITS: 5000 INJECTION INTRAVENOUS; SUBCUTANEOUS at 17:48

## 2022-02-20 RX ADMIN — HEPARIN SODIUM 5000 UNITS: 5000 INJECTION INTRAVENOUS; SUBCUTANEOUS at 22:58

## 2022-02-20 RX ADMIN — CEFAZOLIN SODIUM 1000 MG: 1 SOLUTION INTRAVENOUS at 01:28

## 2022-02-20 RX ADMIN — CEFAZOLIN SODIUM 1000 MG: 1 SOLUTION INTRAVENOUS at 17:48

## 2022-02-20 RX ADMIN — ESCITALOPRAM OXALATE 10 MG: 20 TABLET ORAL at 09:06

## 2022-02-20 RX ADMIN — HEPARIN SODIUM 5000 UNITS: 5000 INJECTION INTRAVENOUS; SUBCUTANEOUS at 07:14

## 2022-02-20 RX ADMIN — Medication 1000 UNITS: at 09:07

## 2022-02-20 RX ADMIN — CEFAZOLIN SODIUM 1000 MG: 1 SOLUTION INTRAVENOUS at 09:07

## 2022-02-20 RX ADMIN — BUSPIRONE HYDROCHLORIDE 15 MG: 5 TABLET ORAL at 17:48

## 2022-02-20 RX ADMIN — Medication 250 MG: at 09:07

## 2022-02-20 RX ADMIN — Medication 125 MG: at 09:20

## 2022-02-20 RX ADMIN — MIDODRINE HYDROCHLORIDE 10 MG: 5 TABLET ORAL at 12:05

## 2022-02-20 RX ADMIN — MIDODRINE HYDROCHLORIDE 10 MG: 5 TABLET ORAL at 07:13

## 2022-02-20 RX ADMIN — FERROUS SULFATE TAB 325 MG (65 MG ELEMENTAL FE) 650 MG: 325 (65 FE) TAB at 09:06

## 2022-02-20 RX ADMIN — Medication 125 MG: at 21:13

## 2022-02-20 RX ADMIN — B-COMPLEX W/ C & FOLIC ACID TAB 1 TABLET: TAB at 09:07

## 2022-02-20 NOTE — PROGRESS NOTES
Agus [facility]  64   Progress Note - Joslyn Dong 1950, 66 y o  female MRN: [id number]  Unit/Bed#: -01 Encounter: [id number]  Primary Care Provider: Yaron Mishra MD   Date and time admitted to hospital: 2/16/2022 12:33 PM    * Severe sepsis [facility])  Assessment & Plan  · POA as evidenced by SIRS: Tachycardia ('s), Hypotension (SBP<95), Leukocytosis (WBC 23)  · Tachycardia resolved  · WBCs trending down  Most recent: 8 7  · End organ Dysfunction: Acute kidney Injury (Resolved)  · Infection: Suspect worsening B/L LE cellulitis vs UTI vs Pneumonia  · Legs appear stable per patient  · CXR: negative  · UA (+) nitrites, RBCs, WBCs, bacteria  There is presence of epithelial cells as well  Urine culture: Mixed Contaminants   · Fluids: S/P 1L NSS Bolus in the ED  Will c/w LR @ 75 ml/hr  · Will hold off from sepsis protocol Fluid resuscitation due to severe aortic stenosis and heart failure  · Monitor closely for signs of Volume overload   · ABX: S/P Clindamycin in the ED  Transitioned to IV Cefepime for now and await urine culture   · Lactic: Negative  · Procalcitonin elevated @ 10 37-->6 37  · CRP elevated @ 33 2-->11 4  · CK elevated @ 2600 3-->490  · Cultures:  · BC x2: Pending  · U/A and U/C: Positive  Mixed Contaminants  · Infectious Disease following, appreciate recommendations:  · Cellulitis suspect streptococcal infection  Continue Ancef 1 G IV q 8 hours  Upon discharge, transition to cephalexin 500 mg p o  Q 6 hours to complete 7 day course through February 22nd    Ambulatory dysfunction  Assessment & Plan  · Pre-Syncope  · Patient with History of recurrent falls over the last 2 days  · CTH:  No acute intracranial abnormality  Small focus of encephalomalacia within the left occipital lobe which may represent an old infarct    · Xray RLE:  No acute osseous abnormality  · Xray LLE:  No acute osseous abnormality  · Continue fall precautions   · Perform Orthostatic Blood pressures, starting tomorrow 2/17  · PT/OT evaluation: Short Term Rehab  · Case Management Consulted  · Patient medically clear for discharge  · Patient requesting rehab at [facility]  Currently no beds available  Awaiting further rehab plans  Elevated troponin  Assessment & Plan  · Suspect in the setting of acute infection   · ACS R/O'd  · HS Trop: 184--> 159 --> 179  · Patient denies any acute complaints of chest pain  · No findings on telemetry  Discontinued  · ECHO: EF 60% with grade 1 diastolic dysfunction  Mild-to-moderate aortic regurgitation with critical stenosis  · Monitor volume status closely  · Recommend outpatient follow-up with Cardiology    Acute kidney injury [facility])  Assessment & Plan  · POA  Resolved   · Crt on admisison: 1 44  · Baseline Crt  Appears to be around 0 7-0 8 however in January 2021, creatinine was 1 22     · Creatinine today: 0 91  · Likely secondary to sepsis and poor oral intake  · S/P IV hydration  · Avoid/Limit Nephrotoxins  · Avoid hypotension and fluctuation and BP  · Continue monitor renal function    Recurrent cellulitis of lower extremity  Assessment & Plan  · Patient with history of recurrent cellulitis  · Patient states she has wound nurses following at home with frequent dressing changes and reports her legs have been "stable"  · Wound Care Consulted and appreciate their recommendations  · Continue supportive care    Elevated d-dimer  Assessment & Plan  · POA  · D-Dimer: 5 57--> most recent 3 99  · Vasc Duplex:  No evidence of acute or chronic DVTs bilaterally  No evidence of superficial thrombophlebitis noted bilaterally  · No acute complaints of shortness of breath, tachycardia or hypoxia      · Hold off on CTA    History of Clostridium difficile colitis  Assessment & Plan  · History of C-Diff infection  · Continue prophylactic PO Vancomycin in the setting of acute infection and antibiotic use  · Infectious disease following  · Continue vanco dosing through February 25th    Severe aortic stenosis  Assessment & Plan  · History of severe aortic stenosis  · Previous Echo (5/2021): Ef 55% with Grade 1 diastolic dysfunction and Severe Aortic stenosis  · Repeat ECHO demonstrates EF 60% with critical AS  · Monitor I/O's and volume status  · Daily weights  · Ambulatory referral for cardiac surgery placed    Idiopathic hypotension  Assessment & Plan  · BP reviewed and is stable  · Continue home medication:  · Midodrine 10 mg t i d   · Hold parameters for SBP >/= 130    Depression with anxiety  Assessment & Plan  · Continue Home Medications:  · Wellbutrin, BuSpar, and Lexapro  · EK with QT interval: 409      VTE Pharmacologic Prophylaxis: VTE Score: 5 High Risk (Score >/= 5) - Pharmacological DVT Prophylaxis Ordered: heparin  Sequential Compression Devices Ordered  Patient Centered Rounds: I performed bedside rounds with nursing staff today  Discussions with Specialists or Other Care Team Provider:  Case management    Education and Discussions with Family / Patient: Updated  (sister) via phone  Time Spent for Care: 30 minutes  More than 50% of total time spent on counseling and coordination of care as described above  Current Length of Stay: 4 day(s)  Current Patient Status: Inpatient   Certification Statement: The patient will continue to require additional inpatient hospital stay due to Placement to short-term rehab  Discharge Plan: Anticipate discharge in 48-72 hrs to rehab facility  Code Status: Level 1 - Full Code    Subjective:   Patient states she feels okay  Patient stated  this morning for lab work, the aide made the patient flinch and then yelled at her  Patient was concerned with right wrist secondary to lab stick  Patient also complained of continued diarrhea and now mild right heel pain  Patient denies any chest pain, worsening shortness of breath, abdominal pain, nausea, or vomiting      Objective:     Vitals:   Temp (24hrs), Av 4 °F (36 3 °C), Min:96 8 °F (36 °C), Max:98 1 °F (36 7 °C)    Temp:  [96 8 °F (36 °C)-98 1 °F (36 7 °C)] 98 1 °F (36 7 °C)  HR:  [69-71] 71  Resp:  [16-18] 18  BP: (109-132)/(49-69) 132/55  SpO2:  [97 %-99 %] 97 %  Body mass index is 32 71 kg/m²  Input and Output Summary (last 24 hours):   No intake or output data in the 24 hours ending 22 1118    Physical Exam:   Physical Exam  Vitals and nursing note reviewed  Constitutional:       General: She is not in acute distress  Appearance: She is obese  She is ill-appearing  HENT:      Head: Normocephalic  Nose: Nose normal  No congestion  Mouth/Throat:      Mouth: Mucous membranes are moist       Pharynx: Oropharynx is clear  Cardiovascular:      Rate and Rhythm: Normal rate and regular rhythm  Pulses: Normal pulses  Heart sounds: Murmur heard  Pulmonary:      Effort: Pulmonary effort is normal  No respiratory distress  Breath sounds: Decreased breath sounds present  Abdominal:      General: Bowel sounds are normal  There is no distension  Palpations: Abdomen is soft  Tenderness: There is no abdominal tenderness  Musculoskeletal:         General: Tenderness (B/L LE, Right Wrist) present  Normal range of motion  Cervical back: Normal range of motion  Right lower leg: Edema present  Left lower leg: Edema present  Skin:     General: Skin is warm and dry  Capillary Refill: Capillary refill takes less than 2 seconds  Findings: Erythema (B/L LE, Improving) present  Neurological:      Mental Status: She is alert and oriented to person, place, and time  Mental status is at baseline  Motor: Weakness (B/L LE) present  Psychiatric:         Attention and Perception: Attention normal          Mood and Affect: Mood normal          Speech: Speech normal          Behavior: Behavior is cooperative           Judgment: Judgment normal           Additional Data: Labs:  Results from last 7 days   Lab Units 02/20/22  0621   WBC Thousand/uL 8 70   HEMOGLOBIN g/dL 8 1*   HEMATOCRIT % 26 8*   PLATELETS Thousands/uL 213   NEUTROS PCT % 68   LYMPHS PCT % 20   MONOS PCT % 6   EOS PCT % 4     Results from last 7 days   Lab Units 02/20/22  0621 02/18/22  0612 02/17/22  0444   SODIUM mmol/L 140   < > 135   POTASSIUM mmol/L 4 0   < > 3 7   CHLORIDE mmol/L 107   < > 102   CO2 mmol/L 27   < > 22   BUN mg/dL 16   < > 31*   CREATININE mg/dL 0 91   < > 1 22*   ANION GAP mmol/L 6   < > 11   CALCIUM mg/dL 8 7   < > 8 2*   ALBUMIN g/dL  --   --  2 6*   TOTAL BILIRUBIN mg/dL  --   --  0 36   ALK PHOS U/L  --   --  54 3   ALT U/L  --   --  20   AST U/L  --   --  53*   GLUCOSE RANDOM mg/dL 76   < > 65    < > = values in this interval not displayed  Results from last 7 days   Lab Units 02/18/22  0612 02/17/22  0444 02/16/22  1620   LACTIC ACID mmol/L  --   --  0 8   PROCALCITONIN ng/ml 6 37* 10 37*  --        Lines/Drains:  Invasive Devices  Report    Peripheral Intravenous Line            Peripheral IV 02/18/22 Left;Ventral (anterior) Hand 1 day    Peripheral IV 02/20/22 Left;Proximal;Ventral (anterior) Forearm <1 day                      Imaging: No pertinent imaging reviewed  Recent Cultures (last 7 days):   Results from last 7 days   Lab Units 02/17/22  0352 02/16/22  1410   BLOOD CULTURE   --  No Growth at 72 hrs  No Growth at 72 hrs     URINE CULTURE  20,000-29,000 cfu/ml   --        Last 24 Hours Medication List:   Current Facility-Administered Medications   Medication Dose Route Frequency Provider Last Rate    acetaminophen  650 mg Oral Q6H PRN ROSCOE García      buPROPion  300 mg Oral Daily ROSCOE García      busPIRone  15 mg Oral BID ROSCOE García      cefazolin  1,000 mg Intravenous Q8H Joe Borges DO 1,000 mg (02/20/22 0907)    cholecalciferol  1,000 Units Oral Daily ROSCOE García      escitalopram  10 mg Oral Daily Sim Otero, PATRICENP  ferrous sulfate  650 mg Oral Daily With Breakfast ROSCOE Kirby      heparin (porcine)  5,000 Units Subcutaneous [facility] ROSCOE Kirby      midodrine  10 mg Oral TID AC ROSCOE Kirby      multivitamin stress formula  1 tablet Oral Daily ROSCOE Kirby      ondansetron  4 mg Intravenous Q6H PRN ROSCOE Kirby      saccharomyces boulardii  250 mg Oral Daily ROSCOE Kirby      vancomycin  125 mg Oral Q12H [address] Markell Tran PA-C          Today, Patient Was Seen By: ROSCOE Kirby    **Please Note: This note may have been constructed using a voice recognition system  **

## 2022-02-20 NOTE — ASSESSMENT & PLAN NOTE
· POA as evidenced by SIRS: Tachycardia ('s), Hypotension (SBP<95), Leukocytosis (WBC 23)  · Tachycardia resolved  · WBCs trending down  Most recent: 8 7  · End organ Dysfunction: Acute kidney Injury (Resolved)  · Infection: Suspect worsening B/L LE cellulitis vs UTI vs Pneumonia  · Legs appear stable per patient  · CXR: negative  · UA (+) nitrites, RBCs, WBCs, bacteria  There is presence of epithelial cells as well  Urine culture: Mixed Contaminants   · Fluids: S/P 1L NSS Bolus in the ED  Will c/w LR @ 75 ml/hr  · Will hold off from sepsis protocol Fluid resuscitation due to severe aortic stenosis and heart failure  · Monitor closely for signs of Volume overload   · ABX: S/P Clindamycin in the ED  Transitioned to IV Cefepime for now and await urine culture   · Lactic: Negative  · Procalcitonin elevated @ 10 37-->6 37  · CRP elevated @ 33 2-->11 4  · CK elevated @ 2600 3-->490  · Cultures:  · BC x2: Pending  · U/A and U/C: Positive  Mixed Contaminants  · Infectious Disease following, appreciate recommendations:  · Cellulitis suspect streptococcal infection  Continue Ancef 1 G IV q 8 hours    Upon discharge, transition to cephalexin 500 mg p o  Q 6 hours to complete 7 day course through February 22nd

## 2022-02-20 NOTE — ASSESSMENT & PLAN NOTE
· Pre-Syncope  · Patient with History of recurrent falls over the last 2 days  · CTH:  No acute intracranial abnormality  Small focus of encephalomalacia within the left occipital lobe which may represent an old infarct  · Xray RLE:  No acute osseous abnormality  · Xray LLE:  No acute osseous abnormality  · Continue fall precautions   · Perform Orthostatic Blood pressures, starting tomorrow 2/17  · PT/OT evaluation: Short Term Rehab  · Case Management Consulted  · Patient medically clear for discharge  · Patient requesting rehab at [facility]  Currently no beds available  Awaiting further rehab plans

## 2022-02-20 NOTE — ASSESSMENT & PLAN NOTE
· BP reviewed and is stable  · Continue home medication:  · Midodrine 10 mg t i d   · Hold parameters for SBP >/= 130

## 2022-02-20 NOTE — ASSESSMENT & PLAN NOTE
· POA  · D-Dimer: 5 57--> most recent 3 99  · Vasc Duplex:  No evidence of acute or chronic DVTs bilaterally  No evidence of superficial thrombophlebitis noted bilaterally  · No acute complaints of shortness of breath, tachycardia or hypoxia      · Hold off on CTA

## 2022-02-20 NOTE — ASSESSMENT & PLAN NOTE
· POA  Resolved   · Crt on admisison: 1 44  · Baseline Crt   Appears to be around 0 7-0 8 however in January 2021, creatinine was 1 22     · Creatinine today: 0 91  · Likely secondary to sepsis and poor oral intake  · S/P IV hydration  · Avoid/Limit Nephrotoxins  · Avoid hypotension and fluctuation and BP  · Continue monitor renal function

## 2022-02-21 LAB
BACTERIA BLD CULT: NORMAL
BACTERIA BLD CULT: NORMAL
FLUAV RNA RESP QL NAA+PROBE: NEGATIVE
FLUBV RNA RESP QL NAA+PROBE: NEGATIVE
RSV RNA RESP QL NAA+PROBE: NEGATIVE
SARS-COV-2 RNA RESP QL NAA+PROBE: NEGATIVE

## 2022-02-21 PROCEDURE — 99232 SBSQ HOSP IP/OBS MODERATE 35: CPT | Performed by: INTERNAL MEDICINE

## 2022-02-21 PROCEDURE — 0241U HB NFCT DS VIR RESP RNA 4 TRGT: CPT | Performed by: INTERNAL MEDICINE

## 2022-02-21 PROCEDURE — 99232 SBSQ HOSP IP/OBS MODERATE 35: CPT

## 2022-02-21 RX ADMIN — MIDODRINE HYDROCHLORIDE 10 MG: 5 TABLET ORAL at 08:52

## 2022-02-21 RX ADMIN — FERROUS SULFATE TAB 325 MG (65 MG ELEMENTAL FE) 650 MG: 325 (65 FE) TAB at 08:51

## 2022-02-21 RX ADMIN — ACETAMINOPHEN 650 MG: 325 TABLET, FILM COATED ORAL at 08:54

## 2022-02-21 RX ADMIN — B-COMPLEX W/ C & FOLIC ACID TAB 1 TABLET: TAB at 08:52

## 2022-02-21 RX ADMIN — Medication 125 MG: at 22:50

## 2022-02-21 RX ADMIN — Medication 125 MG: at 08:55

## 2022-02-21 RX ADMIN — Medication 250 MG: at 08:52

## 2022-02-21 RX ADMIN — CEFAZOLIN SODIUM 1000 MG: 1 SOLUTION INTRAVENOUS at 08:55

## 2022-02-21 RX ADMIN — BUPROPION 300 MG: 150 TABLET, EXTENDED RELEASE ORAL at 08:51

## 2022-02-21 RX ADMIN — MIDODRINE HYDROCHLORIDE 10 MG: 5 TABLET ORAL at 17:57

## 2022-02-21 RX ADMIN — HEPARIN SODIUM 5000 UNITS: 5000 INJECTION INTRAVENOUS; SUBCUTANEOUS at 13:53

## 2022-02-21 RX ADMIN — HEPARIN SODIUM 5000 UNITS: 5000 INJECTION INTRAVENOUS; SUBCUTANEOUS at 05:54

## 2022-02-21 RX ADMIN — MIDODRINE HYDROCHLORIDE 10 MG: 5 TABLET ORAL at 11:36

## 2022-02-21 RX ADMIN — HEPARIN SODIUM 5000 UNITS: 5000 INJECTION INTRAVENOUS; SUBCUTANEOUS at 22:50

## 2022-02-21 RX ADMIN — CEFAZOLIN SODIUM 1000 MG: 1 SOLUTION INTRAVENOUS at 17:57

## 2022-02-21 RX ADMIN — CEFAZOLIN SODIUM 1000 MG: 1 SOLUTION INTRAVENOUS at 02:30

## 2022-02-21 RX ADMIN — ESCITALOPRAM OXALATE 10 MG: 20 TABLET ORAL at 08:52

## 2022-02-21 RX ADMIN — BUSPIRONE HYDROCHLORIDE 15 MG: 5 TABLET ORAL at 17:57

## 2022-02-21 RX ADMIN — Medication 1000 UNITS: at 08:52

## 2022-02-21 RX ADMIN — BUSPIRONE HYDROCHLORIDE 15 MG: 5 TABLET ORAL at 08:51

## 2022-02-21 NOTE — ASSESSMENT & PLAN NOTE
· RESOLVED  · POA as evidenced by SIRS: Tachycardia ('s), Hypotension (SBP<95), Leukocytosis (WBC 23)  · Tachycardia resolved  · WBCs trended down since stabilized  · End organ Dysfunction: Acute kidney Injury (Resolved)  · Infection: Suspect worsening B/L LE cellulitis vs UTI vs Pneumonia  · Legs appear stable per patient  · CXR: negative  · UA (+) nitrites, RBCs, WBCs, bacteria  There is presence of epithelial cells as well  · Urine culture: Mixed Contaminants   · Fluids: S/P 1L NSS Bolus in the ED  LR Fluids Discontinued  · Will hold off from sepsis protocol Fluid resuscitation due to severe aortic stenosis and heart failure  · Monitor closely for signs of Volume overload   · ABX: S/P Clindamycin in the ED  Transitioned to IV Cefepime  Changed to Ancef by ID  · Lactic: Negative  · MRSA: Positive  · Procalcitonin elevated @ 10 37-->6 37->0 58, much improved  · CRP elevated @ 33 2-->11 4, trended down  · CK elevated @ 2600 3-->490, trended down  · Cultures:  · BC x2: Pending  · U/A and U/C: Mixed Contaminants  · Infectious Disease following, appreciate recommendations:  · Cellulitis suspect streptococcal infection      · Continue Ancef 1 G IV q 8 hours while inpatient  · Upon discharge, transition to cephalexin 500 mg p o  Q 6 hours to complete 7 day course through February 22nd

## 2022-02-21 NOTE — ASSESSMENT & PLAN NOTE
· Pre-Syncope  · Patient with History of recurrent falls over the last 2 days  · CTH:  No acute intracranial abnormality  Small focus of encephalomalacia within the left occipital lobe which may represent an old infarct  · Xray RLE:  No acute osseous abnormality  · Xray LLE:  No acute osseous abnormality  · Continue fall precautions   · Perform Orthostatic Blood pressures, starting tomorrow 2/17  · PT/OT evaluation: Short Term Rehab  · Case Management Consulted  · Patient medically clear for discharge  · Patient excepted at [facility]  Plan for discharge today 2/22  Pickup time pending

## 2022-02-21 NOTE — ASSESSMENT & PLAN NOTE
· History of severe aortic stenosis  · Previous Echo (5/2021): Ef 55% with Grade 1 diastolic dysfunction and Severe Aortic stenosis  · Repeat ECHO demonstrates EF 60% with critical AS  · Monitor I/O's and volume status  · Daily weights     · Ambulatory referral for cardiac surgery placed

## 2022-02-21 NOTE — PROGRESS NOTES
Progress Note - Infectious Disease   Arlet Rai 66 y o  female MRN: [id number]  Unit/Bed#: -01 Encounter: [id number]    Impression/Recommendations:  1  SIRS, POA:  With tachycardia, leukocytosis  Patient was mildly hypotensive but this has improved  Consider lower extremity cellulitis as a possible source  Admission blood cultures remain negative  WBC has quickly normalized and procalcitonin is trending downward  · Antibiotic therapy as stated below  · Supportive care per primary team    2  Cellulitis bilateral lower extremities: In the setting of chronic lower extremity wounds and lymphedema  This is the presumed source of SIRS  Noted prior wound cultures from July of 2020 had polymicrobial growth including Pseudomonas, ESBL Klebsiella and Proteus  Patient remains afebrile and WBC is decreasing  · Improving with cefazolin 1g IV q 8 hours- I suspect streptococcal infection  · Upon discharge, transition to cephalexin 500 mg p o  Q 6 hours to complete 7 day course total thru Feb 22nd  · Wound care service is following- their recommendations are noted  · Lower extremity elevation advised as tolerated  · Monitor clinical response, temperature curve  3  AMADOU (resolved)  · Renal dose medications, avoid nephrotoxins    4  History of C difficile colitis in Aug 2020  · Continue vancomycin oral 125mg po q12 hours for C diff prophylaxis thru Feb 25th  5  Multiple antibiotic allergies are noted but patient has previously tolerated cephalexin, cefpodoxime and cefepime  My recommendations were discussed with the patient who verbalized understanding  ID service will follow       Antibiotics: cefazolin D5, vanco po D5, systemic ABx D6  Scheduled Meds:  Current Facility-Administered Medications   Medication Dose Route Frequency Provider Last Rate    acetaminophen  650 mg Oral Q6H PRN Shirley Courser, CRSAYDA      buPROPion  300 mg Oral Daily Shirley Courser, ROSCOE      busPIRone  15 mg Oral BID Amrit ROSCOE Armenta      cefazolin  1,000 mg Intravenous Q8H Joe Borges DO 1,000 mg (22 0855)    cholecalciferol  1,000 Units Oral Daily ROSCOE Vergara      escitalopram  10 mg Oral Daily ROSCOE Vergara      ferrous sulfate  650 mg Oral Daily With Breakfast ROSCOE Vergara      heparin (porcine)  5,000 Units Subcutaneous [facility] ROSCOE Vergara      midodrine  10 mg Oral TID AC ROSCOE Vergara      multivitamin stress formula  1 tablet Oral Daily ROSCOE Vergara      ondansetron  4 mg Intravenous Q6H PRN ROSCOE Vergara      saccharomyces boulardii  250 mg Oral Daily ROSCOE Vergara      vancomycin  125 mg Oral Q12H [address] Delilah Pandya PA-C       Continuous Infusions:   PRN Meds:   acetaminophen    ondansetron    Subjective:  Patient reports less pain and swelling from her leg wounds  She denies fever, chills, vomiting, diarrhea, shortness of breath, itching  She is tolerating current antibiotic therapy  Objective:  Vitals:  Temp:  [96 9 °F (36 1 °C)-97 2 °F (36 2 °C)] 96 9 °F (36 1 °C)  HR:  [69-72] 72  Resp:  [16] 16  BP: (117-118)/(61-71) 118/61  SpO2:  [100 %] 100 %  Temp (24hrs), Av 1 °F (36 2 °C), Min:96 9 °F (36 1 °C), Max:97 2 °F (36 2 °C)  Current: Temperature: (!) 96 9 °F (36 1 °C)  Physical Exam:   General Appearance:  Alert, awake, nontoxic, no acute distress   ENT: Oropharynx moist without lesions   Lungs:   Clear to auscultation bilaterally; respirations unlabored   Heart:  S1, S2, RRR, 3/6 systolic murmur heard throughout precordium   Abdomen:   Soft, non-tender, non-distended   : Purewick catheter present draining urine   Extremities: Mild bilateral lower extremity lymphedema    Neurologic: AAO to person, surroundings, conversant, fluent speech   Skin: Multiple dry scabs of lower extremities, venous stasis changes noted but no significant erythema or tenderness to palpation  No purulent drainage is noted       Labs, Cultures, Imaging, & Other studies: All pertinent labs, cultures and imaging studies were personally reviewed  Results from last 7 days   Lab Units 02/20/22  0621 02/19/22  0554 02/18/22  0612   WBC Thousand/uL 8 70 7 01 9 30   HEMOGLOBIN g/dL 8 1* 8 2* 7 8*   PLATELETS Thousands/uL 213 180 186     Results from last 7 days   Lab Units 02/20/22  0621 02/19/22  0554 02/19/22  0554 02/18/22  0612 02/18/22  0612 02/17/22  0444 02/17/22  0444 02/16/22  1410 02/16/22  1410   SODIUM mmol/L 140  --  139  --  137   < > 135   < > 128*   POTASSIUM mmol/L 4 0   < > 4 0   < > 3 7   < > 3 7   < > 3 6   CHLORIDE mmol/L 107   < > 107   < > 106   < > 102   < > 93*   CO2 mmol/L 27   < > 25   < > 22   < > 22   < > 23   BUN mg/dL 16   < > 22*   < > 29*   < > 31*   < > 31*   CREATININE mg/dL 0 91   < > 0 97   < > 1 06   < > 1 22*   < > 1 45*   EGFR ml/min/1 73sq m 62   < > 58   < > 52   < > 44   < > 35   CALCIUM mg/dL 8 7   < > 8 7   < > 8 8   < > 8 2*   < > 9 4   AST U/L  --   --   --   --   --   --  53*  --  64*   ALT U/L  --   --   --   --   --   --  20   < > 25   ALK PHOS U/L  --   --   --   --   --   --  54 3   < > 70 3    < > = values in this interval not displayed  Results from last 7 days   Lab Units 02/17/22  0352 02/16/22  1821 02/16/22  1410   BLOOD CULTURE   --   --  No Growth After 4 Days  No Growth After 4 Days  URINE CULTURE  20,000-29,000 cfu/ml   --   --    MRSA CULTURE ONLY   --  Methicillin Resistant Staphylococcus aureus isolated*  This patient requires contact isolation precautions per [place] law  Contact precautions are not required in [place] for nasal surveillance cultures    --      Results from last 7 days   Lab Units 02/18/22  0612 02/17/22  0444   PROCALCITONIN ng/ml 6 37* 10 37*     Results from last 7 days   Lab Units 02/19/22  0554 02/17/22  0444   CRP mg/dL 11 4* 33 2*         Results from last 7 days   Lab Units 02/19/22  0554 02/17/22  0444 02/16/22  1410   D-DIMER QUANTITATIVE ug/ml FEU 1 84* 3 99* 5 57*

## 2022-02-21 NOTE — ASSESSMENT & PLAN NOTE
· RESOLVED  · POA as evidenced by SIRS: Tachycardia ('s), Hypotension (SBP<95), Leukocytosis (WBC 23)  · Tachycardia resolved  · WBCs trending down  Most recent: 8 7  · End organ Dysfunction: Acute kidney Injury (Resolved)  · Infection: Suspect worsening B/L LE cellulitis vs UTI vs Pneumonia  · Legs appear stable per patient  · CXR: negative  · UA (+) nitrites, RBCs, WBCs, bacteria  There is presence of epithelial cells as well  · Urine culture: Mixed Contaminants   · Fluids: S/P 1L NSS Bolus in the ED  LR Fluids Discontinued  · Will hold off from sepsis protocol Fluid resuscitation due to severe aortic stenosis and heart failure  · Monitor closely for signs of Volume overload   · ABX: S/P Clindamycin in the ED  Transitioned to IV Cefepime  Changed to Ancef by ID  · Lactic: Negative  · Procalcitonin elevated @ 10 37-->6 37  · CRP elevated @ 33 2-->11 4  · CK elevated @ 2600 3-->490  · Cultures:  · BC x2: Pending  · U/A and U/C: Mixed Contaminants  · Infectious Disease following, appreciate recommendations:  · Cellulitis suspect streptococcal infection  Continue Ancef 1 G IV q 8 hours    Upon discharge, transition to cephalexin 500 mg p o  Q 6 hours to complete 7 day course through February 22nd

## 2022-02-21 NOTE — PROGRESS NOTES
[facility] 43  Progress Note - Andrew Thrasher 1950, 66 y o  female MRN: [id number]  Unit/Bed#: -01 Encounter: [id number]  Primary Care Provider: Tejal Nunez MD   Date and time admitted to hospital: 2/16/2022 12:33 PM    * Severe sepsis ([address] )  Assessment & Plan  · RESOLVED  · POA as evidenced by SIRS: Tachycardia ('s), Hypotension (SBP<95), Leukocytosis (WBC 23)  · Tachycardia resolved  · WBCs trending down  Most recent: 8 7  · End organ Dysfunction: Acute kidney Injury (Resolved)  · Infection: Suspect worsening B/L LE cellulitis vs UTI vs Pneumonia  · Legs appear stable per patient  · CXR: negative  · UA (+) nitrites, RBCs, WBCs, bacteria  There is presence of epithelial cells as well  · Urine culture: Mixed Contaminants   · Fluids: S/P 1L NSS Bolus in the ED  LR Fluids Discontinued  · Will hold off from sepsis protocol Fluid resuscitation due to severe aortic stenosis and heart failure  · Monitor closely for signs of Volume overload   · ABX: S/P Clindamycin in the ED  Transitioned to IV Cefepime  Changed to Ancef by ID  · Lactic: Negative  · Procalcitonin elevated @ 10 37-->6 37  · CRP elevated @ 33 2-->11 4  · CK elevated @ 2600 3-->490  · Cultures:  · BC x2: Pending  · U/A and U/C: Mixed Contaminants  · Infectious Disease following, appreciate recommendations:  · Cellulitis suspect streptococcal infection  Continue Ancef 1 G IV q 8 hours  Upon discharge, transition to cephalexin 500 mg p o  Q 6 hours to complete 7 day course through February 22nd    Ambulatory dysfunction  Assessment & Plan  · Pre-Syncope  · Patient with History of recurrent falls over the last 2 days  · CTH:  No acute intracranial abnormality  Small focus of encephalomalacia within the left occipital lobe which may represent an old infarct    · Xray RLE:  No acute osseous abnormality  · Xray LLE:  No acute osseous abnormality  · Continue fall precautions   · Perform Orthostatic Blood pressures, starting tomorrow 2/17  · PT/OT evaluation: Short Term Rehab  · Case Management Consulted  · Patient medically clear for discharge  · Patient requesting rehab at [facility]  Currently no beds available  Awaiting further rehab plans  Elevated troponin  Assessment & Plan  · Suspect in the setting of acute infection   · ACS R/O'd  · HS Trop: 184--> 159 --> 179  · Patient denies any acute complaints of chest pain  · No findings on telemetry  Discontinued  · ECHO: EF 60% with grade 1 diastolic dysfunction  Mild-to-moderate aortic regurgitation with critical stenosis  · Monitor volume status closely  · Recommend outpatient follow-up with Cardiology    Acute kidney injury [facility])  Assessment & Plan  · POA  Resolved   · Crt on admisison: 1 44  · Baseline Crt  Appears to be around 0 7-0 8 however in January 2021, creatinine was 1 22     · Most recent Creatinine: 0 91  · Likely secondary to sepsis and poor oral intake  · S/P IV hydration  · Avoid/Limit Nephrotoxins  · Avoid hypotension and fluctuation and BP  · Continue monitor renal function    Recurrent cellulitis of lower extremity  Assessment & Plan  · Patient with history of recurrent cellulitis  · Patient states she has wound nurses following at home with frequent dressing changes and reports her legs have been "stable"  · Wound Care Consulted and appreciate their recommendations  · Continue supportive care    Elevated d-dimer  Assessment & Plan  · POA  · D-Dimer: 5 57--> most recent 3 99  · Vasc Duplex:  No evidence of acute or chronic DVTs bilaterally  No evidence of superficial thrombophlebitis noted bilaterally  · No acute complaints of shortness of breath, tachycardia or hypoxia      · Hold off on CTA    History of Clostridium difficile colitis  Assessment & Plan  · History of C-Diff infection  · Continue prophylactic PO Vancomycin in the setting of acute infection and antibiotic use  · Infectious disease following  · Continue vanco dosing through February 25th    Severe aortic stenosis  Assessment & Plan  · History of severe aortic stenosis  · Previous Echo (5/2021): Ef 55% with Grade 1 diastolic dysfunction and Severe Aortic stenosis  · Repeat ECHO demonstrates EF 60% with critical AS  · Monitor I/O's and volume status  · Daily weights  · Ambulatory referral for cardiac surgery placed    Idiopathic hypotension  Assessment & Plan  · BP reviewed and is stable  · Continue home medication:  · Midodrine 10 mg t i d   · Hold parameters for SBP >/= 130    Depression with anxiety  Assessment & Plan  · Continue Home Medications:  · Wellbutrin, BuSpar, and Lexapro  · EK with QT interval: 409      VTE Pharmacologic Prophylaxis: VTE Score: 5 High Risk (Score >/= 5) - Pharmacological DVT Prophylaxis Ordered: heparin  Sequential Compression Devices Ordered  Patient Centered Rounds: I performed bedside rounds with nursing staff today  Discussions with Specialists or Other Care Team Provider:  Case Management, Physical therapy, Occupational therapy    Education and Discussions with Family / Patient: Updated  (sister) at bedside  Time Spent for Care: 30 minutes  More than 50% of total time spent on counseling and coordination of care as described above  Current Length of Stay: 5 day(s)  Current Patient Status: Inpatient   Certification Statement: The patient will continue to require additional inpatient hospital stay due to Placement to short-term rehab  Discharge Plan: Anticipate discharge in 24-48 hrs to rehab facility  Code Status: Level 1 - Full Code    Subjective:   Patient states she feels all right  Patient complaining of continued diarrhea  Patient stated her stools are little dark today  Discussed with patient possibility of dark stools related to iron supplementation but will continue to monitor  Patient denies any active chest pain, worsening shortness of breath, abdominal pain, nausea, or vomiting      Objective: Vitals:   Temp (24hrs), Av 1 °F (36 2 °C), Min:96 9 °F (36 1 °C), Max:97 2 °F (36 2 °C)    Temp:  [96 9 °F (36 1 °C)-97 2 °F (36 2 °C)] 96 9 °F (36 1 °C)  HR:  [69-72] 72  Resp:  [16] 16  BP: (117-118)/(61-71) 118/61  SpO2:  [100 %] 100 %  Body mass index is 31 75 kg/m²  Input and Output Summary (last 24 hours):   No intake or output data in the 24 hours ending 22 1310    Physical Exam:   Physical Exam  Vitals and nursing note reviewed  Constitutional:       General: She is not in acute distress  Appearance: She is obese  She is ill-appearing  HENT:      Head: Normocephalic  Nose: Nose normal  No congestion  Mouth/Throat:      Mouth: Mucous membranes are moist       Pharynx: Oropharynx is clear  Cardiovascular:      Rate and Rhythm: Normal rate and regular rhythm  Pulses: Normal pulses  Heart sounds: Murmur heard  Pulmonary:      Effort: Pulmonary effort is normal  No respiratory distress  Breath sounds: Decreased breath sounds present  Abdominal:      General: Bowel sounds are normal  There is no distension  Palpations: Abdomen is soft  Tenderness: There is no abdominal tenderness  Musculoskeletal:         General: Tenderness (B/L LE) present  Normal range of motion  Cervical back: Normal range of motion  Right lower leg: Edema present  Left lower leg: Edema present  Skin:     General: Skin is warm and dry  Capillary Refill: Capillary refill takes less than 2 seconds  Findings: Erythema (B/L LE) present  Comments: Blister, Right Groin   Neurological:      Mental Status: She is alert and oriented to person, place, and time  Mental status is at baseline  Motor: Weakness (B/L LE) present  Psychiatric:         Attention and Perception: Attention normal          Mood and Affect: Mood normal          Speech: Speech normal          Behavior: Behavior is cooperative           Judgment: Judgment normal  Additional Data:     Labs:  Results from last 7 days   Lab Units 02/20/22  0621   WBC Thousand/uL 8 70   HEMOGLOBIN g/dL 8 1*   HEMATOCRIT % 26 8*   PLATELETS Thousands/uL 213   NEUTROS PCT % 68   LYMPHS PCT % 20   MONOS PCT % 6   EOS PCT % 4     Results from last 7 days   Lab Units 02/20/22  0621 02/18/22  0612 02/17/22  0444   SODIUM mmol/L 140   < > 135   POTASSIUM mmol/L 4 0   < > 3 7   CHLORIDE mmol/L 107   < > 102   CO2 mmol/L 27   < > 22   BUN mg/dL 16   < > 31*   CREATININE mg/dL 0 91   < > 1 22*   ANION GAP mmol/L 6   < > 11   CALCIUM mg/dL 8 7   < > 8 2*   ALBUMIN g/dL  --   --  2 6*   TOTAL BILIRUBIN mg/dL  --   --  0 36   ALK PHOS U/L  --   --  54 3   ALT U/L  --   --  20   AST U/L  --   --  53*   GLUCOSE RANDOM mg/dL 76   < > 65    < > = values in this interval not displayed  Results from last 7 days   Lab Units 02/18/22  0612 02/17/22  0444 02/16/22  1620   LACTIC ACID mmol/L  --   --  0 8   PROCALCITONIN ng/ml 6 37* 10 37*  --        Lines/Drains:  Invasive Devices  Report    Peripheral Intravenous Line            Peripheral IV 02/18/22 Left;Ventral (anterior) Hand 2 days    Peripheral IV 02/20/22 Left;Proximal;Ventral (anterior) Forearm 1 day                      Imaging: No pertinent imaging reviewed  Recent Cultures (last 7 days):   Results from last 7 days   Lab Units 02/17/22  0352 02/16/22  1410   BLOOD CULTURE   --  No Growth After 4 Days  No Growth After 4 Days     URINE CULTURE  20,000-29,000 cfu/ml   --        Last 24 Hours Medication List:   Current Facility-Administered Medications   Medication Dose Route Frequency Provider Last Rate    acetaminophen  650 mg Oral Q6H PRN ROSCOE Le      buPROPion  300 mg Oral Daily Rutchase Bazzi, ROSCOE      busPIRone  15 mg Oral BID RutROSCOE Bowie      cefazolin  1,000 mg Intravenous Q8H Joe Borges, DO 1,000 mg (02/21/22 0855)    cholecalciferol  1,000 Units Oral Daily ROSCOE Le      escitalopram  10 mg Oral Daily ROSCOE García      ferrous sulfate  650 mg Oral Daily With Breakfast ROSCOE García      heparin (porcine)  5,000 Units Subcutaneous [facility] ROSCOE García      midodrine  10 mg Oral TID AC ROSCOE García      multivitamin stress formula  1 tablet Oral Daily ROSCOE García      ondansetron  4 mg Intravenous Q6H PRN ROSCOE García      saccharomyces boulardii  250 mg Oral Daily ROSCOE García      vancomycin  125 mg Oral Q12H [address] Nadira Torres PA-C          Today, Patient Was Seen By: ROSCOE García    **Please Note: This note may have been constructed using a voice recognition system  **

## 2022-02-21 NOTE — ASSESSMENT & PLAN NOTE
· POA  Resolved   · Crt on admisison: 1 44  · Baseline Crt   Appears to be around 0 7-0 8 however in January 2021, creatinine was 1 22     · Most recent Creatinine: 0 79  · Likely secondary to sepsis and poor oral intake  · S/P IV hydration  · Avoid/Limit Nephrotoxins  · Avoid hypotension and fluctuation and BP  · Continue monitor renal function

## 2022-02-21 NOTE — PLAN OF CARE
Problem: PAIN - ADULT  Goal: Verbalizes/displays adequate comfort level or baseline comfort level  Description: Interventions:  - Encourage patient to monitor pain and request assistance  - Assess pain using appropriate pain scale  - Administer analgesics based on type and severity of pain and evaluate response  - Implement non-pharmacological measures as appropriate and evaluate response  - Consider cultural and social influences on pain and pain management  - Notify physician/advanced practitioner if interventions unsuccessful or patient reports new pain  Outcome: Progressing     Problem: INFECTION - ADULT  Goal: Absence or prevention of progression during hospitalization  Description: INTERVENTIONS:  - Assess and monitor for signs and symptoms of infection  - Monitor lab/diagnostic results  - Administer medications as ordered  - Instruct and encourage patient and family to use good hand hygiene technique  - Identify and instruct in appropriate isolation precautions for identified infection/condition  Outcome: Progressing  Goal: Absence of fever/infection during neutropenic period  Description: INTERVENTIONS:  - Monitor WBC    Outcome: Progressing     Problem: SAFETY ADULT  Goal: Patient will remain free of falls  Description: INTERVENTIONS:  - Educate patient/family on patient safety including physical limitations  - Instruct patient to call for assistance with activity   - Consult OT/PT to assist with strengthening/mobility   - Keep Call bell within reach  - Keep bed low and locked with side rails adjusted as appropriate  - Keep care items and personal belongings within reach  - Initiate and maintain comfort rounds  - Make Fall Risk Sign visible to staff  - Offer Toileting every 2  Hours, in advance of need  - Initiate/Maintain  bed and chair alarm  - Apply yellow socks and bracelet for high fall risk patients  - Consider moving patient to room near nurses station  Outcome: Progressing  Goal: Maintain or return to baseline ADL function  Description: INTERVENTIONS:  -  Assess patient's ability to carry out ADLs; assess patient's baseline for ADL function and identify physical deficits which impact ability to perform ADLs (bathing, care of mouth/teeth, toileting, grooming, dressing, etc )  - Assess/evaluate cause of self-care deficits   - Assess range of motion  - Assess patient's mobility; develop plan if impaired  - Assess patient's need for assistive devices and provide as appropriate  - Encourage maximum independence but intervene and supervise when necessary  - Involve family in performance of ADLs  - Assess for home care needs following discharge   - Consider OT consult to assist with ADL evaluation and planning for discharge  - Provide patient education as appropriate  Outcome: Progressing  Goal: Maintains/Returns to pre admission functional level  Description: INTERVENTIONS:  - Perform BMAT or MOVE assessment daily    - Set and communicate daily mobility goal to care team and patient/family/caregiver  - Collaborate with rehabilitation services on mobility goals if consulted  - Perform Range of Motion 3 times a day  - Reposition patient every  2  hours    - Dangle patient  3  times a day  - Stand patient  3 times a day  - Ambulate patient  3  times a day  - Out of bed to chair 3  times a day   - Out of bed for meals 3  times a day  - Out of bed for toileting  - Record patient progress and toleration of activity level   Outcome: Progressing     Problem: DISCHARGE PLANNING  Goal: Discharge to home or other facility with appropriate resources  Description: INTERVENTIONS:  - Identify barriers to discharge w/patient and caregiver  - Arrange for needed discharge resources and transportation as appropriate  - Identify discharge learning needs (meds, wound care, etc )  - Arrange for interpretive services to assist at discharge as needed  - Refer to Case Management Department for coordinating discharge planning if the patient needs post-hospital services based on physician/advanced practitioner order or complex needs related to functional status, cognitive ability, or social support system  Outcome: Progressing     Problem: Knowledge Deficit  Goal: Patient/family/caregiver demonstrates understanding of disease process, treatment plan, medications, and discharge instructions  Description: Complete learning assessment and assess knowledge base  Interventions:  - Provide teaching at level of understanding  - Provide teaching via preferred learning methods  Outcome: Progressing     Problem: MOBILITY - ADULT  Goal: Maintain or return to baseline ADL function  Description: INTERVENTIONS:  -  Assess patient's ability to carry out ADLs; assess patient's baseline for ADL function and identify physical deficits which impact ability to perform ADLs (bathing, care of mouth/teeth, toileting, grooming, dressing, etc )  - Assess/evaluate cause of self-care deficits   - Assess range of motion  - Assess patient's mobility; develop plan if impaired  - Assess patient's need for assistive devices and provide as appropriate  - Encourage maximum independence but intervene and supervise when necessary  - Involve family in performance of ADLs  - Assess for home care needs following discharge   - Consider OT consult to assist with ADL evaluation and planning for discharge  - Provide patient education as appropriate  Outcome: Progressing  Goal: Maintains/Returns to pre admission functional level  Description: INTERVENTIONS:  - Perform BMAT or MOVE assessment daily    - Set and communicate daily mobility goal to care team and patient/family/caregiver  - Collaborate with rehabilitation services on mobility goals if consulted  - Perform Range of Motion 3  times a day  - Reposition patient every  2  hours    - Dangle patient  3  times a day  - Stand patient  3 times a day  - Ambulate patient  3 times a day  - Out of bed to chair  3 times a day   - Out of bed for meals 3 times a day  - Out of bed for toileting  - Record patient progress and toleration of activity level   Outcome: Progressing     Problem: Prexisting or High Potential for Compromised Skin Integrity  Goal: Skin integrity is maintained or improved  Description: INTERVENTIONS:  - Identify patients at risk for skin breakdown  - Assess and monitor skin integrity  - Assess and monitor nutrition and hydration status  - Monitor labs   - Assess for incontinence   - Turn and reposition patient  - Assist with mobility/ambulation  - Relieve pressure over bony prominences  - Avoid friction and shearing  - Provide appropriate hygiene as needed including keeping skin clean and dry  - Evaluate need for skin moisturizer/barrier cream  - Collaborate with interdisciplinary team   - Patient/family teaching  - Consider wound care consult   Outcome: Progressing     Problem: Potential for Falls  Goal: Patient will remain free of falls  Description: INTERVENTIONS:  - Educate patient/family on patient safety including physical limitations  - Instruct patient to call for assistance with activity   - Consult OT/PT to assist with strengthening/mobility   - Keep Call bell within reach  - Keep bed low and locked with side rails adjusted as appropriate  - Keep care items and personal belongings within reach  - Initiate and maintain comfort rounds  - Make Fall Risk Sign visible to staff  - Offer Toileting every 2  Hours, in advance of need  - Initiate/Maintain bed and chair alarm  - Apply yellow socks and bracelet for high fall risk patients  - Consider moving patient to room near nurses station  Outcome: Progressing     Problem: Nutrition/Hydration-ADULT  Goal: Nutrient/Hydration intake appropriate for improving, restoring or maintaining nutritional needs  Description: Monitor and assess patient's nutrition/hydration status for malnutrition  Collaborate with interdisciplinary team and initiate plan and interventions as ordered  Monitor patient's weight and dietary intake as ordered or per policy  Utilize nutrition screening tool and intervene as necessary  Determine patient's food preferences and provide high-protein, high-caloric foods as appropriate       INTERVENTIONS:  - Monitor oral intake, urinary output, labs, and treatment plans  - Assess nutrition and hydration status and recommend course of action  - Evaluate amount of meals eaten  - Assist patient with eating if necessary   - Allow adequate time for meals  - Recommend/ encourage appropriate diets, oral nutritional supplements, and vitamin/mineral supplements  - Order, calculate, and assess calorie counts as needed  - Assess need for intravenous fluids  - Provide specific nutrition/hydration education as appropriate  - Include patient/family/caregiver in decisions related to nutrition  Outcome: Progressing

## 2022-02-21 NOTE — ASSESSMENT & PLAN NOTE
· Pre-Syncope  · Patient with History of recurrent falls over the last 2 days  · CTH:  No acute intracranial abnormality  Small focus of encephalomalacia within the left occipital lobe which may represent an old infarct  · Xray RLE:  No acute osseous abnormality  · Xray LLE:  No acute osseous abnormality  · Continue fall precautions   · Perform Orthostatic Blood pressures, starting tomorrow 2/17  · PT/OT evaluation: Short Term Rehab  · Case Management Consulted  · Patient medically clear for discharge  · Patient requesting rehab at [facility]  Currently no beds available  Awaiting further rehab plans

## 2022-02-21 NOTE — ASSESSMENT & PLAN NOTE
· POA  Resolved   · Crt on admisison: 1 44  · Baseline Crt   Appears to be around 0 7-0 8 however in January 2021, creatinine was 1 22     · Most recent Creatinine: 0 91  · Likely secondary to sepsis and poor oral intake  · S/P IV hydration  · Avoid/Limit Nephrotoxins  · Avoid hypotension and fluctuation and BP  · Continue monitor renal function

## 2022-02-21 NOTE — ASSESSMENT & PLAN NOTE
· POA  · D-Dimer: 5 57--> 3 39 --> 1 23, trended down  · Vasc Duplex:  No evidence of acute or chronic DVTs bilaterally  No evidence of superficial thrombophlebitis noted bilaterally  · No acute complaints of shortness of breath, tachycardia or hypoxia      · Hold off on CTA

## 2022-02-22 VITALS
HEART RATE: 72 BPM | RESPIRATION RATE: 18 BRPM | SYSTOLIC BLOOD PRESSURE: 120 MMHG | HEIGHT: 58 IN | BODY MASS INDEX: 32.07 KG/M2 | OXYGEN SATURATION: 97 % | DIASTOLIC BLOOD PRESSURE: 61 MMHG | TEMPERATURE: 96.8 F | WEIGHT: 152.78 LBS

## 2022-02-22 LAB
ANION GAP SERPL CALCULATED.3IONS-SCNC: 7 MMOL/L (ref 4–13)
BASOPHILS # BLD AUTO: 0.04 THOUSANDS/ΜL (ref 0–0.1)
BASOPHILS NFR BLD AUTO: 1 % (ref 0–1)
BUN SERPL-MCNC: 13 MG/DL (ref 6–20)
CALCIUM SERPL-MCNC: 8.6 MG/DL (ref 8.4–10.2)
CHLORIDE SERPL-SCNC: 107 MMOL/L (ref 96–108)
CO2 SERPL-SCNC: 25 MMOL/L (ref 22–33)
CREAT SERPL-MCNC: 0.79 MG/DL (ref 0.4–1.1)
D DIMER PPP FEU-MCNC: 1.23 UG/ML FEU
EOSINOPHIL # BLD AUTO: 0.4 THOUSAND/ΜL (ref 0–0.61)
EOSINOPHIL NFR BLD AUTO: 6 % (ref 0–6)
ERYTHROCYTE [DISTWIDTH] IN BLOOD BY AUTOMATED COUNT: 15.6 % (ref 11.6–15.1)
GFR SERPL CREATININE-BSD FRML MDRD: 74 ML/MIN/1.73SQ M
GLUCOSE SERPL-MCNC: 73 MG/DL (ref 65–140)
HCT VFR BLD AUTO: 26.7 % (ref 34.8–46.1)
HGB BLD-MCNC: 8.1 G/DL (ref 11.5–15.4)
IMM GRANULOCYTES # BLD AUTO: 0.15 THOUSAND/UL (ref 0–0.2)
IMM GRANULOCYTES NFR BLD AUTO: 2 % (ref 0–2)
LYMPHOCYTES # BLD AUTO: 1.38 THOUSANDS/ΜL (ref 0.6–4.47)
LYMPHOCYTES NFR BLD AUTO: 20 % (ref 14–44)
MAGNESIUM SERPL-MCNC: 1.8 MG/DL (ref 1.6–2.6)
MCH RBC QN AUTO: 28.2 PG (ref 26.8–34.3)
MCHC RBC AUTO-ENTMCNC: 30.3 G/DL (ref 31.4–37.4)
MCV RBC AUTO: 93 FL (ref 82–98)
MONOCYTES # BLD AUTO: 0.5 THOUSAND/ΜL (ref 0.17–1.22)
MONOCYTES NFR BLD AUTO: 7 % (ref 4–12)
NEUTROPHILS # BLD AUTO: 4.47 THOUSANDS/ΜL (ref 1.85–7.62)
NEUTS SEG NFR BLD AUTO: 64 % (ref 43–75)
NRBC BLD AUTO-RTO: 0 /100 WBCS
PLATELET # BLD AUTO: 244 THOUSANDS/UL (ref 149–390)
PMV BLD AUTO: 10.9 FL (ref 8.9–12.7)
POTASSIUM SERPL-SCNC: 3.9 MMOL/L (ref 3.5–5)
PROCALCITONIN SERPL-MCNC: 0.58 NG/ML
RBC # BLD AUTO: 2.87 MILLION/UL (ref 3.81–5.12)
SODIUM SERPL-SCNC: 139 MMOL/L (ref 133–145)
WBC # BLD AUTO: 6.94 THOUSAND/UL (ref 4.31–10.16)

## 2022-02-22 PROCEDURE — 99239 HOSP IP/OBS DSCHRG MGMT >30: CPT | Performed by: PHYSICIAN ASSISTANT

## 2022-02-22 PROCEDURE — 80048 BASIC METABOLIC PNL TOTAL CA: CPT

## 2022-02-22 PROCEDURE — 85025 COMPLETE CBC W/AUTO DIFF WBC: CPT

## 2022-02-22 PROCEDURE — 84145 PROCALCITONIN (PCT): CPT

## 2022-02-22 PROCEDURE — 85379 FIBRIN DEGRADATION QUANT: CPT

## 2022-02-22 PROCEDURE — 83735 ASSAY OF MAGNESIUM: CPT

## 2022-02-22 RX ORDER — CEPHALEXIN 500 MG/1
500 CAPSULE ORAL EVERY 6 HOURS SCHEDULED
Qty: 3 CAPSULE | Refills: 0
Start: 2022-02-22 | End: 2022-02-23

## 2022-02-22 RX ORDER — NYSTATIN 100000 [USP'U]/G
POWDER TOPICAL 2 TIMES DAILY
Qty: 15 G | Refills: 0
Start: 2022-02-22 | End: 2022-02-25

## 2022-02-22 RX ADMIN — BUPROPION 300 MG: 150 TABLET, EXTENDED RELEASE ORAL at 09:14

## 2022-02-22 RX ADMIN — Medication 1000 UNITS: at 09:14

## 2022-02-22 RX ADMIN — FERROUS SULFATE TAB 325 MG (65 MG ELEMENTAL FE) 650 MG: 325 (65 FE) TAB at 09:14

## 2022-02-22 RX ADMIN — HEPARIN SODIUM 5000 UNITS: 5000 INJECTION INTRAVENOUS; SUBCUTANEOUS at 05:17

## 2022-02-22 RX ADMIN — B-COMPLEX W/ C & FOLIC ACID TAB 1 TABLET: TAB at 09:14

## 2022-02-22 RX ADMIN — ESCITALOPRAM OXALATE 10 MG: 20 TABLET ORAL at 09:14

## 2022-02-22 RX ADMIN — Medication 250 MG: at 09:15

## 2022-02-22 RX ADMIN — MIDODRINE HYDROCHLORIDE 10 MG: 5 TABLET ORAL at 09:14

## 2022-02-22 RX ADMIN — BUSPIRONE HYDROCHLORIDE 15 MG: 5 TABLET ORAL at 09:14

## 2022-02-22 RX ADMIN — CEFAZOLIN SODIUM 1000 MG: 1 SOLUTION INTRAVENOUS at 02:12

## 2022-02-22 NOTE — PLAN OF CARE
Problem: PAIN - ADULT  Goal: Verbalizes/displays adequate comfort level or baseline comfort level  Description: Interventions:  - Encourage patient to monitor pain and request assistance  - Assess pain using appropriate pain scale  - Administer analgesics based on type and severity of pain and evaluate response  - Implement non-pharmacological measures as appropriate and evaluate response  - Consider cultural and social influences on pain and pain management  - Notify physician/advanced practitioner if interventions unsuccessful or patient reports new pain  2/22/2022 1059 by Agnes Louise RN  Outcome: Adequate for Discharge  2/22/2022 0817 by Agnes Louise RN  Outcome: Progressing     Problem: INFECTION - ADULT  Goal: Absence or prevention of progression during hospitalization  Description: INTERVENTIONS:  - Assess and monitor for signs and symptoms of infection  - Monitor lab/diagnostic results  - Monitor all insertion sites, i e  indwelling lines, tubes, and drains  - Monitor endotracheal if appropriate and nasal secretions for changes in amount and color  - [place] appropriate cooling/warming therapies per order  - Administer medications as ordered  - Instruct and encourage patient and family to use good hand hygiene technique  - Identify and instruct in appropriate isolation precautions for identified infection/condition  Outcome: Adequate for Discharge  Goal: Absence of fever/infection during neutropenic period  Description: INTERVENTIONS:  - Monitor WBC    Outcome: Adequate for Discharge     Problem: SAFETY ADULT  Goal: Patient will remain free of falls  Description: INTERVENTIONS:  - Educate patient/family on patient safety including physical limitations  - Instruct patient to call for assistance with activity   - Consult OT/PT to assist with strengthening/mobility   - Keep Call bell within reach  - Keep bed low and locked with side rails adjusted as appropriate  - Keep care items and personal belongings within reach  - Initiate and maintain comfort rounds  - Make Fall Risk Sign visible to staff  - Apply yellow socks and bracelet for high fall risk patients  - Consider moving patient to room near nurses station  Outcome: Adequate for Discharge  Goal: Maintain or return to baseline ADL function  Description: INTERVENTIONS:  -  Assess patient's ability to carry out ADLs; assess patient's baseline for ADL function and identify physical deficits which impact ability to perform ADLs (bathing, care of mouth/teeth, toileting, grooming, dressing, etc )  - Assess/evaluate cause of self-care deficits   - Assess range of motion  - Assess patient's mobility; develop plan if impaired  - Assess patient's need for assistive devices and provide as appropriate  - Encourage maximum independence but intervene and supervise when necessary  - Involve family in performance of ADLs  - Assess for home care needs following discharge   - Consider OT consult to assist with ADL evaluation and planning for discharge  - Provide patient education as appropriate  Outcome: Adequate for Discharge  Goal: Maintains/Returns to pre admission functional level  Description: INTERVENTIONS:  - Perform BMAT or MOVE assessment daily    - Set and communicate daily mobility goal to care team and patient/family/caregiver     - Collaborate with rehabilitation services on mobility goals if consulted  - Out of bed for toileting  - Record patient progress and toleration of activity level   Outcome: Adequate for Discharge     Problem: DISCHARGE PLANNING  Goal: Discharge to home or other facility with appropriate resources  Description: INTERVENTIONS:  - Identify barriers to discharge w/patient and caregiver  - Arrange for needed discharge resources and transportation as appropriate  - Identify discharge learning needs (meds, wound care, etc )  - Arrange for interpretive services to assist at discharge as needed  - Refer to Case Management Department for coordinating discharge planning if the patient needs post-hospital services based on physician/advanced practitioner order or complex needs related to functional status, cognitive ability, or social support system  Outcome: Adequate for Discharge     Problem: Knowledge Deficit  Goal: Patient/family/caregiver demonstrates understanding of disease process, treatment plan, medications, and discharge instructions  Description: Complete learning assessment and assess knowledge base  Interventions:  - Provide teaching at level of understanding  - Provide teaching via preferred learning methods  Outcome: Adequate for Discharge     Problem: MOBILITY - ADULT  Goal: Maintain or return to baseline ADL function  Description: INTERVENTIONS:  -  Assess patient's ability to carry out ADLs; assess patient's baseline for ADL function and identify physical deficits which impact ability to perform ADLs (bathing, care of mouth/teeth, toileting, grooming, dressing, etc )  - Assess/evaluate cause of self-care deficits   - Assess range of motion  - Assess patient's mobility; develop plan if impaired  - Assess patient's need for assistive devices and provide as appropriate  - Encourage maximum independence but intervene and supervise when necessary  - Involve family in performance of ADLs  - Assess for home care needs following discharge   - Consider OT consult to assist with ADL evaluation and planning for discharge  - Provide patient education as appropriate  Outcome: Adequate for Discharge  Goal: Maintains/Returns to pre admission functional level  Description: INTERVENTIONS:  - Perform BMAT or MOVE assessment daily    - Set and communicate daily mobility goal to care team and patient/family/caregiver     - Collaborate with rehabilitation services on mobility goals if consulted  - Out of bed for toileting  - Record patient progress and toleration of activity level   Outcome: Adequate for Discharge     Problem: Prexisting or High Potential for Compromised Skin Integrity  Goal: Skin integrity is maintained or improved  Description: INTERVENTIONS:  - Identify patients at risk for skin breakdown  - Assess and monitor skin integrity  - Assess and monitor nutrition and hydration status  - Monitor labs   - Assess for incontinence   - Turn and reposition patient  - Assist with mobility/ambulation  - Relieve pressure over bony prominences  - Avoid friction and shearing  - Provide appropriate hygiene as needed including keeping skin clean and dry  - Evaluate need for skin moisturizer/barrier cream  - Collaborate with interdisciplinary team   - Patient/family teaching  - Consider wound care consult   Outcome: Adequate for Discharge     Problem: Potential for Falls  Goal: Patient will remain free of falls  Description: INTERVENTIONS:  - Educate patient/family on patient safety including physical limitations  - Instruct patient to call for assistance with activity   - Consult OT/PT to assist with strengthening/mobility   - Keep Call bell within reach  - Keep bed low and locked with side rails adjusted as appropriate  - Keep care items and personal belongings within reach  - Initiate and maintain comfort rounds  - Make Fall Risk Sign visible to staff  - Apply yellow socks and bracelet for high fall risk patients  - Consider moving patient to room near nurses station  Outcome: Adequate for Discharge     Problem: Nutrition/Hydration-ADULT  Goal: Nutrient/Hydration intake appropriate for improving, restoring or maintaining nutritional needs  Description: Monitor and assess patient's nutrition/hydration status for malnutrition  Collaborate with interdisciplinary team and initiate plan and interventions as ordered  Monitor patient's weight and dietary intake as ordered or per policy  Utilize nutrition screening tool and intervene as necessary  Determine patient's food preferences and provide high-protein, high-caloric foods as appropriate       INTERVENTIONS:  - Monitor oral intake, urinary output, labs, and treatment plans  - Assess nutrition and hydration status and recommend course of action  - Evaluate amount of meals eaten  - Assist patient with eating if necessary   - Allow adequate time for meals  - Recommend/ encourage appropriate diets, oral nutritional supplements, and vitamin/mineral supplements  - Order, calculate, and assess calorie counts as needed  - Recommend, monitor, and adjust tube feedings and TPN/PPN based on assessed needs  - Assess need for intravenous fluids  - Provide specific nutrition/hydration education as appropriate  - Include patient/family/caregiver in decisions related to nutrition  Outcome: Adequate for Discharge

## 2022-02-22 NOTE — PLAN OF CARE
Problem: PAIN - ADULT  Goal: Verbalizes/displays adequate comfort level or baseline comfort level  Description: Interventions:  - Encourage patient to monitor pain and request assistance  - Assess pain using appropriate pain scale  - Administer analgesics based on type and severity of pain and evaluate response  - Implement non-pharmacological measures as appropriate and evaluate response  - Consider cultural and social influences on pain and pain management  - Notify physician/advanced practitioner if interventions unsuccessful or patient reports new pain  Outcome: Progressing     Problem: INFECTION - ADULT  Goal: Absence of fever/infection during neutropenic period  Description: INTERVENTIONS:  - Monitor WBC    Outcome: Progressing     Problem: SAFETY ADULT  Goal: Patient will remain free of falls  Description: INTERVENTIONS:  - Educate patient/family on patient safety including physical limitations  - Instruct patient to call for assistance with activity   - Consult OT/PT to assist with strengthening/mobility   - Keep Call bell within reach  - Keep bed low and locked with side rails adjusted as appropriate  - Keep care items and personal belongings within reach  - Initiate and maintain comfort rounds  - Make Fall Risk Sign visible to staff  - Offer Toileting every 2  Hours, in advance of need/ purwick  - Initiate/Maintain bed alarm    - Apply yellow socks and bracelet for high fall risk patients  - Consider moving patient to room near nurses station  Outcome: Progressing  Goal: Maintain or return to baseline ADL function  Description: INTERVENTIONS:  -  Assess patient's ability to carry out ADLs; assess patient's baseline for ADL function and identify physical deficits which impact ability to perform ADLs (bathing, care of mouth/teeth, toileting, grooming, dressing, etc )  - Assess/evaluate cause of self-care deficits   - Assess range of motion  - Assess patient's mobility; develop plan if impaired  - Assess patient's need for assistive devices and provide as appropriate  - Encourage maximum independence but intervene and supervise when necessary  - Involve family in performance of ADLs  - Assess for home care needs following discharge   - Consider OT consult to assist with ADL evaluation and planning for discharge  - Provide patient education as appropriate  Outcome: Progressing  Goal: Maintains/Returns to pre admission functional level  Description: INTERVENTIONS:  - Perform BMAT or MOVE assessment daily    - Set and communicate daily mobility goal to care team and patient/family/caregiver  - Collaborate with rehabilitation services on mobility goals if consulted  - Out of bed for toileting  - Record patient progress and toleration of activity level   Outcome: Progressing     Problem: MOBILITY - ADULT  Goal: Maintain or return to baseline ADL function  Description: INTERVENTIONS:  -  Assess patient's ability to carry out ADLs; assess patient's baseline for ADL function and identify physical deficits which impact ability to perform ADLs (bathing, care of mouth/teeth, toileting, grooming, dressing, etc )  - Assess/evaluate cause of self-care deficits   - Assess range of motion  - Assess patient's mobility; develop plan if impaired  - Assess patient's need for assistive devices and provide as appropriate  - Encourage maximum independence but intervene and supervise when necessary  - Involve family in performance of ADLs  - Assess for home care needs following discharge   - Consider OT consult to assist with ADL evaluation and planning for discharge  - Provide patient education as appropriate  Outcome: Progressing  Goal: Maintains/Returns to pre admission functional level  Description: INTERVENTIONS:  - Perform BMAT or MOVE assessment daily    - Set and communicate daily mobility goal to care team and patient/family/caregiver     - Collaborate with rehabilitation services on mobility goals if consulted  - Out of bed for toileting  - Record patient progress and toleration of activity level   Outcome: Progressing     Problem: Prexisting or High Potential for Compromised Skin Integrity  Goal: Skin integrity is maintained or improved  Description: INTERVENTIONS:  - Identify patients at risk for skin breakdown  - Assess and monitor skin integrity  - Assess and monitor nutrition and hydration status  - Monitor labs   - Assess for incontinence   - Turn and reposition patient  - Assist with mobility/ambulation  - Relieve pressure over bony prominences  - Avoid friction and shearing  - Provide appropriate hygiene as needed including keeping skin clean and dry  - Evaluate need for skin moisturizer/barrier cream  - Collaborate with interdisciplinary team   - Patient/family teaching  - Consider wound care consult   Outcome: Progressing     Problem: Potential for Falls  Goal: Patient will remain free of falls  Description: INTERVENTIONS:  - Educate patient/family on patient safety including physical limitations  - Instruct patient to call for assistance with activity   - Consult OT/PT to assist with strengthening/mobility   - Keep Call bell within reach  - Keep bed low and locked with side rails adjusted as appropriate  - Keep care items and personal belongings within reach  - Initiate and maintain comfort rounds  - Apply yellow socks and bracelet for high fall risk patients  - Consider moving patient to room near nurses station  Outcome: Progressing

## 2022-02-23 ENCOUNTER — NURSING HOME VISIT (OUTPATIENT)
Dept: GERIATRICS | Facility: OTHER | Age: 72
End: 2022-02-23
Payer: MEDICARE

## 2022-02-23 VITALS
SYSTOLIC BLOOD PRESSURE: 139 MMHG | HEART RATE: 78 BPM | RESPIRATION RATE: 18 BRPM | OXYGEN SATURATION: 97 % | DIASTOLIC BLOOD PRESSURE: 82 MMHG | BODY MASS INDEX: 31.39 KG/M2 | WEIGHT: 150.2 LBS | TEMPERATURE: 97.9 F

## 2022-02-23 DIAGNOSIS — R26.2 AMBULATORY DYSFUNCTION: ICD-10-CM

## 2022-02-23 DIAGNOSIS — L03.119 RECURRENT CELLULITIS OF LOWER EXTREMITY: Primary | ICD-10-CM

## 2022-02-23 DIAGNOSIS — F41.8 DEPRESSION WITH ANXIETY: ICD-10-CM

## 2022-02-23 DIAGNOSIS — Z86.19 HISTORY OF CLOSTRIDIUM DIFFICILE COLITIS: ICD-10-CM

## 2022-02-23 DIAGNOSIS — R77.8 ELEVATED TROPONIN: ICD-10-CM

## 2022-02-23 DIAGNOSIS — I35.0 SEVERE AORTIC STENOSIS: ICD-10-CM

## 2022-02-23 PROCEDURE — 99306 1ST NF CARE HIGH MDM 50: CPT | Performed by: INTERNAL MEDICINE

## 2022-02-23 NOTE — PROGRESS NOTES
[facility]  [address] [facility] [address] [facility], [address] [place] [address]    Nursing Home Admission    NAME: Leonor Zelaya  AGE: 66 y o  SEX: female [id number]      Patient Location     [address] [place] [address] care was coordinated with nursing facility staff  Recent vitals, labs and updated medications were reviewed on [employer] [facility] of facility  Past Medical, surgical, social, medication and allergy history and patients previous records reviewed  Assessment/Plan:    Recurrent cellulitis of lower extremity  · Initially noted to be septic during recent admission; sepsis now resolved  · Suspected source of infection thought to be bilateral lower extremity cellulitis caused by streptococcal infection  · Id was brought on board ring previous admission; patient started on Ancef t i d  During inpatient course  · At discharge was transitioned to oral cephalexin 500 mg q 6 hours to complete a total of 7 days; last dose received on 05/20 2/22    · Continue supportive care  · Continue to monitor vitals, trend fevers  · Routine lab work as needed  · PT OT    Elevated troponin  · HS Trop: 184--> 159 --> 179  · No findings noted on telemetry during admission  · ACS ruled out during previous admission  · ECHO: EF 60% with grade 1 diastolic dysfunction  Mild-to-moderate aortic regurgitation with critical stenosis  · Monitor volume status closely    · Patient to follow-up with cardiology as outpatient      History of Clostridium difficile colitis  · Known history of C diff infection  · Patient recently on antibiotic course for bilateral lower extremity cellulitis  · Also started on prophylactic oral vancomycin for C diff colitis  · Last dose of vancomycin to be received on February 25    Ambulatory dysfunction  · With history of multiple falls  · Trauma workup negative during admission    · Continue fall precautions  · PT OT    Severe aortic stenosis  · Multiple echos demonstrating critical AS    · Patient to follow-up with Cardiology/Cardiac surgery outpatient    Depression with anxiety  · Continue home medications of bupropion, buspirone, escitalopram          Chief Complaint     Admission; bilateral lower extremity cellulitis; short-term rehab      HPI     Patient is a 66 y o  female; per chart review Cassia Ramos is a 66 y o  female patient who originally presented to the hospital on 2/16/2022 due to severe sepsis  Patient with PMH of anxiety, depression, idiopathic hypertension, severe aortic stenosis, and recurrent bilateral lower extremity cellulitis who presented to the ED with worsening lower extremity erythema and ambulatory dysfunction x 2 days, found to be tachycardic and hypotensive on arrival  Patient met severe sepsis protocol, likely secondary to cellulitis vs UTI, as evidenced by UA  Patient received 1 L NSS bolus but sepsis fluid resuscitation protocol held secondary to severe aortic stenosis  Patient with multiple IV antibiotic allergies, received clindamycin in the ED and placed on IV cefepime  Infectious Disease was consulted  Urine culture revealed mixed contaminants, antibiotics changed to IV Ancef with plans to transition to oral Keflex upon discharge        Of note patient has history of C difficile infection, patient was placed on prophylactic oral vancomycin with plans to continue dosing until 3 days following antibiotic completion through until 2/25       Also of note patient was found to have acute kidney injury, elevated troponins and elevated D-dimer  AMADOU resolved with light fluid hydration and removal of nephrotoxic agents  Elevated troponins likely reactionary to acute renal dysfunction and severe sepsis  Recommending outpatient follow-up with Cardiology  Elevated D-dimer, since trending down  No acute hypoxia complaints of lower extremity pain, vascular study negative      During visit today, patient was pleasant and cooperative    Was not in any acute distress  Did request that her lorazepam be restarted (takes for anxiety) as it was held during previous inpatient admission  Past Medical History:   Diagnosis Date    Anemia     Anxiety     Chronic back pain     Hyperkalemia     Hypertension     Hypotension     Lymphedema     BRANDYN (obstructive sleep apnea)     Psychiatric disorder     depression       Past Surgical History:   Procedure Laterality Date    APPENDECTOMY      GASTRIC BYPASS      obesity        Social History     Tobacco Use   Smoking Status Never Smoker   Smokeless Tobacco Never Used   Tobacco Comment    Never smoker - As per [place]           Family History   Family history unknown: Yes        Allergies   Allergen Reactions    Aspirin     Cephalosporins      Has tolerated cefepime    Penicillins Other (See Comments)     Unknown reaction during childhood      Valium [Diazepam]     Ciprofloxacin Rash    Sulfa Antibiotics Rash     Itching, rash    Vancomycin Rash          Current Outpatient Medications:     acetaminophen (TYLENOL) 325 mg tablet, Take 2 tablets (650 mg total) by mouth every 6 (six) hours as needed for mild pain or moderate pain (Patient not taking: Reported on 2/16/2022 ), Disp: 15 tablet, Rfl: 0    buPROPion (WELLBUTRIN XL) 300 mg 24 hr tablet, Take 1 tablet (300 mg total) by mouth daily, Disp: 30 tablet, Rfl: 0    busPIRone (BUSPAR) 15 mg tablet, Take 1 tablet (15 mg total) by mouth 2 (two) times a day, Disp: 60 tablet, Rfl: 0    cephalexin (KEFLEX) 500 mg capsule, Take 1 capsule (500 mg total) by mouth every 6 (six) hours for 1 day, Disp: 3 capsule, Rfl: 0    cholecalciferol (VITAMIN D3) 1,000 units tablet, Take 1,000 Units by mouth daily , Disp: , Rfl:     Cyanocobalamin (B-12 COMPLIANCE INJECTION IJ), Inject as directed, Disp: , Rfl:     cyclobenzaprine (FLEXERIL) 5 mg tablet, Take 1 tablet (5 mg total) by mouth 2 (two) times a day as needed for muscle spasms, Disp: 10 tablet, Rfl: 0    diphenhydrAMINE (BENADRYL) 25 mg tablet, Take 25 mg by mouth every 8 (eight) hours as needed (Patient not taking: Reported on 2/16/2022 ), Disp: , Rfl:     escitalopram (LEXAPRO) 10 mg tablet, Take 1 tablet (10 mg total) by mouth daily, Disp: 30 tablet, Rfl: 0    ferrous sulfate 325 (65 Fe) mg tablet, Take 2 tablets (650 mg total) by mouth daily with breakfast, Disp: 60 tablet, Rfl: 0    lidocaine (LIDODERM) 5 %, Apply 1 patch topically daily Remove & Discard patch within 12 hours or as directed by MD (Patient not taking: Reported on 9/22/2020), Disp: 5 patch, Rfl: 2    midodrine (PROAMATINE) 10 MG tablet, Take 1 tablet PO TID, hold for SBP >/= 130, Disp: 90 tablet, Rfl: 0    multivitamin (THERAGRAN) TABS, Take 1 tablet by mouth daily, Disp: , Rfl:     nystatin (MYCOSTATIN) powder, Apply topically 2 (two) times a day To areas of moisture along inguinal/groin region, Disp: 15 g, Rfl: 0    Probiotic Product (PROBIOTIC PO), Take 2 capsules by mouth daily, Disp: , Rfl:     vancomycin (VANCOCIN) 50mg/mL SOLN, Take 2 5 mL (125 mg total) by mouth every 12 (twelve) hours for 3 days, Disp: 15 mL, Rfl: 0  No current facility-administered medications for this visit  Updated list was reviewed in [address] [place] [address] system of facility  Vitals:    02/23/22 1206   BP: 139/82   Pulse: 78   Resp: 18   Temp: 97 9 °F (36 6 °C)   SpO2: 97%       Review of Systems   Constitutional: Negative for appetite change, fever and unexpected weight change  Eyes: Negative for pain and visual disturbance  Respiratory: Negative for cough, chest tightness and shortness of breath  Cardiovascular: Positive for leg swelling  Negative for chest pain and palpitations  Gastrointestinal: Positive for diarrhea (Attributes towards antibiotic)  Negative for abdominal pain, constipation, nausea and vomiting  Genitourinary: Negative for difficulty urinating and dysuria  Musculoskeletal: Negative for back pain, joint swelling and myalgias     Neurological: Negative for dizziness, numbness and headaches  Psychiatric/Behavioral: Negative for agitation, behavioral problems and confusion  Physical Exam  Constitutional:       General: She is not in acute distress  Appearance: She is obese  HENT:      Head: Normocephalic and atraumatic  Eyes:      General: No scleral icterus  Right eye: No discharge  Left eye: No discharge  Cardiovascular:      Rate and Rhythm: Normal rate and regular rhythm  Pulses: Normal pulses  Heart sounds: Murmur (Aortic stenosis) heard  Pulmonary:      Effort: Pulmonary effort is normal  No respiratory distress  Breath sounds: Normal breath sounds  No wheezing  Abdominal:      General: There is no distension  Palpations: Abdomen is soft  Tenderness: There is no abdominal tenderness  Musculoskeletal:         General: No swelling or tenderness  Normal range of motion  Cervical back: Normal range of motion  No muscular tenderness  Right lower leg: Edema present  Left lower leg: Edema present  Comments: Chronic skin changes in bilateral lower extremities:  dry, scaly skin  Ecchymosis on right upper extremity   Skin:     General: Skin is warm and dry  Neurological:      General: No focal deficit present  Mental Status: She is alert and oriented to person, place, and time  Mental status is at baseline  Psychiatric:         Mood and Affect: Mood normal          Behavior: Behavior normal            Diagnostic Data       Recent labs were reviewed        Additional notes:

## 2022-02-23 NOTE — ASSESSMENT & PLAN NOTE
· Initially noted to be septic during recent admission; sepsis now resolved  · Suspected source of infection thought to be bilateral lower extremity cellulitis caused by streptococcal infection  · Id was brought on board ring previous admission; patient started on Ancef t i d   During inpatient course  · At discharge was transitioned to oral cephalexin 500 mg q 6 hours to complete a total of 7 days; last dose received on 05/20 2/22    · Continue supportive care  · Continue to monitor vitals, trend fevers  · Routine lab work as needed  · PT OT

## 2022-02-23 NOTE — ASSESSMENT & PLAN NOTE
· With history of multiple falls  · Trauma workup negative during admission    · Continue fall precautions  · PT OT

## 2022-02-23 NOTE — ASSESSMENT & PLAN NOTE
· Multiple echos demonstrating critical AS    · Patient to follow-up with Cardiology/Cardiac surgery outpatient

## 2022-02-23 NOTE — ASSESSMENT & PLAN NOTE
· HS Trop: 184--> 159 --> 179  · No findings noted on telemetry during admission  · ACS ruled out during previous admission  · ECHO: EF 60% with grade 1 diastolic dysfunction  Mild-to-moderate aortic regurgitation with critical stenosis  · Monitor volume status closely    · Patient to follow-up with cardiology as outpatient

## 2022-02-23 NOTE — ASSESSMENT & PLAN NOTE
· Known history of C diff infection  · Patient recently on antibiotic course for bilateral lower extremity cellulitis  · Also started on prophylactic oral vancomycin for C diff colitis  · Last dose of vancomycin to be received on February 25

## 2022-02-25 ENCOUNTER — NURSING HOME VISIT (OUTPATIENT)
Dept: GERIATRICS | Facility: OTHER | Age: 72
End: 2022-02-25
Payer: MEDICARE

## 2022-02-25 VITALS
TEMPERATURE: 97.8 F | SYSTOLIC BLOOD PRESSURE: 134 MMHG | OXYGEN SATURATION: 96 % | HEART RATE: 75 BPM | DIASTOLIC BLOOD PRESSURE: 59 MMHG | RESPIRATION RATE: 16 BRPM

## 2022-02-25 DIAGNOSIS — I95.0 IDIOPATHIC HYPOTENSION: ICD-10-CM

## 2022-02-25 DIAGNOSIS — L03.119 RECURRENT CELLULITIS OF LOWER EXTREMITY: Primary | ICD-10-CM

## 2022-02-25 DIAGNOSIS — Z86.19 HISTORY OF CLOSTRIDIUM DIFFICILE COLITIS: ICD-10-CM

## 2022-02-25 DIAGNOSIS — R26.2 AMBULATORY DYSFUNCTION: ICD-10-CM

## 2022-02-25 DIAGNOSIS — F41.8 DEPRESSION WITH ANXIETY: ICD-10-CM

## 2022-02-25 DIAGNOSIS — R77.8 ELEVATED TROPONIN: ICD-10-CM

## 2022-02-25 DIAGNOSIS — I35.0 SEVERE AORTIC STENOSIS: ICD-10-CM

## 2022-02-25 PROCEDURE — 99309 SBSQ NF CARE MODERATE MDM 30: CPT | Performed by: NURSE PRACTITIONER

## 2022-02-25 RX ORDER — LORAZEPAM 0.5 MG/1
1 TABLET ORAL 2 TIMES DAILY
COMMUNITY
End: 2022-03-16

## 2022-02-25 NOTE — ASSESSMENT & PLAN NOTE
· Patient with elevated troponins of 184, 159, and 179   · ACS ruled out during previous admission   · Echo showed EF of 60% with grade 1 diastolic dysfunction    Mild-to-moderate aortic regurg with critical stenosis   · Follow-up with cardiology as recommended

## 2022-02-25 NOTE — ASSESSMENT & PLAN NOTE
· Mood appears stable   · Continue with current medication regimen Wellbutrin, BuSpar, and Lexapro  · Lorazepam 0 5mg bid reordered on Wednesday by dr Medardo Vasquez as per patient she was taking at home   · Continue to provide emotional support

## 2022-02-25 NOTE — PROGRESS NOTES
[facility]  [address]  [phone]  [facility]  Code 31      NAME: Candice Corona  AGE: 66 y o  SEX: female    DATE OF ENCOUNTER: 2/25/2022    Assessment and Plan     Problem List Items Addressed This Visit        Cardiovascular and Mediastinum    Idiopathic hypotension     · Blood pressure log reviewed, blood pressure is trending between 108/57 to 154/70   · Continue with midodrine 10 mg t i d  hold for systolic blood pressure greater than 130  · Continue to monitor         Severe aortic stenosis     · Most recent echo o 2/17/22 showed The aortic valve is trileaflet  The leaflets are severely thickened  The leaflets are severely calcified  There is severely reduced mobility  There is mild to moderate regurgitation  There is critical stenosis     · Denies any cp or sob  · Patient to follow-up with cardiology and cardiac surgery outpatient for workup            Other    Depression with anxiety     · Mood appears stable   · Continue with current medication regimen Wellbutrin, BuSpar, and Lexapro  · Lorazepam 0 5mg bid reordered on Wednesday by dr Maxine Chow as per patient she was taking at home   · Continue to provide emotional support         Relevant Medications    LORazepam (ATIVAN) 0 5 mg tablet    Recurrent cellulitis of lower extremity - Primary     · Was found to be septic during recent hospitalization, sepsis has now resolved  · Suspected source of infection thought to be bilateral lower extremity cellulitis caused by streptococcal infection   · Id followed while hospitalized, patient was started on Ancef t i d  while hospitalized and then transition to oral cephalexin 500 mg q 6 for a total of 7 days  · Vital signs have been stable, afebrile   · Continue to monitor         Ambulatory dysfunction     · PT/OT following  · Fall precautions, patient does have a history of multiple falls   · Trauma workup while hospitalized negative  · Out of bed as tolerated   · Encourage adequate hydration and nutrition   · Continue PT/OT for continued strength and balance training         History of Clostridium difficile colitis     · With known history of C diff infection   · Patient started on prophylactic oral vancomycin for C diff culture colitis while on antibiotic treatment for bilateral lower extremity cellulitis   · Continue vancomycin through 02/25/2022  · No BM in  afew days per patient, will start colace 100mg bid         Elevated troponin     · Patient with elevated troponins of 184, 159, and 179   · ACS ruled out during previous admission   · Echo showed EF of 60% with grade 1 diastolic dysfunction  Mild-to-moderate aortic regurg with critical stenosis   · Follow-up with cardiology as recommended               No orders of the defined types were placed in this encounter       - Counseling Documentation: patient was counseled regarding: impressions and importance of compliance with treatment    Chief Complaint     No chief complaint on file  History of Present Illness     Tonie Moran is a 57-year-old female at [facility] after recent hospitalization for severe sepsis  She presented to the ER with worsening lower extremity erythema and ambulatory dysfunction x2 days, she was found to be tachycardic and hypotensive on arrival   Patient met sepsis protocol, was felt to be likely secondary to cellulitis versus UTI  Did receive 1 L bolus but sepsis fluid resuscitation protocol held due to severe aortic stenosis  Infectious Disease was consulted and patient was treated with IV Ancef which was then transition to p o  Keflex  Patient has a known history of C diff infection and was placed on prophylactic vancomycin  She was also found to have an AMADOU with elevated troponins and elevated D-dimer  AMADOU resolved with like fluid hydration and removale of nephrotoxic agent  Elevated troponin likely secondary to acute renal dysfunction and severe sepsis, will need follow-up with cardiology  Elevated D-dimer trended down, vascular study negative for DVT  She is being seen today for follow-up on her acute on chronic medical conditions  Her comorbidities include depression, hypotension, severe aortic stenosis, recurrent cellulitis, history of C diff, and ambulatory dysfunction  Upon examination patient was lying in bed, resting  She appeared comfortable and was in no acute distress  She said she was feeling okay and offered no acute complaints  Eatting mostly between % of most meals  Her pain is well controlled  She is sleeping well at night  Her vital signs have been stable  She denies headaches, dizziness, lightheadedness, chest pain, shortness breath, cough, abdominal pain, and GI  upset  Per nursing no other acute concerns or issues at this time  The following portions of the patient's history were reviewed and updated as appropriate: allergies, current medications, past family history, past medical history, past social history, past surgical history and problem list     Review of Systems     Review of Systems   Constitutional: Positive for fatigue  Negative for activity change, appetite change, chills and fever  HENT: Negative for ear pain and sore throat  Eyes: Negative for pain and visual disturbance  Respiratory: Negative for cough and shortness of breath  Cardiovascular: Negative for chest pain and palpitations  Gastrointestinal: Positive for constipation  Negative for abdominal pain, diarrhea, nausea and vomiting  Genitourinary: Negative for difficulty urinating, dysuria, frequency, hematuria and urgency  Musculoskeletal: Positive for gait problem  Negative for arthralgias and back pain  Skin: Negative for color change and rash  Neurological: Negative for dizziness, seizures, syncope, light-headedness and headaches  Psychiatric/Behavioral: Positive for dysphoric mood  Negative for sleep disturbance  The patient is nervous/anxious  Active Problem List     Patient Active Problem List   Diagnosis    Severe sepsis (HCC)    Depression with anxiety    Recurrent cellulitis of lower extremity    Idiopathic hypotension    Depression    Anemia    Edema    Vitamin D deficiency    Vitamin B12 deficiency    Lymphedema    Chronic venous hypertension (idiopathic) with ulcer of bilateral lower extremity ([facility]    Acute kidney injury ([facility]    Severe aortic stenosis    Ambulatory dysfunction    History of Clostridium difficile colitis    Chronic back pain    Allergy to multiple antibiotics    Elevated troponin    Elevated d-dimer       Objective     /59   Pulse 75   Temp 97 8 °F (36 6 °C)   Resp 16   SpO2 96%     Physical Exam  Vitals reviewed  Constitutional:       General: She is not in acute distress  Appearance: She is not ill-appearing  Comments: Frail appearing   HENT:      Head: Normocephalic and atraumatic  Mouth/Throat:      Mouth: Mucous membranes are dry  Pharynx: No oropharyngeal exudate or posterior oropharyngeal erythema  Eyes:      General: No scleral icterus  Right eye: No discharge  Left eye: No discharge  Cardiovascular:      Rate and Rhythm: Normal rate and regular rhythm  Pulses: Normal pulses  Heart sounds: Murmur heard  Pulmonary:      Effort: Pulmonary effort is normal  No respiratory distress  Breath sounds: Normal breath sounds  No wheezing  Abdominal:      General: Bowel sounds are normal  There is no distension  Palpations: Abdomen is soft  Tenderness: There is no abdominal tenderness  Musculoskeletal:      Right lower leg: No edema  Left lower leg: No edema  Skin:     General: Skin is warm and dry  Coloration: Skin is not jaundiced  Findings: Bruising present  Comments: Chronic skin changes to b/l LE- dry scaling skin with some erythema   Neurological:      General: No focal deficit present        Mental Status: She is alert  Mental status is at baseline  Motor: Weakness present  Gait: Gait abnormal    Psychiatric:         Mood and Affect: Mood normal          Behavior: Behavior normal          Pertinent Laboratory/Diagnostic Studies:  Labs reviewed in facility paper chart  Johann MALHOTRA    Please note:  Voice-recognition software may have been used in the preparation of this document  Occasional wrong word or "sound-alike" substitutions may have occurred due to the inherent limitations of voice recognition software  Interpretation should be guided by context

## 2022-02-25 NOTE — ASSESSMENT & PLAN NOTE
· Most recent echo o 2/17/22 showed The aortic valve is trileaflet  The leaflets are severely thickened  The leaflets are severely calcified  There is severely reduced mobility  There is mild to moderate regurgitation  There is critical stenosis     · Denies any cp or sob  · Patient to follow-up with cardiology and cardiac surgery outpatient for workup

## 2022-02-25 NOTE — ASSESSMENT & PLAN NOTE
· Was found to be septic during recent hospitalization, sepsis has now resolved  · Suspected source of infection thought to be bilateral lower extremity cellulitis caused by streptococcal infection   · Id followed while hospitalized, patient was started on Ancef t i d  while hospitalized and then transition to oral cephalexin 500 mg q 6 for a total of 7 days  · Vital signs have been stable, afebrile   · Continue to monitor

## 2022-02-25 NOTE — ASSESSMENT & PLAN NOTE
· With known history of C diff infection   · Patient started on prophylactic oral vancomycin for C diff culture colitis while on antibiotic treatment for bilateral lower extremity cellulitis   · Continue vancomycin through 02/25/2022  · No BM in  afew days per patient, will start colace 100mg bid

## 2022-02-25 NOTE — ASSESSMENT & PLAN NOTE
· PT/OT following  · Fall precautions, patient does have a history of multiple falls   · Trauma workup while hospitalized negative  · Out of bed as tolerated   · Encourage adequate hydration and nutrition   · Continue PT/OT for continued strength and balance training

## 2022-02-25 NOTE — ASSESSMENT & PLAN NOTE
· Blood pressure log reviewed, blood pressure is trending between 108/57 to 154/70   · Continue with midodrine 10 mg t i d  hold for systolic blood pressure greater than 130  · Continue to monitor

## 2022-03-01 ENCOUNTER — NURSING HOME VISIT (OUTPATIENT)
Dept: GERIATRICS | Facility: OTHER | Age: 72
End: 2022-03-01
Payer: MEDICARE

## 2022-03-01 VITALS
DIASTOLIC BLOOD PRESSURE: 76 MMHG | TEMPERATURE: 97.7 F | WEIGHT: 150.1 LBS | RESPIRATION RATE: 18 BRPM | OXYGEN SATURATION: 95 % | BODY MASS INDEX: 31.37 KG/M2 | SYSTOLIC BLOOD PRESSURE: 134 MMHG | HEART RATE: 78 BPM

## 2022-03-01 DIAGNOSIS — L03.119 RECURRENT CELLULITIS OF LOWER EXTREMITY: Primary | ICD-10-CM

## 2022-03-01 PROCEDURE — 99309 SBSQ NF CARE MODERATE MDM 30: CPT | Performed by: INTERNAL MEDICINE

## 2022-03-01 NOTE — PROGRESS NOTES
[facility]  [address]   [address] [facility], [address]  47     Progress Note                             Code SNF 31  Patient Location     [facility][1] rehab    Reason for visit     Follow-up anxiety, depression, aortic stenosis, ambulatory dysfunction, recent cellulitis of bilateral lower extremities    Patients care was coordinated with nursing facility staff  Recent vitals, labs and updated medications were reviewed on [employer] system of facility  Problem List Items Addressed This Visit        Other    Recurrent cellulitis of lower extremity - Primary     History of recurrent cellulitis involving bilateral lower extremities  Patient recently completed antibiotic course  She is noted to have chronic skin changes involving bilateral lower extremity with dry scaly skin  Will prescribe Lac-Hydrin                Depression with anxiety:  Continue current meds including bupropion buspirone and escitalopram   Patient additionally remains on lorazepam 0 5 mg b i d  Severe aortic stenosis:  Continue symptomatic treatment  Follow-up with cardiology service  No signs of CHF at present    History of orthostatic hypotension:  Stable on midodrine 10 mg t i d  Ambulatory dysfunction:  Continue PT OT    History of C diff colitis:  Patient was given prophylactic vancomycin recently in the setting of antibiotic use for cellulitis  Tolerated antibiotic well  No reports of diarrhea  Elevated troponin:  Noted recently in the hospital in the setting of acute infection  Recent echo revealed EF of 60% with grade 1 diastolic dysfunction, and critical aortic stenosis    Anemia:  Hemoglobin was stable at 9 1 on 02/23  Continue iron supplements    Vitamin-D deficiency:  Continue vitamin-D supplement    Vitamin B12 deficiency:  Patient reports getting vitamin B12 injections at home    Will prescribe 1000 mcg x 1 dose    Xerosis/dry skin involving  bilateral lower extremities:  Patient is noted to have dry erythematous scaly skin involving bilateral lower extremities  Will prescribe like Lachydrin    HPI       Patient is being seen for a follow-up visit today  She is doing okay at present  Recently completed antibiotic course was cellulitis lower extremities  Patient was given prophylactic vancomycin for history of C diff colitis  Currently off all antibiotics  Patient is pleasant, no behavioral issues  She is able to make her needs known, compliant with medication  Incontinent of bowel and bladder at times  Review of Systems   Constitutional: Positive for fatigue  Negative for chills and fever  HENT: Negative for nosebleeds and rhinorrhea  Eyes: Negative for discharge and redness  Respiratory: Negative for cough, chest tightness, shortness of breath, wheezing and stridor  Cardiovascular: Negative for chest pain and leg swelling  Gastrointestinal: Negative for abdominal distention, abdominal pain and vomiting  Patient complains of having loose stools since last several weeks  Denies increased frequency   Genitourinary: Negative for dysuria, flank pain and hematuria  Musculoskeletal: Positive for gait problem  Negative for arthralgias and back pain  Skin: Positive for wound (Scattered area of skin breakdown with dry scaly and erythematous skin involving bilateral lower extremities)  Negative for pallor  Neurological: Positive for weakness (Generalized)  Negative for tremors, seizures, syncope and headaches  Psychiatric/Behavioral: Negative for agitation, behavioral problems and confusion         Past Medical History:   Diagnosis Date    Anemia     Anxiety     Chronic back pain     Hyperkalemia     Hypertension     Hypotension     Lymphedema     BRANDYN (obstructive sleep apnea)     Psychiatric disorder     depression       Past Surgical History:   Procedure Laterality Date    APPENDECTOMY      GASTRIC BYPASS      obesity Social History     Tobacco Use   Smoking Status Never Smoker   Smokeless Tobacco Never Used   Tobacco Comment    Never smoker - As per [employer]        Family History   Family history unknown: Yes        Allergies   Allergen Reactions    Aspirin     Cephalosporins      Has tolerated cefepime    Penicillins Other (See Comments)     Unknown reaction during childhood      Valium [Diazepam]     Ciprofloxacin Rash    Sulfa Antibiotics Rash     Itching, rash    Vancomycin Rash         Current Outpatient Medications:     acetaminophen (TYLENOL) 325 mg tablet, Take 2 tablets (650 mg total) by mouth every 6 (six) hours as needed for mild pain or moderate pain (Patient not taking: Reported on 2/16/2022 ), Disp: 15 tablet, Rfl: 0    buPROPion (WELLBUTRIN XL) 300 mg 24 hr tablet, Take 1 tablet (300 mg total) by mouth daily, Disp: 30 tablet, Rfl: 0    busPIRone (BUSPAR) 15 mg tablet, Take 1 tablet (15 mg total) by mouth 2 (two) times a day, Disp: 60 tablet, Rfl: 0    cholecalciferol (VITAMIN D3) 1,000 units tablet, Take 1,000 Units by mouth daily , Disp: , Rfl:     cyclobenzaprine (FLEXERIL) 5 mg tablet, Take 1 tablet (5 mg total) by mouth 2 (two) times a day as needed for muscle spasms, Disp: 10 tablet, Rfl: 0    escitalopram (LEXAPRO) 10 mg tablet, Take 1 tablet (10 mg total) by mouth daily, Disp: 30 tablet, Rfl: 0    ferrous sulfate 325 (65 Fe) mg tablet, Take 2 tablets (650 mg total) by mouth daily with breakfast, Disp: 60 tablet, Rfl: 0    lidocaine (LIDODERM) 5 %, Apply 1 patch topically daily Remove & Discard patch within 12 hours or as directed by MD (Patient not taking: Reported on 9/22/2020), Disp: 5 patch, Rfl: 2    LORazepam (ATIVAN) 0 5 mg tablet, Take 1 tablet by mouth 2 (two) times a day, Disp: , Rfl:     midodrine (PROAMATINE) 10 MG tablet, Take 1 tablet PO TID, hold for SBP >/= 130, Disp: 90 tablet, Rfl: 0    multivitamin (THERAGRAN) TABS, Take 1 tablet by mouth daily, Disp: , Rfl:    Probiotic Product (PROBIOTIC PO), Take 2 capsules by mouth daily, Disp: , Rfl:     Updated list was reviewed in [facility] of facility  Vitals:    03/01/22 0821   BP: 134/76   Pulse: 78   Resp: 18   Temp: 97 7 °F (36 5 °C)   SpO2: 95%       Physical Exam  Constitutional:       General: She is not in acute distress  Appearance: She is well-developed  She is not diaphoretic  HENT:      Head: Normocephalic and atraumatic  Nose: No rhinorrhea  Eyes:      General: No scleral icterus  Right eye: No discharge  Left eye: No discharge  Cardiovascular:      Rate and Rhythm: Normal rate and regular rhythm  Heart sounds: Murmur heard  Pulmonary:      Effort: No respiratory distress  Breath sounds: No stridor  No wheezing  Abdominal:      General: There is no distension  Palpations: Abdomen is soft  Tenderness: There is no abdominal tenderness  There is no guarding  Musculoskeletal:      Cervical back: Neck supple  Right lower leg: Edema present  Left lower leg: Edema present  Skin:     Coloration: Skin is not jaundiced or pale  Findings: Erythema (Dry scaly skin with scattered excoriation involving bilateral lower extremities) present  Neurological:      General: No focal deficit present  Mental Status: She is alert  Cranial Nerves: No cranial nerve deficit  Motor: Weakness present  Psychiatric:         Mood and Affect: Mood normal          Behavior: Behavior normal        Diagnostic Data:    Recent labs were reviewed  Labs done on 02/23/2022 revealed hemoglobin of 9 1, WBC count 4 9, platelet count 327, BUN 13, creatinine 0 82, sodium 142, potassium 4 5    Additional notes:  Vitamin B12 1000 mcg x 1 dose to be given within the facility   Start Lac-Hydrin lotion to bilateral lower extremities  Continue PT OT  Continue p r n   Benadryl    This note was electronically signed by Dr Nati Venegas

## 2022-03-01 NOTE — ASSESSMENT & PLAN NOTE
History of recurrent cellulitis involving bilateral lower extremities  Patient recently completed antibiotic course  She is noted to have chronic skin changes involving bilateral lower extremity with dry scaly skin    Will prescribe Lac-Hydrin

## 2022-03-04 ENCOUNTER — NURSING HOME VISIT (OUTPATIENT)
Dept: GERIATRICS | Facility: OTHER | Age: 72
End: 2022-03-04
Payer: MEDICARE

## 2022-03-04 VITALS
RESPIRATION RATE: 18 BRPM | TEMPERATURE: 97.4 F | OXYGEN SATURATION: 96 % | SYSTOLIC BLOOD PRESSURE: 105 MMHG | DIASTOLIC BLOOD PRESSURE: 55 MMHG | HEART RATE: 83 BPM

## 2022-03-04 DIAGNOSIS — D50.9 IRON DEFICIENCY ANEMIA, UNSPECIFIED IRON DEFICIENCY ANEMIA TYPE: ICD-10-CM

## 2022-03-04 DIAGNOSIS — I95.0 IDIOPATHIC HYPOTENSION: ICD-10-CM

## 2022-03-04 DIAGNOSIS — Z86.19 HISTORY OF CLOSTRIDIUM DIFFICILE COLITIS: ICD-10-CM

## 2022-03-04 DIAGNOSIS — L03.119 RECURRENT CELLULITIS OF LOWER EXTREMITY: Primary | ICD-10-CM

## 2022-03-04 DIAGNOSIS — R26.2 AMBULATORY DYSFUNCTION: ICD-10-CM

## 2022-03-04 DIAGNOSIS — F41.8 DEPRESSION WITH ANXIETY: ICD-10-CM

## 2022-03-04 DIAGNOSIS — I35.0 SEVERE AORTIC STENOSIS: ICD-10-CM

## 2022-03-04 DIAGNOSIS — R77.8 ELEVATED TROPONIN: ICD-10-CM

## 2022-03-04 PROCEDURE — 99309 SBSQ NF CARE MODERATE MDM 30: CPT | Performed by: NURSE PRACTITIONER

## 2022-03-04 RX ORDER — AMMONIUM LACTATE 12 G/100G
1 LOTION TOPICAL 2 TIMES DAILY
COMMUNITY

## 2022-03-04 RX ORDER — DOCUSATE SODIUM 100 MG/1
100 CAPSULE, LIQUID FILLED ORAL 2 TIMES DAILY
COMMUNITY
End: 2022-03-16

## 2022-03-05 NOTE — ASSESSMENT & PLAN NOTE
· Most recent hemoglobin 9 0 and hematocrit 27 9 on 03/02/2022, previous was 9 1 hematocrit 28 0 on 02/23/2022  · No current signs of bleeding   · Continue iron supplements   · Repeat CBC on 03/09/2022

## 2022-03-05 NOTE — ASSESSMENT & PLAN NOTE
· Blood pressure log reviewed and stable, blood pressure today 105/56   · Blood pressures have been trending between 101/58 to 148/88 over the last week   · Continue with midodrine 10 mg t i d  with hold parameters for systolic blood pressure greater than 130   · Continue to monitor and adjust midodrine as appropriate

## 2022-03-05 NOTE — ASSESSMENT & PLAN NOTE
· Most recent echo showed the aortic valve is trileaflet  Leaflets are severely thickening  The leaflets are severely calcified  There is severely reduced mobility  There is mild-to-moderate regurgitation    There is critical stenosis  · Patient denies any shortness of breath or chest pain at this time   · Will need to follow-up with cardiology and cardiac surgery opted for outpatient workup upon discharge

## 2022-03-05 NOTE — ASSESSMENT & PLAN NOTE
· Mood appears stable on exam today   · Continue with current medication regimen of Wellbutrin, BuSpar, and Lexapro   · Continue with lorazepam 0 5 mg b i d   · Continue to provide emotional support

## 2022-03-05 NOTE — ASSESSMENT & PLAN NOTE
· With elevated troponins while hospitalized, ACS rule out during previous admission   · Follow-up with cardiology as recommended on discharge

## 2022-03-05 NOTE — ASSESSMENT & PLAN NOTE
· PT/OT following  · Fall precautions, patient has history of multiple falls at home   · Patient had trauma workup while hospitalized which was negative   · Out of bed as tolerated  · Ensure adequate hydration and nutrition   · Goal is to return home   · Continue PT/OT for continued strength and balance training

## 2022-03-05 NOTE — PROGRESS NOTES
[facility]  [address]  [phone]  Tristan Knights  Code 31      NAME: Johnathon Simpson  AGE: 66 y o  SEX: female    DATE OF ENCOUNTER: 3/4/2022    Assessment and Plan     Problem List Items Addressed This Visit        Cardiovascular and Mediastinum    Idiopathic hypotension     · Blood pressure log reviewed and stable, blood pressure today 105/56   · Blood pressures have been trending between 101/58 to 148/88 over the last week   · Continue with midodrine 10 mg t i d  with hold parameters for systolic blood pressure greater than 130   · Continue to monitor and adjust midodrine as appropriate         Severe aortic stenosis     · Most recent echo showed the aortic valve is trileaflet  Leaflets are severely thickening  The leaflets are severely calcified  There is severely reduced mobility  There is mild-to-moderate regurgitation    There is critical stenosis  · Patient denies any shortness of breath or chest pain at this time   · Will need to follow-up with cardiology and cardiac surgery opted for outpatient workup upon discharge            Other    Depression with anxiety     · Mood appears stable on exam today   · Continue with current medication regimen of Wellbutrin, BuSpar, and Lexapro   · Continue with lorazepam 0 5 mg b i d   · Continue to provide emotional support         Recurrent cellulitis of lower extremity - Primary     · History of recurrent cellulitis involving bilateral lower extremities  · Patient completed course of antibiotics while hospitalized   · She is noted to have chronic continues involved bilateral lower extremity with dry scaly skin  · Lac-Hydrin was ordered earlier in week, drying has improved   · Will have wound care nurse practitioner follow-up next week  · Per patient her legs are at her baseline         Anemia     · Most recent hemoglobin 9 0 and hematocrit 27 9 on 03/02/2022, previous was 9 1 hematocrit 28 0 on 02/23/2022  · No current signs of bleeding   · Continue iron supplements   · Repeat CBC on 03/09/2022         Ambulatory dysfunction     · PT/OT following  · Fall precautions, patient has history of multiple falls at home   · Patient had trauma workup while hospitalized which was negative   · Out of bed as tolerated  · Ensure adequate hydration and nutrition   · Goal is to return home   · Continue PT/OT for continued strength and balance training         History of Clostridium difficile colitis     · Completed course of vancomycin on 02/25/2022         Elevated troponin     · With elevated troponins while hospitalized, ACS rule out during previous admission   · Follow-up with cardiology as recommended on discharge               No orders of the defined types were placed in this encounter       - Counseling Documentation: patient was counseled regarding: impressions and importance of compliance with treatment      Chief Complaint     No chief complaint on file  History of Present Illness     Harry Doan is a 57-year-old female at [facility] after recent hospitalization for severe sepsis   She presented to the ER with worsening lower extremity erythema and ambulatory dysfunction x2 days, she was found to be tachycardic and hypotensive on arrival   Patient met sepsis protocol, was felt to be likely secondary to cellulitis versus UTI   Did receive 1 L bolus but sepsis fluid resuscitation protocol held due to severe aortic stenosis   Infectious Disease was consulted and patient was treated with IV Ancef which was then transition to p o  Keflex   Patient has a known history of C diff infection and was placed on prophylactic vancomycin   Keflex nd vancomycin have been completed    She was also found to have an AMADOU with elevated troponins and elevated D-dimer   AMADOU resolved with like fluid hydration and removal of nephrotoxic agent   Elevated troponin likely secondary to acute renal dysfunction and severe sepsis, will need follow-up with cardiology   Elevated D-dimer trended down, vascular study negative for DVT   She is being seen today for follow-up on her acute on chronic medical conditions   Her comorbidities include depression, hypotension, severe aortic stenosis, recurrent cellulitis, history of C diff, and ambulatory dysfunction  Upon examination patient was lying in bed, resting   She appeared comfortable and was in no acute distress   She said she was feeling okay and offered no acute complaints   Appetite is good, eatting mostly between % of most meals   Her pain is well controlled   She is sleeping well at night  Her vital signs have been stable   She denies headaches, dizziness, lightheadedness, chest pain, shortness breath, cough, abdominal pain, and GI  upset   Per nursing no other acute concerns or issues at this time  The following portions of the patient's history were reviewed and updated as appropriate: allergies, current medications, past family history, past medical history, past social history, past surgical history and problem list     Review of Systems     Review of Systems   Constitutional: Positive for fatigue  Negative for activity change, appetite change, chills and fever  HENT: Negative for ear pain and sore throat  Eyes: Negative for pain and visual disturbance  Respiratory: Negative for cough and shortness of breath  Cardiovascular: Negative for chest pain and palpitations  Gastrointestinal: Negative for abdominal pain, constipation, diarrhea, nausea and vomiting  Genitourinary: Negative for difficulty urinating, dysuria, frequency, hematuria and urgency  Musculoskeletal: Positive for gait problem  Negative for arthralgias and back pain  Skin: Positive for wound (Scattered area of skin breakdown with dry scaly and erythematous skin involving bilateral lower extremities)  Negative for color change and rash  Neurological: Negative for dizziness, seizures, syncope, light-headedness and headaches  Psychiatric/Behavioral: Positive for dysphoric mood  Negative for sleep disturbance  The patient is nervous/anxious  Active Problem List     Patient Active Problem List   Diagnosis    Severe sepsis (HCC)    Depression with anxiety    Recurrent cellulitis of lower extremity    Idiopathic hypotension    Depression    Anemia    Edema    Vitamin D deficiency    Vitamin B12 deficiency    Lymphedema    Chronic venous hypertension (idiopathic) with ulcer of bilateral lower extremity ([address]    Acute kidney injury [facility] [address] )    Severe aortic stenosis    Ambulatory dysfunction    History of Clostridium difficile colitis    Chronic back pain    Allergy to multiple antibiotics    Elevated troponin    Elevated d-dimer       Objective     /55   Pulse 83   Temp (!) 97 4 °F (36 3 °C)   Resp 18   SpO2 96%     Physical Exam  Vitals reviewed  Constitutional:       General: She is not in acute distress  Appearance: She is not ill-appearing  Comments: Frail appearing   HENT:      Head: Normocephalic and atraumatic  Mouth/Throat:      Mouth: Mucous membranes are dry  Pharynx: No oropharyngeal exudate or posterior oropharyngeal erythema  Eyes:      General: No scleral icterus  Right eye: No discharge  Left eye: No discharge  Cardiovascular:      Rate and Rhythm: Normal rate and regular rhythm  Pulses: Normal pulses  Heart sounds: Murmur heard  Pulmonary:      Effort: Pulmonary effort is normal  No respiratory distress  Breath sounds: Normal breath sounds  No wheezing  Abdominal:      General: Bowel sounds are normal  There is no distension  Palpations: Abdomen is soft  Tenderness: There is no abdominal tenderness  Musculoskeletal:      Right lower leg: Edema present  Left lower leg: Edema present  Skin:     General: Skin is warm and dry  Coloration: Skin is not jaundiced  Findings: Bruising present        Comments: Chronic skin changes to b/l LE- dry scaling skin with some erythema   Neurological:      General: No focal deficit present  Mental Status: She is alert and oriented to person, place, and time  Mental status is at baseline  Motor: Weakness present  Gait: Gait abnormal    Psychiatric:         Mood and Affect: Mood normal          Behavior: Behavior normal          Pertinent Laboratory/Diagnostic Studies:  Labs reviewed in facility paper chart  Jaydon MALHOTRA    Please note:  Voice-recognition software may have been used in the preparation of this document  Occasional wrong word or "sound-alike" substitutions may have occurred due to the inherent limitations of voice recognition software  Interpretation should be guided by context

## 2022-03-05 NOTE — ASSESSMENT & PLAN NOTE
· History of recurrent cellulitis involving bilateral lower extremities  · Patient completed course of antibiotics while hospitalized   · She is noted to have chronic continues involved bilateral lower extremity with dry scaly skin  · Lac-Hydrin was ordered earlier in week, drying has improved   · Will have wound care nurse practitioner follow-up next week  · Per patient her legs are at her baseline

## 2022-03-08 ENCOUNTER — NURSING HOME VISIT (OUTPATIENT)
Dept: GERIATRICS | Facility: OTHER | Age: 72
End: 2022-03-08
Payer: MEDICARE

## 2022-03-08 DIAGNOSIS — R26.2 AMBULATORY DYSFUNCTION: Primary | ICD-10-CM

## 2022-03-08 DIAGNOSIS — E53.8 VITAMIN B12 DEFICIENCY: ICD-10-CM

## 2022-03-08 DIAGNOSIS — Z86.19 HISTORY OF CLOSTRIDIUM DIFFICILE COLITIS: ICD-10-CM

## 2022-03-08 DIAGNOSIS — M54.50 CHRONIC BILATERAL LOW BACK PAIN, UNSPECIFIED WHETHER SCIATICA PRESENT: ICD-10-CM

## 2022-03-08 DIAGNOSIS — G89.29 CHRONIC BILATERAL LOW BACK PAIN, UNSPECIFIED WHETHER SCIATICA PRESENT: ICD-10-CM

## 2022-03-08 DIAGNOSIS — I95.0 IDIOPATHIC HYPOTENSION: ICD-10-CM

## 2022-03-08 DIAGNOSIS — D50.9 IRON DEFICIENCY ANEMIA, UNSPECIFIED IRON DEFICIENCY ANEMIA TYPE: ICD-10-CM

## 2022-03-08 PROBLEM — R79.89 ELEVATED D-DIMER: Status: RESOLVED | Noted: 2022-02-16 | Resolved: 2022-03-08

## 2022-03-08 PROBLEM — R79.89 ELEVATED TROPONIN: Status: RESOLVED | Noted: 2022-02-16 | Resolved: 2022-03-08

## 2022-03-08 PROBLEM — R77.8 ELEVATED TROPONIN: Status: RESOLVED | Noted: 2022-02-16 | Resolved: 2022-03-08

## 2022-03-08 PROCEDURE — 99309 SBSQ NF CARE MODERATE MDM 30: CPT | Performed by: NURSE PRACTITIONER

## 2022-03-08 NOTE — ASSESSMENT & PLAN NOTE
Most recent hemoglobin 9 5 ---> 9 0, hematrocrit 29 3 -- > 27 9, at baseline  per records review baseline is between 8 1- 10 1  Continue ferrous sulfate 325 mg 2 tabs daily and MVI once a day  No overt bleeding   Will monitor CBC periodically

## 2022-03-08 NOTE — ASSESSMENT & PLAN NOTE
Continue with PT/OT for strengthening and balance training  Fall precautions  Continue with supportive care at [facility] with ADLs  Encourage adequate po hydration and nutrition  PT/OT following

## 2022-03-08 NOTE — ASSESSMENT & PLAN NOTE
H/O recurrent cellulitis involving b/l LE   S/p complete course of antibiotics during recent admission  On examination noted B/L LE chronic wounds  Wounds are in both of her legs  The wounds are boils filled with light red/pink serous drainage, there are pinpoints scabs that are black in colored, noted dry blood that has oozed from some of the wounds  Chronic non pitting edema with blanchable erythema  Denies numbness and tingling  +2 pulses  Remains afebrile, non toxic appearance and VSS  She is followed by wound care  Continue with daily wound care and elevation    Per patient redness have increased however since applying Lac-Hydrin legs are slowly getting better   Will continue to monitor

## 2022-03-08 NOTE — ASSESSMENT & PLAN NOTE
S/p completed abx course of vancomycin on 2/25/22  Asymptomatic  Remains afebrile, non toxic appearance and VSS  Reported dark stools, awaiting for stools x3 to be collected and to be send for further evaluation  Will continue to follow up

## 2022-03-08 NOTE — ASSESSMENT & PLAN NOTE
Blood pressure log reviewed and stable, BP today 146/62  SBP's have been trending between 114s to 146s over the recent days  Continue with midodrine 10 mg tid with hold parameters for systolic blood pressures greater than 130  Avoid hypotension and tight control due to fall risk  Continue to monitor and adjust meds as appropriate  entourage low sodium diet

## 2022-03-08 NOTE — PROGRESS NOTES
[facility]  Facility: [facility]   POS: STR 31  Progress Note    Chief Complaint/Reason for visit: STR follow up    Patient's care was coordinated with nursing facility staff  Recent vitals, labs, and updated medications were review on [facility] system in facility  Assessment/Plan:    Ambulatory dysfunction  Continue with PT/OT for strengthening and balance training  Fall precautions  Continue with supportive care at [facility] with ADLs  Encourage adequate po hydration and nutrition  PT/OT following    Vitamin B12 deficiency  Spoke with the office of her PCP and reports to go ahead and give Vitamin B12 injection (IM), and should be given every 30 days  Continue with supplementation  Asymptomatic  Will continue to monitor     Idiopathic hypotension  Blood pressure log reviewed and stable, BP today 146/62  SBP's have been trending between 114s to 146s over the recent days  Continue with midodrine 10 mg tid with hold parameters for systolic blood pressures greater than 130  Avoid hypotension and tight control due to fall risk  Continue to monitor and adjust meds as appropriate  entourage low sodium diet     Recurrent cellulitis of lower extremity  H/O recurrent cellulitis involving b/l LE   S/p complete course of antibiotics during recent admission  On examination noted B/L LE chronic wounds  Wounds are in both of her legs  The wounds are boils filled with light red/pink serous drainage, there are pinpoints scabs that are black in colored, noted dry blood that has oozed from some of the wounds  Chronic non pitting edema with blanchable erythema  Denies numbness and tingling  +2 pulses  Remains afebrile, non toxic appearance and VSS  She is followed by wound care  Continue with daily wound care and elevation    Per patient redness have increased however since applying Lac-Hydrin legs are slowly getting better   Will continue to monitor    Anemia  Most recent hemoglobin 9 5 ---> 9 0, hematrocrit 29 3 -- > 27 9, at baseline  per records review baseline is between 8 1- 10 1  Continue ferrous sulfate 325 mg 2 tabs daily and MVI once a day  No overt bleeding   Will monitor CBC periodically     History of Clostridium difficile colitis  S/p completed abx course of vancomycin on 2/25/22  Asymptomatic  Remains afebrile, non toxic appearance and VSS  Reported dark stools, awaiting for stools x3 to be collected and to be send for further evaluation  Will continue to follow up    Chronic Back Pain   Denies pain today  Continue with daily lidocaine patch   Denies b/ LE numbness and tingling    History of Present Illness: HPI   66year old female seen and examined for STR follow up for ambulatory dysfunction  Received patient lying in bed  Alert and oriented x3  Reports B/L LE chronic wounds  Wounds are in both of her legs are the wounds are boils filled with light red/pink serous drainage, there are pinpoints scabs that are black in colored, noted dry blood that has oozed from some of the wounds  Chronic non pitting edema with blanchable erythema  Denies numbness and tingling  +2 pulses  She is follow by wound care  Continue with daily wound care and elevation  Patient has good appetite  Per records review, her meal completion is around 51 to 75%  Her weight is stable at 152 4 pounds  Patient reported black stools and awaiting for stools to be send for further testing  Remains afebrile, non toxic appearance and vital sign stable  Called her PCP for clarification regarding Vit B-12 injection, per patient she gets a monthly shot of cyanocobalamin 1000 mcg/ml into the muscle  Awaiting PCP call  Respiratory status stable of 99% RA  Recent labs revealed hemoglobin of 9 5 (per records review baseline is between 8 1-10 1), hematocrit 29 3, WBC 6 0, platelets 521, blood glucose 73, BUN 26, creatinine 1 0, all lytes wnl, and eGFR 59   Denies chest pain, abdominal pain, lightheaded, dizziness, headache, fever, chills, or urinary discomfort  No other acute issues reported by nursing  Past Medical History: reviewed and updated  Past Medical History:   Diagnosis Date    Anemia     Anxiety     Chronic back pain     Hyperkalemia     Hypertension     Hypotension     Lymphedema     BRANDYN (obstructive sleep apnea)     Psychiatric disorder     depression     Family History: reviewed and updated  Social History: reviewed and updated  Resident Since:   Review of systems: Review of Systems   Constitutional: Positive for activity change  Negative for appetite change, chills, fatigue and fever  HENT: Negative for congestion, sore throat and trouble swallowing  Eyes: Positive for visual disturbance (uses glasses)  Respiratory: Negative for cough, chest tightness, shortness of breath and wheezing  Cardiovascular: Positive for leg swelling (chronic non pitting edema)  Negative for chest pain and palpitations  Gastrointestinal: Negative for abdominal distention, abdominal pain, constipation, diarrhea, nausea and vomiting  Genitourinary: Negative for difficulty urinating, hematuria and urgency  Musculoskeletal: Positive for arthralgias (b/l LE), gait problem and myalgias  Negative for back pain  Skin: Positive for wound (b/l LE and sacrum)  Negative for color change  Neurological: Positive for weakness (generalized)  Negative for dizziness, tremors, light-headedness and headaches  Psychiatric/Behavioral: Negative for agitation and behavioral problems  The patient is nervous/anxious  Medications:  All medication and routine orders were reviewed and updated  Allergies: Reviewed and unchanged  Consults reviewed:PT, OT, Speech and Nutrition  Labs/Diagnostics (reviewed by this provider): Copy in Chart    Imaging Reviewed:    Physical Exam    Weight: 152 4 pounds Temp: 97  BP:146/62    Pulse: 76   Resp:18  O2 Sat: 99% RA  Constitutional: Normocephalic  Orientation:Person, Month and Year     Physical Exam  Vitals and nursing note reviewed  Constitutional:       General: She is not in acute distress  Appearance: She is not ill-appearing, toxic-appearing or diaphoretic  HENT:      Head: Normocephalic and atraumatic  Nose: Nose normal       Mouth/Throat:      Mouth: Mucous membranes are moist       Pharynx: Oropharynx is clear  Eyes:      General: No scleral icterus  Right eye: No discharge  Left eye: No discharge  Cardiovascular:      Rate and Rhythm: Normal rate and regular rhythm  Pulses: Normal pulses  Heart sounds: Murmur heard  Pulmonary:      Effort: Pulmonary effort is normal  No respiratory distress  Breath sounds: Normal breath sounds  No wheezing or rales  Abdominal:      General: Bowel sounds are normal  There is no distension  Palpations: Abdomen is soft  Tenderness: There is no abdominal tenderness  There is no guarding  Musculoskeletal:      Cervical back: Normal range of motion and neck supple  No rigidity or tenderness  Skin:     General: Skin is warm  Coloration: Skin is not jaundiced  Findings: Erythema (b/l LE chronic) present  No bruising  Comments: B/l LE with chronic wounds   Neurological:      General: No focal deficit present  Mental Status: She is alert and oriented to person, place, and time  Cranial Nerves: No cranial nerve deficit  Motor: Weakness (generalized) present  Gait: Gait abnormal    Psychiatric:         Mood and Affect: Mood normal      This note was completed in part utilizing [employer] direct voice recognition software  Grammatical errors, random word insertion, spelling mistakes, and incomplete sentences may be an occasional consequence of the system secondary to software limitations, ambient noise and hardware issues  At the time of dictation, efforts were made to edit, clarify and/or correct errors    Please read the chart carefully and recognize, using context, where substitutions have occurred  If you have any questions or concerns about the context, text or information contained within the body of this dictation, please contact myself, the provider, for further clarification      Svitlana Teixeira, [facility]  3/7/40932:88 PM

## 2022-03-08 NOTE — ASSESSMENT & PLAN NOTE
Spoke with the office of her PCP and reports to go ahead and give Vitamin B12 injection (IM), and should be given every 30 days  Continue with supplementation  Asymptomatic  Will continue to monitor

## 2022-03-09 ENCOUNTER — NURSING HOME VISIT (OUTPATIENT)
Dept: WOUND CARE | Facility: HOSPITAL | Age: 72
End: 2022-03-09
Payer: MEDICARE

## 2022-03-09 DIAGNOSIS — L03.119 RECURRENT CELLULITIS OF LOWER EXTREMITY: Primary | ICD-10-CM

## 2022-03-09 DIAGNOSIS — I89.0 LYMPHEDEMA: ICD-10-CM

## 2022-03-09 PROCEDURE — 99308 SBSQ NF CARE LOW MDM 20: CPT | Performed by: NURSE PRACTITIONER

## 2022-03-09 NOTE — PHYSICAL THERAPY NOTE
PHYSICAL THERAPY EVALUATION & TREATMENT    NAME:  Kasi Francois  DATE: 02/18/22    AGE:   66 y o  Mrn:   [id number]  Length Of Stay: 2    ADMIT DX:  Cellulitis [L03 90]  Vasovagal near syncope [R55]  Bilateral cellulitis of lower leg [L03 116, L03 115]    Past Medical History:   Diagnosis Date    Anemia     Anxiety     Chronic back pain     Hyperkalemia     Hypertension     Hypotension     Lymphedema     BRANDYN (obstructive sleep apnea)     Psychiatric disorder     depression     Past Surgical History:   Procedure Laterality Date    APPENDECTOMY      GASTRIC BYPASS      obesity        Performed at least 2 patient identifiers during session: Name, Linda Rojas and ID bracelet        02/18/22 6268   PT Last Visit   PT Visit Date 02/18/22   Note Type   Note type Evaluation  (& treatment)   Pain Assessment   Pain Assessment Tool 0-10   Pain Score 7   Pain Location/Orientation Orientation: Bilateral;Location: Knee   Pain Radiating Towards localized to B knee   Pain Onset/Description Onset: Ongoing; Descriptor: Discomfort; Descriptor: Aching   Effect of Pain on Daily Activities limits independence and safety with mobility, limits standing and ambulation tolerance   Patient's Stated Pain Goal No pain   Hospital Pain Intervention(s) Repositioned; Ambulation/increased activity; Emotional support   Multiple Pain Sites No   Restrictions/Precautions   Weight Bearing Precautions Per Order No   Other Precautions Chair Alarm; Bed Alarm;Multiple lines;Telemetry; Fall Risk;Pain   Home Living   Type of [address] [place] [address] One level;Ramped entrance   Bathroom Shower/Tub Walk-in shower  (sponge bathes @kitchen sink, inability to shower d/t wounds)   [facility] Accessibility Not accessible  (pt states her RW or WC do not fit into bathroom)   [facility],[address]; Wheelchair-manual  (lift recliner chair)   Additional Comments Pt sleeps in lift recliner chair   Goes to bathroom in diapers, and cleans herself up in living room  Prior Function   Level of [place] Other (Comment)  (SUNDAY short HH mobility w/ RW, Assist with ADLs and IADLs)   Lives With Alone   Receives Help From Family;Home health  (sister comes over daily, home health VNA)   ADL Assistance Needs assistance   IADLs Needs assistance   Falls in the last 6 months 1 to 4  (pt reports 2 falls just PTA, reason for hospitalization)   Vocational Retired   Comments Pt reports that she has been ambulating 2-3 room distances with RW at [facility] level  General   Additional Pertinent History Pt admitted 2/16/2022 with c/o 2 falls, +head strike, B LE cellulitis, vasovagal syncope  Family/Caregiver Present No   Cognition   Overall Cognitive Status WFL   Arousal/Participation Cooperative   Orientation Level Oriented X4   Memory Within functional limits   Following Commands Follows one step commands with increased time or repetition   Subjective   Subjective "[facility]  I'm familiar with the therapists there and I like them "   RUE Assessment   RUE Assessment WFL   RUE Strength   RUE Overall Strength Within Functional Limits - able to perform ADL tasks with strength   LUE Assessment   LUE Assessment WFL   LUE Strength   LUE Overall Strength Within Functional Limits - able to perform ADL tasks with strength   RLE Assessment   RLE Assessment X   Strength RLE   RLE Overall Strength 2/5  (limited due to pain in knee)   LLE Assessment   LLE Assessment X   Strength LLE   LLE Overall Strength 2/5  (limited due to pain in knee)   Vision-Basic Assessment   Current Vision Wears glasses all the time   Coordination   Movements are Fluid and Coordinated 1   Sensation WFL   Light Touch   RLE Light Touch Grossly intact   LLE Light Touch Grossly intact   Proprioception   RLE Proprioception Grossly intact   LLE Proprioception Grossly Intact   Bed Mobility   Supine to Sit 2  Maximal assistance   Additional items Assist x 2;HOB elevated; Bedrails; Increased time required;Verbal cues;LE management   Transfers   Sit to Stand 4  Minimal assistance   Additional items Assist x 2; Increased time required;Verbal cues   Stand to Sit 4  Minimal assistance   Additional items Assist x 2; Increased time required;Verbal cues   Ambulation/Elevation   Gait pattern Improper Weight shift;Narrow KEVIN; Forward Flexion;Decreased foot clearance;Shuffling; Short stride; Antalgic   Gait Assistance 4  Minimal assist   Additional items Assist x 2;Verbal cues; Tactile cues   Assistive Device Rolling walker   Distance 0 ft - pt attempted static marching in place, unable to clear B foot from floor surface, pt with poor R LE support in order to offload L LE, B knee buckling in stance  Stair Management Assistance Not tested   Balance   Static Sitting Fair   Dynamic Sitting Fair -   Static Standing Fair -   Dynamic Standing Poor   Ambulatory Poor   Endurance Deficit   Endurance Deficit Yes   Activity Tolerance   Activity Tolerance Patient limited by fatigue;Patient limited by pain   Medical Staff Made Aware Spoke with CHRISTOFER, OT   Nurse Made Aware Spoke with GEO Lino pre/post session   Assessment   Prognosis Fair   Problem List Decreased strength;Decreased range of motion;Decreased endurance; Impaired balance;Decreased mobility; Decreased safety awareness; Obesity; Decreased skin integrity;Pain   Assessment Pt seen for PT evaluation, cleared by RN Oswald Kussmaul  PT consult received for mobility assessment & discharge needs  Pt admitted 2/16/2022 w/ B LE cellulitis, x2 falls with head strike, dx Severe sepsis ([facility] [address] )  Comorbidities affecting pt's fnxl performance include: anemia, anxiety, chronic back pain, falls, hypertension, gastric bypass, depression, lymphedema  Personal factors affecting pt include: ambulating with assistive device, inability to ambulate household distances, limited home support, positive fall history, anxiety, limited insight into impairments, inability to perform ADLS and inability to live alone  PTA, pt was independent with functional mobility with RW for 2-3 room distances, requiring assist for ADLS, requiring assist for IADLS and living alone in 1 story home with ramp/0 steps to enter  Pt demonstrates fnxl impairments identified in objective findings from Elderly Mobility Scale assessment, scoring 1/20, indicating near full dependence on others for completion of ADLs and mobility  AM-PAC objective tool in combination w/ Barthel Index outcome tool were used to assist in determining pt safety w/ mobility/ADLs & appropriate d/c recommendations, see above for scores  Pt is at risk of falls d/t multiple comorbidities, h/o falls, impaired balance, use of ambulatory aid, varying levels of pain , acuity of medical illness and ongoing medical treatment of primary dx  Pt's clinical presentation is currently unstable/unpredictable seen in pt's presentation of changing level of pain, increased fall risk, new onset of impairment of functional mobility, decreased endurance and new onset of weakness  PT IE requires high complexity clinical decision making, based on multiple factors which affect pt's performance w/ evaluation & therapist judgement for d/c recommendations  Pt will benefit from continued PT treatment in order to address impairments, decrease risk of falls, maximize independence w/ fnxl mobility, & ensure safety w/ mobility for transition to next level of care  Based on pt presentation & impairments, pt would most appropriately benefit from post acute STR  Barriers to Discharge Decreased caregiver support   Goals   Patient Goals "to walk better again"   [facility] Expiration Date 02/28/22   Short Term Goal #1 Patient PT goals established in order to address patient self reported goal of "to walk better again"   Pt will: complete all bed mobility in hospital bed with [facility] in order to promote increased OOB functional mobility to improve overall activity tolerance; complete all transfers with RW and S in order to increase safety with functional mobility; ambulate >50ft with RW and GALEN in order to increase safety with in facility functional mobility; improve B LE strength to >/= 3+/5 MMT t/o in order to increase safety with functional mobility and decrease risk of falls; demonstrate understanding and independence with LE strengthening HEP; improve dynamic standing balance to >/= fair+ grade in order to promote safety and increased independence with mobility; tolerate >3hrs OOB in upright position, in order to improve muscular endurance and respiratory status; improve AM-PAC score to >/= 14/24 in order to increase independence with mobility and decrease burden of care; improve Barthel Index score to >/= 30/100 in order to increase independence and decrease risk of falls; improve Elderly Mobility Scale score to >/= 5/20 in order to decrease burden of care and increase independence with functional mobility and ADLs  PT Treatment Day 1   Plan   Treatment/Interventions Functional transfer training;LE strengthening/ROM; Therapeutic exercise; Endurance training;Patient/family training;Bed mobility;Gait training;Spoke to nursing;Spoke to case management;OT;Equipment eval/education   PT Frequency 3-5x/wk   Recommendation   PT Discharge Recommendation Post acute rehabilitation services   AM-PAC Basic Mobility Inpatient   Turning in Bed Without Bedrails 2   Lying on Back to Sitting on Edge of Flat Bed 1   Moving Bed to Chair 2   Standing Up From Chair 2   Walk in Room 1   Climb 3-5 Stairs 1   Basic Mobility Inpatient Raw Score 9   Turning Head Towards Sound 4   Follow Simple Instructions 4   Low Function Basic Mobility Raw Score 17   Low Function Basic Mobility Standardized Score 27 46   Highest Level Of Mobility   -HL Goal 3: Sit at edge of bed   -HLM Highest Level of Mobility 5: Stand (1 or more minutes)   -HLM Goal Achieved Yes   Modified Leroy Scale   Modified [place] Scale 4   Barthel Index   Feeding 5   Bathing 0 Grooming Score 0   Dressing Score 5   Bladder Score 0   Bowels Score 0   Toilet Use Score 5   Transfers (Bed/Chair) Score 5   Mobility (Level Surface) Score 0   Stairs Score 0   Barthel Index Score 20   Additional Treatment Session   Start Time 1315   End Time 1345   Treatment Assessment Pt is agreeable to participate in additional mobility training post IE  Pt agreeable at 2nd attempt of gait training - transfers from EOB with MODA ([address] x2) and RW, requires [address] 2 person for ambulation of 4ft fwd/back with RW - poor foot clearance and step length, poor UE support on RW, overall shuffling quality of gait, limited due to pain in R knee > L knee  Pt needing MODA 2 person for return to semi-supine in bed, MAXA 1 for rolling R<>L in bed w/ bedrail for dependent hygiene care completion  Pt noted with multiple red/open areas on buttock, RN made aware and provided proper care/documentation of same  At end of session pt was left semi-supine with bed alarm engaged and needs w/in reach  Regarding functional mobility, pt remains significantly below her baseline level of functional mobility and is unsafe for return to home  Continue to recommend post acute STR once medically cleared for d/c from the acute care setting  Will continue skilled PT POC as able and appropriate to address functional impairments and progress towards therapy goals  Equipment Use Recommend 1-2 person assist for all OOB mobility and in room functional mobility  Additional Treatment Day 1   End of Consult   Patient Position at End of Consult Supine;Bed/Chair alarm activated; All needs within reach  (pt declined attempt at remaining OOB in recliner chair)   End of Consult Comments Based on patient's Mee Lowery Highest Level of Mobility scores today, patient currently has a goal of -[facility] Levels: 5: STAND (1 OR MORE MINUTES), to be completed with RN staffing each shift, in order to improve overall activity tolerance and mobility, [facility] No related deconditioning, and maximize outcomes for d/c from the acute care setting  The patient's AM-PAC Basic Mobility Inpatient Short Form Raw Score is 9  A Raw score of less than or equal to 16 suggests the patient may benefit from discharge to post-acute rehabilitation services  Please also refer to the recommendation of the Physical Therapist for safe discharge planning  ELDERLY MOBILITY SCALE OUTCOME MEASURE:   Older Adult & Geriatric Care: (Rashad Issa; n=36; age= 70-93)  Level of independence and EMS scores:   Score > 14 = independent in basic ADLs and safe for independent mobility maneuvers (with or without device)   Score 10-13 = borderline in terms of safe mobility and independence in ADLs (require some help with ADLs and mobility maneuvers)    Score < 10 = dependent or high degree of dependency for basic ADLs and limited mobility maneuvers (such as transfers, toileting, dressing)  Discharge recommendations and EMS scores:   Score 0-6 = post acute STR / SNF / IRF   Score 7-13 = home with high levels of care - ie: skilled caretaker, community resources, family asssistance   Score 14-20 = home   Please refer to therapist full assessment & documentation for most appropriate recommendation and details for discharge           Valentino Fordyce, PT, DPT   Available via [employer]  I # [phone]  [place] License - [id number]  3/86/5355

## 2022-03-10 ENCOUNTER — NURSING HOME VISIT (OUTPATIENT)
Dept: GERIATRICS | Facility: OTHER | Age: 72
End: 2022-03-10
Payer: MEDICARE

## 2022-03-10 DIAGNOSIS — L03.119 RECURRENT CELLULITIS OF LOWER EXTREMITY: Primary | ICD-10-CM

## 2022-03-10 DIAGNOSIS — E53.8 VITAMIN B12 DEFICIENCY: ICD-10-CM

## 2022-03-10 DIAGNOSIS — I89.0 LYMPHEDEMA: ICD-10-CM

## 2022-03-10 DIAGNOSIS — I95.0 IDIOPATHIC HYPOTENSION: ICD-10-CM

## 2022-03-10 DIAGNOSIS — G89.29 CHRONIC BILATERAL LOW BACK PAIN, UNSPECIFIED WHETHER SCIATICA PRESENT: ICD-10-CM

## 2022-03-10 DIAGNOSIS — R26.2 AMBULATORY DYSFUNCTION: ICD-10-CM

## 2022-03-10 DIAGNOSIS — M54.50 CHRONIC BILATERAL LOW BACK PAIN, UNSPECIFIED WHETHER SCIATICA PRESENT: ICD-10-CM

## 2022-03-10 DIAGNOSIS — D50.9 IRON DEFICIENCY ANEMIA, UNSPECIFIED IRON DEFICIENCY ANEMIA TYPE: ICD-10-CM

## 2022-03-10 PROCEDURE — 99309 SBSQ NF CARE MODERATE MDM 30: CPT | Performed by: NURSE PRACTITIONER

## 2022-03-10 NOTE — ASSESSMENT & PLAN NOTE
History of recurrent cellulitis on BLE  - with bilateral venous stasis, + hyperkeratosis and pappilomatosis  - absence of lymphrorrhea during visit  - the BLE is covered with dry crust and scabs  - no erythema, no obvious sign of infection, pulse not palpable due to edema  - local wound care with amlactin  - follow up next week

## 2022-03-10 NOTE — PATIENT INSTRUCTIONS
Orders Placed This Encounter   Procedures    Wound cleansing and dressings     Wound:  BLE  Discontinue previous wound order  Cleanse the BLE with soap and water, pat dry  Apply amlactin lotion to BLE  Frequency : twice a day and prn for soiling  Elevate BLE when in chair   Offload all wounds  Turn and reposition frequently, maximum of every two hours  Instruct / Assist with weight shifting every 15 - 20 minutes when in chair  Increase protein intake  Monitor for any sign of infection or worsening, inform PCP or patient's primary physician in your facility       Standing Status:   Future     Standing Expiration Date:   3/10/2023

## 2022-03-10 NOTE — PROGRESS NOTES
[address] MANAGEMENT   AND HYPERBARIC MEDICINE CENTER       Patient ID: Hannah Sanchez is a 66 y o  female Date of Birth 1950     [facility]    Performed wound round with: Wound team       Chief Complaint   Patient presents with    New Patient Visit     BLE       Wound Instructions:  Orders Placed This Encounter   Procedures    Wound cleansing and dressings     Wound:  BLE  Discontinue previous wound order  Cleanse the BLE with soap and water, pat dry  Apply amlactin lotion to BLE  Frequency : twice a day and prn for soiling  Elevate BLE when in chair   Offload all wounds  Turn and reposition frequently, maximum of every two hours  Instruct / Assist with weight shifting every 15 - 20 minutes when in chair  Increase protein intake  Monitor for any sign of infection or worsening, inform PCP or patient's primary physician in your facility  Standing Status:   Future     Standing Expiration Date:   3/10/2023       Allergies  Aspirin, Cephalosporins, Penicillins, Valium [diazepam], Ciprofloxacin, Sulfa antibiotics, and Vancomycin      Assessment & Plan:  1  Recurrent cellulitis of lower extremity  Assessment & Plan:  History of recurrent cellulitis on BLE  - with bilateral venous stasis, + hyperkeratosis and pappilomatosis  - absence of lymphrorrhea during visit  - the BLE is covered with dry crust and scabs  - no erythema, no obvious sign of infection, pulse not palpable due to edema  - local wound care with amlactin  - follow up next week    Orders:  -     Wound cleansing and dressings; Future    2  Lymphedema  Assessment & Plan: This is a chronic lymphadema,   -Elevation of BLE  - will benefit from lymphadema pump or therapy               Subjective: This is a 66year old female referred to our service for BLE   Patient was referred by Senior Care Team  Patient was seen in collaboration with the facility wound team      As per medical record review, patient have history of recurrent cellulitis on the BLE  She was recently admitted for the same problem and completed a course of antibiotic  Received patient in chair, denies pain  Patient does not have fever  Review of Systems   Constitutional: Negative  HENT: Negative  Eyes: Negative  Respiratory: Negative  Cardiovascular: Positive for leg swelling  Gastrointestinal: Negative  Endocrine: Negative  Genitourinary: Negative  Musculoskeletal: Positive for gait problem  Skin: Positive for wound  See HPI   Neurological: Negative for dizziness and headaches  Psychiatric/Behavioral: Negative for behavioral problems  Objective: There were no vitals taken for this visit  Physical Exam  Constitutional:       Appearance: Normal appearance  She is obese  HENT:      Head: Normocephalic and atraumatic  Nose: Nose normal       Mouth/Throat:      Pharynx: Oropharynx is clear  Eyes:      Conjunctiva/sclera: Conjunctivae normal    Cardiovascular:      Rate and Rhythm: Normal rate  Pulmonary:      Effort: Pulmonary effort is normal    Abdominal:      Palpations: Abdomen is soft  Tenderness: There is no abdominal tenderness  There is no guarding  Musculoskeletal:         General: No tenderness  Cervical back: Normal range of motion  Right lower leg: Edema present  Left lower leg: Edema present  Comments: + lymphadema   Skin:     General: Skin is warm  Findings: Lesion present  Comments: BLE - with no open wound, no weeping, no erythema, + hyperkeratosis and pappilomatosis  - absence of lymphrorrhea during visit  - multiple pinpoint scab and eschar     Neurological:      Mental Status: She is alert and oriented to person, place, and time  Gait: Gait abnormal    Psychiatric:         Mood and Affect: Mood normal          Behavior: Behavior normal               Procedures           Patient's care was coordinated with nursing facility staff   Recent vitals, labs and updated medications were reviewed on EMR or chart system of facility  Past Medical, surgical, social, medication and allergy history and patient's previous records were reviewed and updated as appropriate:     Patient Active Problem List   Diagnosis    Severe sepsis ([facility] [address] )    Depression with anxiety    Recurrent cellulitis of lower extremity    Idiopathic hypotension    Depression    Anemia    Edema    Vitamin D deficiency    Vitamin B12 deficiency    Lymphedema    Chronic venous hypertension (idiopathic) with ulcer of bilateral lower extremity (HCC)    Acute kidney injury ([facility] [address] )    Severe aortic stenosis    Ambulatory dysfunction    History of Clostridium difficile colitis    Chronic back pain    Allergy to multiple antibiotics     Past Medical History:   Diagnosis Date    Anemia     Anxiety     Chronic back pain     Elevated d-dimer 2022    Elevated troponin 2022    Hyperkalemia     Hypertension     Hypotension     Lymphedema     BRANDYN (obstructive sleep apnea)     Psychiatric disorder     depression     Past Surgical History:   Procedure Laterality Date    APPENDECTOMY      GASTRIC BYPASS      obesity      Social History     Socioeconomic History    Marital status:       Spouse name: None    Number of children: None    Years of education: None    Highest education level: None   Occupational History    None   Tobacco Use    Smoking status: Never Smoker    Smokeless tobacco: Never Used    Tobacco comment: Never smoker - As per [employer] Vaping Use: Never used   Substance and Sexual Activity    Alcohol use: Not Currently     Comment: Alcohol intake:   None - As per Sherrytonia Rodriguez Drug use: Never     Comment: Illicit drugs:   no - As per Sherry Coon Sexual activity: Not Currently   Other Topics Concern    None   Social History Narrative    · Most recent tobacco use screenin-      · Do you currently or have you served in the [place] [employer]:   No      · Alcohol intake:   None      · Illicit drugs:   no     - As per [employer] [facility] [employer]     Financial Resource Strain: Not on file   Food Insecurity: No Food Insecurity    Worried About Running Out of Food in the Last Year: Never true    Harvey of Food in the Last Year: Never true   Transportation Needs: No Transportation Needs    Lack of Transportation (Medical): No    Lack of Transportation (Non-Medical):  No   Physical Activity: Not on file   Stress: Not on file   Social Connections: Not on file   Intimate Partner Violence: Not on file   Housing Stability: Low Risk     Unable to Pay for Housing in the Last Year: No    Number of Places Lived in the Last Year: 1    Unstable Housing in the Last Year: No        Current Outpatient Medications:     acetaminophen (TYLENOL) 325 mg tablet, Take 2 tablets (650 mg total) by mouth every 6 (six) hours as needed for mild pain or moderate pain (Patient not taking: Reported on 2/16/2022 ), Disp: 15 tablet, Rfl: 0    ammonium lactate (LAC-HYDRIN) 12 % lotion, Apply 1 application topically 2 (two) times a day B/l LINDA, Disp: , Rfl:     buPROPion (WELLBUTRIN XL) 300 mg 24 hr tablet, Take 1 tablet (300 mg total) by mouth daily, Disp: 30 tablet, Rfl: 0    busPIRone (BUSPAR) 15 mg tablet, Take 1 tablet (15 mg total) by mouth 2 (two) times a day, Disp: 60 tablet, Rfl: 0    cholecalciferol (VITAMIN D3) 1,000 units tablet, Take 1,000 Units by mouth daily , Disp: , Rfl:     cyclobenzaprine (FLEXERIL) 5 mg tablet, Take 1 tablet (5 mg total) by mouth 2 (two) times a day as needed for muscle spasms, Disp: 10 tablet, Rfl: 0    docusate sodium (COLACE) 100 mg capsule, Take 100 mg by mouth 2 (two) times a day, Disp: , Rfl:     escitalopram (LEXAPRO) 10 mg tablet, Take 1 tablet (10 mg total) by mouth daily, Disp: 30 tablet, Rfl: 0    ferrous sulfate 325 (65 Fe) mg tablet, Take 2 tablets (650 mg total) by mouth daily with breakfast, Disp: 60 tablet, Rfl: 0    lidocaine (LIDODERM) 5 %, Apply 1 patch topically daily Remove & Discard patch within 12 hours or as directed by MD (Patient not taking: Reported on 9/22/2020), Disp: 5 patch, Rfl: 2    LORazepam (ATIVAN) 0 5 mg tablet, Take 1 tablet by mouth 2 (two) times a day, Disp: , Rfl:     midodrine (PROAMATINE) 10 MG tablet, Take 1 tablet PO TID, hold for SBP >/= 130, Disp: 90 tablet, Rfl: 0    multivitamin (THERAGRAN) TABS, Take 1 tablet by mouth daily, Disp: , Rfl:   Family History   Family history unknown: Yes              Coordination of Care: Wound team aware of the treatment plan  Facility nurse will provide wound treatment and monitor the wound for any changes  Patient / Staff education : Patient / Staff was given education on sign of infection and pressure ulcer prevention  Patient/ Staff verbalized understanding     Follow up :  Return in about 1 week (around 3/16/2022)  Voice-recognition software may have been used in the preparation of this document  Occasional wrong word or "sound-alike" substitutions may have occurred due to the inherent limitations of voice recognition software  Interpretation should be guided by context        ROSCOE Louie

## 2022-03-13 NOTE — ASSESSMENT & PLAN NOTE
Patient has a h/o recurrent cellulitis on BLE since 2003 with bilateral venous stasis +hyperkeratosis and pappilomatosis  Negative for lymprorrhea per wound care nurse notes  BLE noted dry crust with black and dry blood, scabs and dry blood on BLE  On examination erythema improving since patient is continuing with elevation   Wound following continue with recommendations  Remains afebrile, non toxic appearance and VSS

## 2022-03-13 NOTE — ASSESSMENT & PLAN NOTE
Blood pressure log reviewed and stable, BP today 122/74  SBP's have been trending between 114s to 146s over the recent days  Continue with midodrine 10 mg tid with hold parameters for systolic blood pressures greater than 130  Avoid hypotension and tight control due to fall risk  Continue to monitor and adjust meds as appropriate  entourage low sodium diet   Ensure adequate po hydration

## 2022-03-13 NOTE — ASSESSMENT & PLAN NOTE
Spoke with the office of her PCP and reports to go ahead and give Vitamin B12 injection (IM), and should be given every 30 days  Continue with supplementation  Spoke with nurse manager to go ahead and give to patient   Asymptomatic  Will continue to monitor

## 2022-03-13 NOTE — ASSESSMENT & PLAN NOTE
Continue with PT/OT for strengthening and balance training  Fall and safety precautions  Continue with 24/7 supportive care at [facility] with ADLs  Encourage a/dequate po hydration and nutrition  PT/OT following

## 2022-03-13 NOTE — ASSESSMENT & PLAN NOTE
Denies pain today  Continue with daily lidocaine patch   Denies b/ LE numbness and tingling  Continue pt/ot for strength and balance training  oob for meals as tolerated

## 2022-03-13 NOTE — PROGRESS NOTES
Aida Martinez  Facility: [facility][1]   POS: STR 31  Progress Note    Chief Complaint/Reason for visit: STR follow up    Patient's care was coordinated with nursing facility staff  Recent vitals, labs, and updated medications were review on [facility] system in facility       Assessment/Plan:    Recurrent cellulitis of lower extremity  Patient has a h/o recurrent cellulitis on BLE since 2003 with bilateral venous stasis +hyperkeratosis and pappilomatosis  Negative for lymprorrhea per wound care nurse notes  BLE noted dry crust with black and dry blood, scabs and dry blood on BLE  On examination erythema improving since patient is continuing with elevation   Wound following continue with recommendations  Remains afebrile, non toxic appearance and VSS    Lymphedema  Chronic lymphedema  Wound following continue with recommendations  Might benefit from a lymphedema pump and compliance with therapy  Continue to elevate BLE and avoid tight socks   Remains afebrile, non toxic appearance and VSS    Ambulatory dysfunction  Continue with PT/OT for strengthening and balance training  Fall and safety precautions  Continue with 24/7 supportive care at [facility] with ADLs  Encourage a/dequate po hydration and nutrition  PT/OT following    Anemia  Most recent hemoglobin 9 5 ---> 9 0, hematrocrit 29 3 -- > 27 9, at baseline  per records review baseline is between 8 1- 10 1  Continue ferrous sulfate 325 mg 2 tabs daily and MVI once a day  No overt bleeding   Will monitor CBC periodically     Vitamin B12 deficiency  Spoke with the office of her PCP and reports to go ahead and give Vitamin B12 injection (IM), and should be given every 30 days  Continue with supplementation  Spoke with nurse manager to go ahead and give to patient   Asymptomatic  Will continue to monitor     Idiopathic hypotension  Blood pressure log reviewed and stable, BP today 122/74  SBP's have been trending between 114s to 146s over the recent days  Continue with midodrine 10 mg tid with hold parameters for systolic blood pressures greater than 130  Avoid hypotension and tight control due to fall risk  Continue to monitor and adjust meds as appropriate  entourage low sodium diet   Ensure adequate po hydration     Chronic back pain  Denies pain today  Continue with daily lidocaine patch   Denies b/ LE numbness and tingling  Continue pt/ot for strength and balance training  oob for meals as tolerated     History of Present Illness: HPI   67-year old female seen and examined for STR follow up for ambulatory dysfunction  Received patient sitting in chair  Alert and oriented x3  Reports B/L LE chronic wounds  Wounds are in b/l legs  Wounds appears as boils filled with light red/pink serous drainage, there are pinpoints scabs that are black in colored, noted dry blood that has oozed from some of the wounds  Chronic non pitting edema with blanchable erythema  Denies numbness and tingling  +2 pulses  She is follow by wound care  Continue with daily wound care and elevation  Patient has good appetite  Per records review, her meal completion is around 51 to 75%  Her weight is stable at 152 4 pounds  Patient reported black stools and awaiting for stools to be send for further testing  Remains afebrile, non toxic appearance and vital sign stable  Called her PCP for clarification regarding Vit B-12 injection, per patient she gets a monthly shot of cyanocobalamin 1000 mcg/ml into the muscle  She is okay to receive Vit B12 injectio, nurse manager informed, explained reason for Vit b12  Respiratory status stable of 99% RA  Recent labs revealed hemoglobin of 9 5 (per records review baseline is between 8 1-10 1), hematocrit 29 3, WBC 6 0, platelets 153, blood glucose 73, BUN 26, creatinine 1 0, all lytes wnl, and eGFR 59  Denies chest pain, abdominal pain, lightheaded, dizziness, headache, fever, chills, or urinary discomfort  No other acute issues reported by nursing        Past Medical History: reviewed and updated  Past Medical History:   Diagnosis Date    Anemia     Anxiety     Chronic back pain     Hyperkalemia     Hypertension     Hypotension     Lymphedema     BRANDYN (obstructive sleep apnea)     Psychiatric disorder     depression     Family History: reviewed and updated  Social History: reviewed and updated  Resident Since:   Review of systems: Review of Systems   Constitutional: Positive for activity change  Negative for appetite change, chills, fatigue and fever  HENT: Negative for congestion, sore throat and trouble swallowing  Eyes: Positive for visual disturbance (uses glasses)  Respiratory: Negative for cough, chest tightness, shortness of breath and wheezing  Cardiovascular: Positive for leg swelling (chronic non pitting edema)  Negative for chest pain and palpitations  Gastrointestinal: Negative for abdominal distention, abdominal pain, constipation, diarrhea, nausea and vomiting  Genitourinary: Negative for difficulty urinating, hematuria and urgency  Musculoskeletal: Positive for arthralgias (b/l LE), gait problem and myalgias  Negative for back pain  Skin: Positive for color change (chronic erythema in the setting of lymphedema) and wound (b/l LE and sacrum)  Neurological: Positive for weakness (generalized)  Negative for dizziness, tremors, light-headedness and headaches  Psychiatric/Behavioral: Negative for agitation and behavioral problems  The patient is nervous/anxious  Medications: All medication and routine orders were reviewed and updated  Allergies: Reviewed and unchanged  Consults reviewed:PT, OT, Speech and Nutrition  Labs/Diagnostics (reviewed by this provider): Copy in Chart    Imaging Reviewed:    Physical Exam    Weight: 152 4 pounds Temp: 97  BP:122/74   Pulse:   76 Resp:18  O2 Sat: 99% RA  Constitutional: Normocephalic  Orientation:Person, Month and Year     Physical Exam  Vitals and nursing note reviewed     Constitutional: General: She is not in acute distress  Appearance: She is not ill-appearing, toxic-appearing or diaphoretic  HENT:      Head: Normocephalic and atraumatic  Nose: Nose normal       Mouth/Throat:      Mouth: Mucous membranes are moist       Pharynx: Oropharynx is clear  Eyes:      General: No scleral icterus  Right eye: No discharge  Left eye: No discharge  Cardiovascular:      Rate and Rhythm: Normal rate and regular rhythm  Pulses: Normal pulses  Heart sounds: Murmur heard  Pulmonary:      Effort: Pulmonary effort is normal  No respiratory distress  Breath sounds: Normal breath sounds  No wheezing or rales  Abdominal:      General: Bowel sounds are normal  There is no distension  Palpations: Abdomen is soft  Tenderness: There is no abdominal tenderness  There is no guarding  Musculoskeletal:      Cervical back: Normal range of motion and neck supple  No rigidity or tenderness  Skin:     General: Skin is warm  Coloration: Skin is not jaundiced  Findings: Erythema (b/l LE chronic) present  No bruising  Comments: B/l LE with chronic wounds   Neurological:      General: No focal deficit present  Mental Status: She is alert and oriented to person, place, and time  Cranial Nerves: No cranial nerve deficit  Motor: Weakness (generalized) present  Gait: Gait abnormal    Psychiatric:         Mood and Affect: Mood normal      This note was completed in part utilizing [employer] direct voice recognition software  Grammatical errors, random word insertion, spelling mistakes, and incomplete sentences may be an occasional consequence of the system secondary to software limitations, ambient noise and hardware issues  At the time of dictation, efforts were made to edit, clarify and/or correct errors  Please read the chart carefully and recognize, using context, where substitutions have occurred    If you have any questions or concerns about the context, text or information contained within the body of this dictation, please contact myself, the provider, for further clarification      Elwood Jeans, CRNP  3/13/27358:02 PM

## 2022-03-13 NOTE — ASSESSMENT & PLAN NOTE
Chronic lymphedema  Wound following continue with recommendations  Might benefit from a lymphedema pump and compliance with therapy  Continue to elevate BLE and avoid tight socks   Remains afebrile, non toxic appearance and VSS

## 2022-03-14 ENCOUNTER — NURSING HOME VISIT (OUTPATIENT)
Dept: GERIATRICS | Facility: OTHER | Age: 72
End: 2022-03-14
Payer: MEDICARE

## 2022-03-14 DIAGNOSIS — G89.29 CHRONIC BILATERAL LOW BACK PAIN, UNSPECIFIED WHETHER SCIATICA PRESENT: ICD-10-CM

## 2022-03-14 DIAGNOSIS — M54.50 CHRONIC BILATERAL LOW BACK PAIN, UNSPECIFIED WHETHER SCIATICA PRESENT: ICD-10-CM

## 2022-03-14 DIAGNOSIS — D50.9 IRON DEFICIENCY ANEMIA, UNSPECIFIED IRON DEFICIENCY ANEMIA TYPE: ICD-10-CM

## 2022-03-14 DIAGNOSIS — I95.0 IDIOPATHIC HYPOTENSION: ICD-10-CM

## 2022-03-14 DIAGNOSIS — R26.2 AMBULATORY DYSFUNCTION: Primary | ICD-10-CM

## 2022-03-14 DIAGNOSIS — E53.8 VITAMIN B12 DEFICIENCY: ICD-10-CM

## 2022-03-14 DIAGNOSIS — I89.0 LYMPHEDEMA: ICD-10-CM

## 2022-03-14 DIAGNOSIS — L03.119 RECURRENT CELLULITIS OF LOWER EXTREMITY: ICD-10-CM

## 2022-03-14 PROCEDURE — 99309 SBSQ NF CARE MODERATE MDM 30: CPT | Performed by: NURSE PRACTITIONER

## 2022-03-15 NOTE — ASSESSMENT & PLAN NOTE
Blood pressure log reviewed and stable, BP today 155/89  SBP's have been trending between 117s to 155s (3/11 to 3/14)  Continue with midodrine 10 mg tid with hold parameters for systolic blood pressures greater than 130  Avoid hypotension and tight control due to fall risk  Continue to monitor and adjust meds as appropriate  Enctourage low sodium diet   Ensure adequate po hydration

## 2022-03-15 NOTE — ASSESSMENT & PLAN NOTE
Spoke with the office of her PCP and reports to go ahead and give Vitamin B12 injection (IM), and should be given every 30 days  Continue with supplementation  F/U with PCP for next dose post discharge  Will continue to monitor

## 2022-03-15 NOTE — PROGRESS NOTES
Oliver Carlos  Facility: [facility]   POS: STR 31  Progress Note    Chief Complaint/Reason for visit: STR follow up    Patient's care was coordinated with nursing facility staff  Recent vitals, labs, and updated medications were review on [facility] system in facility       Assessment/Plan:    Ambulatory dysfunction  Continue with PT/OT for strengthening and balance training  Continue to implement Fall and safety precautions  Continue with 24/7 supportive care at [facility] with ADLs  Encourage a/dequate po hydration and nutrition  PT/OT following    Recurrent cellulitis of lower extremity  Patient has a h/o recurrent cellulitis on BLE since 2003 with bilateral venous stasis +hyperkeratosis and pappilomatosis  Negative for lymprorrhea per wound care nurse notes  BLE noted dry crust with black and dry blood, scabs and dry blood on BLE  On examination erythema improving since patient is continuing with elevation   Wound following continue with recommendations  Remains afebrile, non toxic appearance and VSS    Lymphedema  Chronic lymphedema  Wound following continue with recommendations  Might benefit from a lymphedema pump and compliance with therapy  Continue to elevate BLE and avoid tight socks   Remains afebrile, non toxic appearance and VSS    Anemia  Most recent hemoglobin 9 5 ---> 9 0, hematrocrit 29 3 -- > 27 9, at baseline  per records review baseline is between 8 1- 10 1  Continue ferrous sulfate 325 mg 2 tabs daily and MVI once a day  No overt bleeding   Will monitor CBC periodically     Vitamin B12 deficiency  Spoke with the office of her PCP and reports to go ahead and give Vitamin B12 injection (IM), and should be given every 30 days  Continue with supplementation  F/U with PCP for next dose post discharge  Will continue to monitor     Idiopathic hypotension  Blood pressure log reviewed and stable, BP today 155/89  SBP's have been trending between 117s to 155s (3/11 to 3/14)  Continue with midodrine 10 mg tid with hold parameters for systolic blood pressures greater than 130  Avoid hypotension and tight control due to fall risk  Continue to monitor and adjust meds as appropriate  Enctourage low sodium diet   Ensure adequate po hydration     Chronic back pain  Denies pain today  Continue with daily lidocaine patch   Denies b/ LE numbness and tingling  Continue pt/ot for strength and balance training  oob for meals as tolerated   Continue 24/7 supportive care at [facility] with ADLs    History of Present Illness: HPI   67-year old female seen and examined for STR follow up for ambulatory dysfunction  Received patient lying in bed  Alert and oriented x3  Reports B/L LE chronic wounds  Wounds are in b/l legs  Wounds appears as boils filled with light red/pink serous drainage, there are pinpoints scabs that are black in colored, noted dry blood that has oozed from some of the wounds  Chronic non pitting edema with blanchable erythema  Denies numbness and tingling  +2 pulses  She is follow by wound care  Continue with daily wound care and elevation  Patient has good appetite  Per records review, her meal completion is around 51 to 75%  Her weight is stable at 152 4 pounds  Patient reported black stools and awaiting for stools to be send for further testing  Remains afebrile, non toxic appearance and vital sign stable  Respiratory status stable of 98% RA  Recent labs revealed hemoglobin of 9 5 (per records review baseline is between 8 1-10 1), hematocrit 29 3, WBC 6 0, platelets 016, blood glucose 73, BUN 26, creatinine 1 0, all lytes wnl, and eGFR 59  Denies chest pain, abdominal pain, lightheaded, dizziness, headache, fever, chills, or urinary discomfort  No other acute issues reported by nursing        Past Medical History: reviewed and updated  Past Medical History:   Diagnosis Date    Anemia     Anxiety     Chronic back pain     Hyperkalemia     Hypertension     Hypotension     Lymphedema     BRANDYN (obstructive sleep apnea)     Psychiatric disorder     depression     Family History: reviewed and updated  Social History: reviewed and updated  Resident Since:   Review of systems: Review of Systems   Constitutional: Positive for activity change  Negative for appetite change, chills, fatigue and fever  HENT: Negative for congestion, sore throat and trouble swallowing  Eyes: Positive for visual disturbance (uses glasses)  Respiratory: Negative for cough, chest tightness, shortness of breath and wheezing  Cardiovascular: Positive for leg swelling (chronic non pitting edema)  Negative for chest pain and palpitations  Gastrointestinal: Negative for abdominal distention, abdominal pain, constipation, diarrhea, nausea and vomiting  Genitourinary: Negative for difficulty urinating, hematuria and urgency  Musculoskeletal: Positive for arthralgias (b/l LE), gait problem and myalgias  Negative for back pain  Skin: Positive for color change (chronic erythema in the setting of lymphedema) and wound (b/l LE and sacrum)  Neurological: Positive for weakness (generalized)  Negative for dizziness, tremors, light-headedness and headaches  Psychiatric/Behavioral: Negative for agitation and behavioral problems  The patient is nervous/anxious  Medications: All medication and routine orders were reviewed and updated  Allergies: Reviewed and unchanged  Consults reviewed:PT, OT, Speech and Nutrition  Labs/Diagnostics (reviewed by this provider): Copy in Chart    Imaging Reviewed:    Physical Exam    Weight: 152 4 pounds Temp: 97  BP:155/89   Pulse:   73  Resp:18  O2 Sat: 97% RA  Constitutional: Normocephalic  Orientation:Person, Month and Year     Physical Exam  Vitals and nursing note reviewed  Constitutional:       General: She is not in acute distress  Appearance: She is not ill-appearing, toxic-appearing or diaphoretic  HENT:      Head: Normocephalic and atraumatic        Nose: Nose normal       Mouth/Throat: Mouth: Mucous membranes are moist       Pharynx: Oropharynx is clear  Eyes:      General: No scleral icterus  Right eye: No discharge  Left eye: No discharge  Cardiovascular:      Rate and Rhythm: Normal rate and regular rhythm  Pulses: Normal pulses  Heart sounds: Murmur heard  Pulmonary:      Effort: Pulmonary effort is normal  No respiratory distress  Breath sounds: Normal breath sounds  No wheezing or rales  Abdominal:      General: Bowel sounds are normal  There is no distension  Palpations: Abdomen is soft  Tenderness: There is no abdominal tenderness  There is no guarding  Musculoskeletal:      Cervical back: Normal range of motion and neck supple  No rigidity or tenderness  Skin:     General: Skin is warm  Coloration: Skin is not jaundiced  Findings: Erythema (b/l LE chronic) present  No bruising  Comments: B/l LE with chronic wounds   Neurological:      General: No focal deficit present  Mental Status: She is alert and oriented to person, place, and time  Cranial Nerves: No cranial nerve deficit  Motor: Weakness (generalized) present  Gait: Gait abnormal    Psychiatric:         Mood and Affect: Mood normal      This note was completed in part utilizing [employer] direct voice recognition software  Grammatical errors, random word insertion, spelling mistakes, and incomplete sentences may be an occasional consequence of the system secondary to software limitations, ambient noise and hardware issues  At the time of dictation, efforts were made to edit, clarify and/or correct errors  Please read the chart carefully and recognize, using context, where substitutions have occurred  If you have any questions or concerns about the context, text or information contained within the body of this dictation, please contact myself, the provider, for further clarification      ROSCOE Perales  3/14/[id number]:02 PM

## 2022-03-15 NOTE — ASSESSMENT & PLAN NOTE
Denies pain today  Continue with daily lidocaine patch   Denies b/ LE numbness and tingling  Continue pt/ot for strength and balance training  oob for meals as tolerated   Continue 24/7 supportive care at [address] [place] [address] with ADLs

## 2022-03-15 NOTE — ASSESSMENT & PLAN NOTE
Continue with PT/OT for strengthening and balance training  Continue to implement Fall and safety precautions  Continue with 24/7 supportive care at [facility] with ADLs  Encourage a/dequate po hydration and nutrition  PT/OT following

## 2022-03-16 ENCOUNTER — NURSING HOME VISIT (OUTPATIENT)
Dept: GERIATRICS | Facility: OTHER | Age: 72
End: 2022-03-16
Payer: MEDICARE

## 2022-03-16 ENCOUNTER — NURSING HOME VISIT (OUTPATIENT)
Dept: WOUND CARE | Facility: HOSPITAL | Age: 72
End: 2022-03-16
Payer: MEDICARE

## 2022-03-16 DIAGNOSIS — I89.0 LYMPHEDEMA: ICD-10-CM

## 2022-03-16 DIAGNOSIS — G89.29 CHRONIC BILATERAL LOW BACK PAIN, UNSPECIFIED WHETHER SCIATICA PRESENT: Primary | ICD-10-CM

## 2022-03-16 DIAGNOSIS — R26.2 AMBULATORY DYSFUNCTION: ICD-10-CM

## 2022-03-16 DIAGNOSIS — E55.9 VITAMIN D DEFICIENCY: ICD-10-CM

## 2022-03-16 DIAGNOSIS — D50.9 IRON DEFICIENCY ANEMIA, UNSPECIFIED IRON DEFICIENCY ANEMIA TYPE: ICD-10-CM

## 2022-03-16 DIAGNOSIS — M54.50 CHRONIC BILATERAL LOW BACK PAIN, UNSPECIFIED WHETHER SCIATICA PRESENT: Primary | ICD-10-CM

## 2022-03-16 DIAGNOSIS — I35.0 SEVERE AORTIC STENOSIS: ICD-10-CM

## 2022-03-16 DIAGNOSIS — F32.A DEPRESSION, UNSPECIFIED DEPRESSION TYPE: ICD-10-CM

## 2022-03-16 DIAGNOSIS — E53.8 VITAMIN B12 DEFICIENCY: ICD-10-CM

## 2022-03-16 DIAGNOSIS — L03.119 RECURRENT CELLULITIS OF LOWER EXTREMITY: Primary | ICD-10-CM

## 2022-03-16 DIAGNOSIS — I95.0 IDIOPATHIC HYPOTENSION: ICD-10-CM

## 2022-03-16 DIAGNOSIS — L03.119 RECURRENT CELLULITIS OF LOWER EXTREMITY: ICD-10-CM

## 2022-03-16 DIAGNOSIS — F41.8 DEPRESSION WITH ANXIETY: ICD-10-CM

## 2022-03-16 PROCEDURE — 99308 SBSQ NF CARE LOW MDM 20: CPT | Performed by: NURSE PRACTITIONER

## 2022-03-16 PROCEDURE — 99316 NF DSCHRG MGMT 30 MIN+: CPT | Performed by: NURSE PRACTITIONER

## 2022-03-16 RX ORDER — DOCUSATE SODIUM 100 MG/1
100 CAPSULE, LIQUID FILLED ORAL 2 TIMES DAILY
COMMUNITY

## 2022-03-16 RX ORDER — LORAZEPAM 0.5 MG/1
0.5 TABLET ORAL EVERY 8 HOURS PRN
COMMUNITY

## 2022-03-16 RX ORDER — LORAZEPAM 0.5 MG/1
0.5 TABLET ORAL
COMMUNITY

## 2022-03-16 NOTE — LETTER
March 16, 2022     Elias Jacob MD  [address] [place] [address]    Patient: Amado Escobedo   YOB: 1950   Date of Visit: 3/16/2022       Dear Dr Jose Ramon Grossman: Thank you for referring Rose Mary Murray to me for evaluation  Below are my notes for this consultation  If you have questions, please do not hesitate to call me  I look forward to following your patient along with you  Sincerely,        ROSCOE Dorado        CC: No Recipients  Frederick Olmos, [address]   3/16/2022 10:21 PM  Incomplete  [facility]  [address]  [phone]  DISCHARGE SUMMARY  POS: STR 31  Facility: [facility]  Code Status: Full Code    NAME: Amado Escobedo  AGE: 66 y o  SEX: female  DATE OF ADMISSION: 2/22/22 DATE OF DISCHARGE: 03/17/22 DISCHARGE DISPOSITION: Home    Reason for admission: Patient was admitted from [address] [place] [address] for rehabilitation after hospitalization for  worsening lower extremity erythema and ambulatory dysfunction for 2 days  Admission Diagnoses: As mentioned above   Additional Problems:   Discharge Diagnoses: See problem list follow up recommendations below      Chronic back pain  Denies pain today  Continue with daily lidocaine patch   Denies b/ LE numbness and tingling  Continue pt/ot for strength and balance training  oob for meals as tolerated   Continue 24/7 supportive care at [facility] with ADLs  F/u with family provider once discharge    Ambulatory dysfunction  Continue with PT/OT for strengthening and balance training  Continue to implement Fall and safety precautions  Continue with 24/7 supportive care at [facility] with ADLs  Encourage a/dequate po hydration and nutrition  PT/OT following  F/u with primary care provider once discharge    Lymphedema  Chronic lymphedema  Wound following continue with recommendations  Might benefit from a lymphedema pump and compliance with therapy  Continue to elevate BLE and avoid tight socks   Remains afebrile, non toxic appearance and VSS  F/u with wound care as an outpatient  Continue to f/u with wound care recommendations and daily wound care  Continue to use lymphedema pump    Vitamin B12 deficiency  Spoke with the office of her PCP and reports to go ahead and give Vitamin B12 injection (IM), and should be given every 30 days  Continue with supplementation  F/U with PCP for next dose post discharge  Will continue to monitor   F/U with primary care provider once discharge    Vitamin D deficiency  Continue with vitamin D supplementation  F/u with primary care for management    Anemia  Most recent hemoglobin 9 5 ---> 9 0, hematrocrit 29 3 -- > 27 9, at baseline  per records review baseline is between 8 1- 10 1  Continue ferrous sulfate 325 mg 2 tabs daily and MVI once a day  No overt bleeding   F/u with primary care provider for management    Depression  Continue with buspar 15 mg bid, escitalopram 10 mg daily, bupropion HCl ER 24 hour 300 mg daily and lorazepam 0 5 at bedtime and PRN dose of 0 5 mg q 8 hours   F/U with primary care provider for management    Recurrent cellulitis of lower extremity  Patient has a h/o recurrent cellulitis on BLE since 2003 with bilateral venous stasis +hyperkeratosis and pappilomatosis  Negative for lymprorrhea per wound care nurse notes  BLE noted dry crust with black and dry blood, scabs and dry blood on BLE  On examination erythema improving since patient is continuing with elevation   Wound following continue with recommendations  Remains afebrile, non toxic appearance and VSS  F/U with wound care center once discharge  Continue to follow up with wound care recommendations and daily wound care  F/U with primary care provider once discharge    Depression with anxiety  Mood stable no behaviors reported  Continue with current medication regimen   Continue with buspar 15 mg bid, escitalopram 10 mg daily, bupropion HCl ER 24 hour 300 mg daily and lorazepam 0 5 at bedtime and PRN dose of 0 5 mg q 8 hours   F/U with primary care provider for management    Severe aortic stenosis  Recent ECHO showed the aortic valve is trileaflet  Leaflets are severerly thickening  The leaflest are severely calcified with reduced mobility  + for mild to moderate regurgitation  There is critical stenosis  Denies shortness of breath, palpitations or chest pain during examination  F/u with cardiology and cardiac surgery opted for outpatient workup upon discharge      Idiopathic hypotension  Blood pressure log reviewed and stable, BP today 127/85  SBP's have been trending between 117s to 155s (3/11 to 3/16)  Continue with midodrine 10 mg tid with hold parameters for systolic blood pressures greater than 130  Avoid hypotension and tight control due to fall risk  Continue to monitor and adjust meds as appropriate  Enctourage low sodium diet   Ensure adequate po hydration   F/u with cardiology upon discharge     Course of stay: Patient was admitted to  [facility]   for rehabilitation following hospitalization  Patient originally presented to [address] [place] [address] on 2/16/22 due to severe sepsis  Patient presented with recurrent bilateral lower extremity cellulitis that has being worsening with erythema and ambulatory dysfunction for 2 days  Additionally was found to be tachycardic and hypotensive on arrival  Received 1 liter of normal saline and intravenous antibiotics later on transitioned to po keflex  At the [facility] she has followed by wound care receiving daily wound care  She is recommended to continue with daily wound care at home  During the resident's stay at [facility], she received skilled nursing care, PT, OT, dietitian support, social service support, and medical management  Patient is scheduled to be discharge home on 03/17/22  During examination she denies chest pain, abdominal pain, nausea, vomiting, diarrhea, constipation, fever, chills, lightheaded, dizziness, headache or urinary discomfort       PT/OT Recommendations for home discharge  Minimum assist for bed mobility, standby assist for transferring, ambulating 120 feet with a rolling walker (supervision), 6 stairs (contact guard), balance is fair +, contact guard for upper body dressing, minimum assist for lower body dressing minimum assist for toiletting  Returning home with PT, OT, VNA services  --LOS 24 days  Labs and testing performed during stay:  CBC and BMP reviewed on 3/8/22- hemoglobin 9 5, hematocrit 29 3, WBC 6 0, platelets 139, blood glucose 73, BUN 26, creatinine 1 0, sodium 140, potassium 4 5, calcium 9 4, EGFR 59    Discharge Medications: See discharge medication list which was reviewed in epic and compared to facility orders for accuracy      Current Outpatient Medications:     acetaminophen (TYLENOL) 325 mg tablet, Take 2 tablets (650 mg total) by mouth every 6 (six) hours as needed for mild pain or moderate pain, Disp: 15 tablet, Rfl: 0    ammonium lactate (LAC-HYDRIN) 12 % lotion, Apply 1 application topically 2 (two) times a day B/l LE, Disp: , Rfl:     buPROPion (WELLBUTRIN XL) 300 mg 24 hr tablet, Take 1 tablet (300 mg total) by mouth daily, Disp: 30 tablet, Rfl: 0    busPIRone (BUSPAR) 15 mg tablet, Take 1 tablet (15 mg total) by mouth 2 (two) times a day, Disp: 60 tablet, Rfl: 0    cholecalciferol (VITAMIN D3) 1,000 units tablet, Take 1,000 Units by mouth daily , Disp: , Rfl:     cyclobenzaprine (FLEXERIL) 5 mg tablet, Take 1 tablet (5 mg total) by mouth 2 (two) times a day as needed for muscle spasms, Disp: 10 tablet, Rfl: 0    docusate sodium (COLACE) 100 mg capsule, Take 100 mg by mouth 2 (two) times a day, Disp: , Rfl:     escitalopram (LEXAPRO) 10 mg tablet, Take 1 tablet (10 mg total) by mouth daily, Disp: 30 tablet, Rfl: 0    ferrous sulfate 325 (65 Fe) mg tablet, Take 2 tablets (650 mg total) by mouth daily with breakfast, Disp: 60 tablet, Rfl: 0    lidocaine (LIDODERM) 5 %, Apply 1 patch topically daily Remove & Discard patch within 12 hours or as directed by MD, Disp: 5 patch, Rfl: 2    LORazepam (ATIVAN) 0 5 mg tablet, Take 0 5 mg by mouth daily at bedtime, Disp: , Rfl:     LORazepam (ATIVAN) 0 5 mg tablet, Take 0 5 mg by mouth every 8 (eight) hours as needed anxiety, Disp: , Rfl:     midodrine (PROAMATINE) 10 MG tablet, Take 1 tablet PO TID, hold for SBP >/= 130, Disp: 90 tablet, Rfl: 0    multivitamin (THERAGRAN) TABS, Take 1 tablet by mouth daily, Disp: , Rfl:     Status at time of discharge exam: Stable    Today's Visit: 3/16/438555:20 PM    Subjective: No complaints or concerns reported    Review of systems: As per review of present illness, all other systems reviewed and negative  Vitals:  Weight 152 4 pounds, Blood pressure 127/85, Heart rate 78, Respiration 18, Temperature 98 7, o2 97% RA    Exam: Physical Exam  Vitals and nursing note reviewed  Constitutional:       General: She is not in acute distress  Appearance: She is not ill-appearing, toxic-appearing or diaphoretic  HENT:      Head: Normocephalic and atraumatic  Nose: Nose normal       Mouth/Throat:      Mouth: Mucous membranes are moist       Pharynx: Oropharynx is clear  Eyes:      General: No scleral icterus  Right eye: No discharge  Left eye: No discharge  Cardiovascular:      Rate and Rhythm: Normal rate and regular rhythm  Pulses: Normal pulses  Heart sounds: Murmur heard  Pulmonary:      Effort: Pulmonary effort is normal  No respiratory distress  Breath sounds: Normal breath sounds  No wheezing or rales  Abdominal:      General: Bowel sounds are normal  There is no distension  Palpations: Abdomen is soft  Tenderness: There is no abdominal tenderness  There is no guarding  Musculoskeletal:         General: Tenderness (BLE) present  Cervical back: Normal range of motion and neck supple  No rigidity or tenderness  Right lower leg: Edema (chronic) present        Left lower leg: Edema (chronic) present  Comments: Moving all 4 extremities   Skin:     General: Skin is warm  Coloration: Skin is not jaundiced  Findings: Erythema (b/l LE chronic) present  No bruising  Comments: B/l LE with chronic wounds   Neurological:      General: No focal deficit present  Mental Status: She is alert and oriented to person, place, and time  Cranial Nerves: No cranial nerve deficit  Motor: Weakness (generalized) present  Gait: Gait abnormal    Psychiatric:         Mood and Affect: Mood normal      Discussion with patient/family and further instructions:  -Fall precautions  -Monitor for signs/symptoms of infection  -Medication list was reviewed    Follow-up Recommendations: Please follow-up with your primary care physician within 7-10 days of discharge to review medication changes and current status  Problem List Follow-up Recommendations:    I have spent >30 minutes with patient today in which greater than 50% of this time was spent in counseling/coordination of care regarding Diagnostic results, Prognosis, Risks and benefits of tx options, Intructions for management, Patient and family education, Importance of tx compliance, Risk factor reductions, Impressions and continue to implement fall and safety precautions, follow PT/OT recommendation at home and f/u with family provider once discharge and wound care center  Dara Soulier PCP made aware of discharge summary via epic communications  This note was completed in part utilizing [employer] direct voice recognition software  Grammatical errors, random word insertion, spelling mistakes, and incomplete sentences may be an occasional consequence of the system secondary to software limitations, ambient noise and hardware issues  At the time of dictation, efforts were made to edit, clarify and/or correct errors  Please read the chart carefully and recognize, using context, where substitutions have occurred  If you have any questions or concerns about the context, text or information contained within the body of this dictation, please contact myself, the provider, for further clarification  ROSCOE Woodward  3/16/749659:20 PM      Jayshree Woodward  3/16/2022  9:48 PM  [address]  [phone]  DISCHARGE SUMMARY  POS: STR 31  Facility: [facility]  Code Status: Full Code    NAME: Shirley Aburto  AGE: 66 y o  SEX: female  DATE OF ADMISSION: 2/22/22 DATE OF DISCHARGE: 03/17/22 DISCHARGE DISPOSITION: Home    Reason for admission: Patient was admitted from [address] [place] [address] for rehabilitation after hospitalization for  worsening lower extremity erythema and ambulatory dysfunction for 2 days  Admission Diagnoses: As mentioned above   Additional Problems:   Discharge Diagnoses: See problem list follow up recommendations below  No problem-specific Assessment & Plan notes found for this encounter  Course of stay: Patient was admitted to  [facility]   for rehabilitation following hospitalization  Patient originally presented to [address] [place] [address] on 2/16/22 due to severe sepsis  Patient presented with recurrent bilateral lower extremity cellulitis that has being worsening with erythema and ambulatory dysfunction for 2 days  Additionally was found to be tachycardic and hypotensive on arrival  Received 1 liter of normal saline and intravenous antibiotics later on transitioned to po keflex  At the [facility] she has followed by wound care receiving daily wound care  She is recommended to continue with daily wound care at home  During the resident's stay at [facility], she received skilled nursing care, PT, OT, dietitian support, social service support, and medical management  Patient is scheduled to be discharge home on 03/17/22   During examination she denies chest pain, abdominal pain, nausea, vomiting, diarrhea, constipation, fever, chills, lightheaded, dizziness, headache or urinary discomfort  PT/OT Recommendations for home discharge  Minimum assist for bed mobility, standby assist for transferring, ambulating 120 feet with a rolling walker (supervision), 6 stairs (contact guard), balance is fair +, contact guard for upper body dressing, minimum assist for lower body dressing minimum assist for toiletting  Returning home with PT, OT, VNA services  --LOS 24 days  Labs and testing performed during stay:      Discharge Medications: See discharge medication list which was reviewed in [facility] and compared to facility orders for accuracy  Status at time of discharge exam: Stable    Today's Visit: 3/16/73844:22 PM    Subjective:    Review of systems:     Vitals:    Exam: Physical Exam    Discussion with patient/family and further instructions:  -Fall precautions  -Bleeding precautions  -Monitor for signs/symptoms of infection  -Medication list was reviewed    Follow-up Recommendations: Please follow-up with your primary care physician within 7-10 days of discharge to review medication changes and current status  Problem List Follow-up Recommendations:    I have spent >30 minutes with patient today in which greater than 50% of this time was spent in counseling/coordination of care regarding {AMB Counseling Topics:[id number]}  PCP made aware of discharge summary via epic communications  This note was completed in part utilizing [employer] direct voice recognition software  Grammatical errors, random word insertion, spelling mistakes, and incomplete sentences may be an occasional consequence of the system secondary to software limitations, ambient noise and hardware issues  At the time of dictation, efforts were made to edit, clarify and/or correct errors  Please read the chart carefully and recognize, using context, where substitutions have occurred    If you have any questions or concerns about the context, text or information contained within the body of this dictation, please contact myself, the provider, for further clarification      ROSCOE Ryan  3/16/86954:22 PM

## 2022-03-16 NOTE — LETTER
March 16, 2022     Leyla Haywood MD  [address] [place] [address]    Patient: Kalina Sim   YOB: 1950   Date of Visit: 3/16/2022       Dear Dr Mimi Rios: Thank you for referring Ramana Canseco to me for evaluation  Below are my notes for this consultation  If you have questions, please do not hesitate to call me  I look forward to following your patient along with you  Sincerely,        ROSCOE Miller        CC: No Recipients  Melyssa Noble, Jayshree Casia   3/16/2022 10:21 PM  Incomplete  [facility]  [address]  [phone]  DISCHARGE SUMMARY  POS: STR 31  Facility: [facility]  Code Status: Full Code    NAME: Kalina Sim  AGE: 66 y o  SEX: female  DATE OF ADMISSION: 2/22/22 DATE OF DISCHARGE: 03/17/22 DISCHARGE DISPOSITION: Home    Reason for admission: Patient was admitted from [address] [place] [address] for rehabilitation after hospitalization for  worsening lower extremity erythema and ambulatory dysfunction for 2 days  Admission Diagnoses: As mentioned above   Additional Problems:   Discharge Diagnoses: See problem list follow up recommendations below      Chronic back pain  Denies pain today  Continue with daily lidocaine patch   Denies b/ LE numbness and tingling  Continue pt/ot for strength and balance training  oob for meals as tolerated   Continue 24/7 supportive care at [facility] with ADLs  F/u with family provider once discharge    Ambulatory dysfunction  Continue with PT/OT for strengthening and balance training  Continue to implement Fall and safety precautions  Continue with 24/7 supportive care at [facility] with ADLs  Encourage a/dequate po hydration and nutrition  PT/OT following  F/u with primary care provider once discharge    Lymphedema  Chronic lymphedema  Wound following continue with recommendations  Might benefit from a lymphedema pump and compliance with therapy  Continue to elevate BLE and avoid tight socks   Remains afebrile, non toxic appearance and VSS  F/u with wound care as an outpatient  Continue to f/u with wound care recommendations and daily wound care  Continue to use lymphedema pump    Vitamin B12 deficiency  Spoke with the office of her PCP and reports to go ahead and give Vitamin B12 injection (IM), and should be given every 30 days  Continue with supplementation  F/U with PCP for next dose post discharge  Will continue to monitor   F/U with primary care provider once discharge    Vitamin D deficiency  Continue with vitamin D supplementation  F/u with primary care for management    Anemia  Most recent hemoglobin 9 5 ---> 9 0, hematrocrit 29 3 -- > 27 9, at baseline  per records review baseline is between 8 1- 10 1  Continue ferrous sulfate 325 mg 2 tabs daily and MVI once a day  No overt bleeding   F/u with primary care provider for management    Depression  Continue with buspar 15 mg bid, escitalopram 10 mg daily, bupropion HCl ER 24 hour 300 mg daily and lorazepam 0 5 at bedtime and PRN dose of 0 5 mg q 8 hours   F/U with primary care provider for management    Recurrent cellulitis of lower extremity  Patient has a h/o recurrent cellulitis on BLE since 2003 with bilateral venous stasis +hyperkeratosis and pappilomatosis  Negative for lymprorrhea per wound care nurse notes  BLE noted dry crust with black and dry blood, scabs and dry blood on BLE  On examination erythema improving since patient is continuing with elevation   Wound following continue with recommendations  Remains afebrile, non toxic appearance and VSS  F/U with wound care center once discharge  Continue to follow up with wound care recommendations and daily wound care  F/U with primary care provider once discharge    Depression with anxiety  Mood stable no behaviors reported  Continue with current medication regimen   Continue with buspar 15 mg bid, escitalopram 10 mg daily, bupropion HCl ER 24 hour 300 mg daily and lorazepam 0 5 at bedtime and PRN dose of 0 5 mg q 8 hours   F/U with primary care provider for management    Severe aortic stenosis  Recent ECHO showed the aortic valve is trileaflet  Leaflets are severerly thickening  The leaflest are severely calcified with reduced mobility  + for mild to moderate regurgitation  There is critical stenosis  Denies shortness of breath, palpitations or chest pain during examination  F/u with cardiology and cardiac surgery opted for outpatient workup upon discharge      Idiopathic hypotension  Blood pressure log reviewed and stable, BP today 127/85  SBP's have been trending between 117s to 155s (3/11 to 3/16)  Continue with midodrine 10 mg tid with hold parameters for systolic blood pressures greater than 130  Avoid hypotension and tight control due to fall risk  Continue to monitor and adjust meds as appropriate  Enctourage low sodium diet   Ensure adequate po hydration   F/u with cardiology upon discharge     Course of stay: Patient was admitted to  [facility]   for rehabilitation following hospitalization  Patient originally presented to [address] [place] [address] on 2/16/22 due to severe sepsis  Patient presented with recurrent bilateral lower extremity cellulitis that has being worsening with erythema and ambulatory dysfunction for 2 days  Additionally was found to be tachycardic and hypotensive on arrival  Received 1 liter of normal saline and intravenous antibiotics later on transitioned to po keflex  At the [facility] she has followed by wound care receiving daily wound care  She is recommended to continue with daily wound care at home  During the resident's stay at [facility], she received skilled nursing care, PT, OT, dietitian support, social service support, and medical management  Patient is scheduled to be discharge home on 03/17/22  During examination she denies chest pain, abdominal pain, nausea, vomiting, diarrhea, constipation, fever, chills, lightheaded, dizziness, headache or urinary discomfort       PT/OT Recommendations for home discharge  Minimum assist for bed mobility, standby assist for transferring, ambulating 120 feet with a rolling walker (supervision), 6 stairs (contact guard), balance is fair +, contact guard for upper body dressing, minimum assist for lower body dressing minimum assist for toiletting  Returning home with PT, OT, VNA services  --LOS 24 days  Labs and testing performed during stay:  CBC and BMP reviewed on 3/8/22- hemoglobin 9 5, hematocrit 29 3, WBC 6 0, platelets 057, blood glucose 73, BUN 26, creatinine 1 0, sodium 140, potassium 4 5, calcium 9 4, EGFR 59    Discharge Medications: See discharge medication list which was reviewed in [facility] and compared to facility orders for accuracy      Current Outpatient Medications:     acetaminophen (TYLENOL) 325 mg tablet, Take 2 tablets (650 mg total) by mouth every 6 (six) hours as needed for mild pain or moderate pain, Disp: 15 tablet, Rfl: 0    ammonium lactate (LAC-HYDRIN) 12 % lotion, Apply 1 application topically 2 (two) times a day B/l LE, Disp: , Rfl:     buPROPion (WELLBUTRIN XL) 300 mg 24 hr tablet, Take 1 tablet (300 mg total) by mouth daily, Disp: 30 tablet, Rfl: 0    busPIRone (BUSPAR) 15 mg tablet, Take 1 tablet (15 mg total) by mouth 2 (two) times a day, Disp: 60 tablet, Rfl: 0    cholecalciferol (VITAMIN D3) 1,000 units tablet, Take 1,000 Units by mouth daily , Disp: , Rfl:     cyclobenzaprine (FLEXERIL) 5 mg tablet, Take 1 tablet (5 mg total) by mouth 2 (two) times a day as needed for muscle spasms, Disp: 10 tablet, Rfl: 0    docusate sodium (COLACE) 100 mg capsule, Take 100 mg by mouth 2 (two) times a day, Disp: , Rfl:     escitalopram (LEXAPRO) 10 mg tablet, Take 1 tablet (10 mg total) by mouth daily, Disp: 30 tablet, Rfl: 0    ferrous sulfate 325 (65 Fe) mg tablet, Take 2 tablets (650 mg total) by mouth daily with breakfast, Disp: 60 tablet, Rfl: 0    lidocaine (LIDODERM) 5 %, Apply 1 patch topically daily Remove & Discard patch within 12 hours or as directed by MD, Disp: 5 patch, Rfl: 2    LORazepam (ATIVAN) 0 5 mg tablet, Take 0 5 mg by mouth daily at bedtime, Disp: , Rfl:     LORazepam (ATIVAN) 0 5 mg tablet, Take 0 5 mg by mouth every 8 (eight) hours as needed anxiety, Disp: , Rfl:     midodrine (PROAMATINE) 10 MG tablet, Take 1 tablet PO TID, hold for SBP >/= 130, Disp: 90 tablet, Rfl: 0    multivitamin (THERAGRAN) TABS, Take 1 tablet by mouth daily, Disp: , Rfl:     Status at time of discharge exam: Stable    Today's Visit: 3/16/878506:20 PM    Subjective: No complaints or concerns reported    Review of systems: As per review of present illness, all other systems reviewed and negative  Vitals:  Weight 152 4 pounds, Blood pressure 127/85, Heart rate 78, Respiration 18, Temperature 98 7, o2 97% RA    Exam: Physical Exam  Vitals and nursing note reviewed  Constitutional:       General: She is not in acute distress  Appearance: She is not ill-appearing, toxic-appearing or diaphoretic  HENT:      Head: Normocephalic and atraumatic  Nose: Nose normal       Mouth/Throat:      Mouth: Mucous membranes are moist       Pharynx: Oropharynx is clear  Eyes:      General: No scleral icterus  Right eye: No discharge  Left eye: No discharge  Cardiovascular:      Rate and Rhythm: Normal rate and regular rhythm  Pulses: Normal pulses  Heart sounds: Murmur heard  Pulmonary:      Effort: Pulmonary effort is normal  No respiratory distress  Breath sounds: Normal breath sounds  No wheezing or rales  Abdominal:      General: Bowel sounds are normal  There is no distension  Palpations: Abdomen is soft  Tenderness: There is no abdominal tenderness  There is no guarding  Musculoskeletal:         General: Tenderness (BLE) present  Cervical back: Normal range of motion and neck supple  No rigidity or tenderness  Right lower leg: Edema (chronic) present        Left lower leg: Edema (chronic) present  Comments: Moving all 4 extremities   Skin:     General: Skin is warm  Coloration: Skin is not jaundiced  Findings: Erythema (b/l LE chronic) present  No bruising  Comments: B/l LE with chronic wounds   Neurological:      General: No focal deficit present  Mental Status: She is alert and oriented to person, place, and time  Cranial Nerves: No cranial nerve deficit  Motor: Weakness (generalized) present  Gait: Gait abnormal    Psychiatric:         Mood and Affect: Mood normal      Discussion with patient/family and further instructions:  -Fall precautions  -Monitor for signs/symptoms of infection  -Medication list was reviewed    Follow-up Recommendations: Please follow-up with your primary care physician within 7-10 days of discharge to review medication changes and current status  Problem List Follow-up Recommendations:    I have spent >30 minutes with patient today in which greater than 50% of this time was spent in counseling/coordination of care regarding Diagnostic results, Prognosis, Risks and benefits of tx options, Intructions for management, Patient and family education, Importance of tx compliance, Risk factor reductions, Impressions and continue to implement fall and safety precautions, follow PT/OT recommendation at home and f/u with family provider once discharge and wound care center  Juwan Hercules PCP made aware of discharge summary via epic communications  This note was completed in part utilizing [employer] direct voice recognition software  Grammatical errors, random word insertion, spelling mistakes, and incomplete sentences may be an occasional consequence of the system secondary to software limitations, ambient noise and hardware issues  At the time of dictation, efforts were made to edit, clarify and/or correct errors  Please read the chart carefully and recognize, using context, where substitutions have occurred  If you have any questions or concerns about the context, text or information contained within the body of this dictation, please contact myself, the provider, for further clarification  ROSCOE Terry  3/16/020814:20 PM      Jayshree Terry  3/16/2022  9:48 PM  [facility]  [address]  [phone]  DISCHARGE SUMMARY  POS: STR 31  Facility: [facility]  Code Status: Full Code    NAME: Lit Murillo  AGE: 66 y o  SEX: female  DATE OF ADMISSION: 2/22/22 DATE OF DISCHARGE: 03/17/22 DISCHARGE DISPOSITION: Home    Reason for admission: Patient was admitted from [address] [place] [address] for rehabilitation after hospitalization for  worsening lower extremity erythema and ambulatory dysfunction for 2 days  Admission Diagnoses: As mentioned above   Additional Problems:   Discharge Diagnoses: See problem list follow up recommendations below  No problem-specific Assessment & Plan notes found for this encounter  Course of stay: Patient was admitted to  [facility]   for rehabilitation following hospitalization  Patient originally presented to [address] [place] [address] on 2/16/22 due to severe sepsis  Patient presented with recurrent bilateral lower extremity cellulitis that has being worsening with erythema and ambulatory dysfunction for 2 days  Additionally was found to be tachycardic and hypotensive on arrival  Received 1 liter of normal saline and intravenous antibiotics later on transitioned to po keflex  At the [facility] she has followed by wound care receiving daily wound care  She is recommended to continue with daily wound care at home  During the resident's stay at [facility], she received skilled nursing care, PT, OT, dietitian support, social service support, and medical management  Patient is scheduled to be discharge home on 03/17/22   During examination she denies chest pain, abdominal pain, nausea, vomiting, diarrhea, constipation, fever, chills, lightheaded, dizziness, headache or urinary discomfort  PT/OT Recommendations for home discharge  Minimum assist for bed mobility, standby assist for transferring, ambulating 120 feet with a rolling walker (supervision), 6 stairs (contact guard), balance is fair +, contact guard for upper body dressing, minimum assist for lower body dressing minimum assist for toiletting  Returning home with PT, OT, VNA services  --LOS 24 days  Labs and testing performed during stay:      Discharge Medications: See discharge medication list which was reviewed in [facility] and compared to facility orders for accuracy  Status at time of discharge exam: Stable    Today's Visit: 3/16/44290:22 PM    Subjective:    Review of systems:     Vitals:    Exam: Physical Exam    Discussion with patient/family and further instructions:  -Fall precautions  -Bleeding precautions  -Monitor for signs/symptoms of infection  -Medication list was reviewed    Follow-up Recommendations: Please follow-up with your primary care physician within 7-10 days of discharge to review medication changes and current status  Problem List Follow-up Recommendations:    I have spent >30 minutes with patient today in which greater than 50% of this time was spent in counseling/coordination of care regarding {AMB Counseling Topics:[id number]}  PCP made aware of discharge summary via epic communications  This note was completed in part utilizing [employer] direct voice recognition software  Grammatical errors, random word insertion, spelling mistakes, and incomplete sentences may be an occasional consequence of the system secondary to software limitations, ambient noise and hardware issues  At the time of dictation, efforts were made to edit, clarify and/or correct errors  Please read the chart carefully and recognize, using context, where substitutions have occurred    If you have any questions or concerns about the context, text or information contained within the body of this dictation, please contact myself, the provider, for further clarification      ROSCOE Mae  3/16/70482:22 PM

## 2022-03-16 NOTE — PROGRESS NOTES
[facility]  [address]  [phone]  DISCHARGE SUMMARY  POS: STR 31  Facility: [facility]  Code Status: Full Code    NAME: Ina Flores  AGE: 66 y o  SEX: female  DATE OF ADMISSION: 2/22/22 DATE OF DISCHARGE: 03/17/22 DISCHARGE DISPOSITION: Home    Reason for admission: Patient was admitted from [address] [place] [address] for rehabilitation after hospitalization for  worsening lower extremity erythema and ambulatory dysfunction for 2 days  Admission Diagnoses: As mentioned above   Additional Problems:   Discharge Diagnoses: See problem list follow up recommendations below      Chronic back pain  Denies pain today  Continue with daily lidocaine patch   Denies b/ LE numbness and tingling  Continue pt/ot for strength and balance training  oob for meals as tolerated   Continue 24/7 supportive care at [facility] with ADLs  F/u with family provider once discharge    Ambulatory dysfunction  Continue with PT/OT for strengthening and balance training  Continue to implement Fall and safety precautions  Continue with 24/7 supportive care at [facility] with ADLs  Encourage a/dequate po hydration and nutrition  PT/OT following  F/u with primary care provider once discharge    Lymphedema  Chronic lymphedema  Wound following continue with recommendations  Might benefit from a lymphedema pump and compliance with therapy  Continue to elevate BLE and avoid tight socks   Remains afebrile, non toxic appearance and VSS  F/u with wound care as an outpatient  Continue to f/u with wound care recommendations and daily wound care  Continue to use lymphedema pump    Vitamin B12 deficiency  Spoke with the office of her PCP and reports to go ahead and give Vitamin B12 injection (IM), and should be given every 30 days  Continue with supplementation  F/U with PCP for next dose post discharge  Will continue to monitor   F/U with primary care provider once discharge    Vitamin D deficiency  Continue with vitamin D supplementation  F/u with primary care for management    Anemia  Most recent hemoglobin 9 5 ---> 9 0, hematrocrit 29 3 -- > 27 9, at baseline  per records review baseline is between 8 1- 10 1  Continue ferrous sulfate 325 mg 2 tabs daily and MVI once a day  No overt bleeding   F/u with primary care provider for management    Depression  Continue with buspar 15 mg bid, escitalopram 10 mg daily, bupropion HCl ER 24 hour 300 mg daily and lorazepam 0 5 at bedtime and PRN dose of 0 5 mg q 8 hours   F/U with primary care provider for management    Recurrent cellulitis of lower extremity  Patient has a h/o recurrent cellulitis on BLE since 2003 with bilateral venous stasis +hyperkeratosis and pappilomatosis  Negative for lymprorrhea per wound care nurse notes  BLE noted dry crust with black and dry blood, scabs and dry blood on BLE  On examination erythema improving since patient is continuing with elevation   Wound following continue with recommendations  Remains afebrile, non toxic appearance and VSS  F/U with wound care center once discharge  Continue to follow up with wound care recommendations and daily wound care  F/U with primary care provider once discharge    Depression with anxiety  Mood stable no behaviors reported  Continue with current medication regimen   Continue with buspar 15 mg bid, escitalopram 10 mg daily, bupropion HCl ER 24 hour 300 mg daily and lorazepam 0 5 at bedtime and PRN dose of 0 5 mg q 8 hours   F/U with primary care provider for management    Severe aortic stenosis  Recent ECHO showed the aortic valve is trileaflet  Leaflets are severerly thickening  The leaflest are severely calcified with reduced mobility  + for mild to moderate regurgitation   There is critical stenosis  Denies shortness of breath, palpitations or chest pain during examination  F/u with cardiology and cardiac surgery opted for outpatient workup upon discharge      Idiopathic hypotension  Blood pressure log reviewed and stable, BP today 127/85  SBP's have been trending between 117s to 155s (3/11 to 3/16)  Continue with midodrine 10 mg tid with hold parameters for systolic blood pressures greater than 130  Avoid hypotension and tight control due to fall risk  Continue to monitor and adjust meds as appropriate  Enctourage low sodium diet   Ensure adequate po hydration   F/u with cardiology upon discharge     Course of stay: Patient was admitted to  [facility]   for rehabilitation following hospitalization  Patient originally presented to [address] [place] [address] on 2/16/22 due to severe sepsis  Patient presented with recurrent bilateral lower extremity cellulitis that has being worsening with erythema and ambulatory dysfunction for 2 days  Additionally was found to be tachycardic and hypotensive on arrival  Received 1 liter of normal saline and intravenous antibiotics later on transitioned to po keflex  At the [facility] she has followed by wound care receiving daily wound care  She is recommended to continue with daily wound care at home  During the resident's stay at [facility], she received skilled nursing care, PT, OT, dietitian support, social service support, and medical management  Patient is scheduled to be discharge home on 03/17/22  During examination she denies chest pain, abdominal pain, nausea, vomiting, diarrhea, constipation, fever, chills, lightheaded, dizziness, headache or urinary discomfort  PT/OT Recommendations for home discharge  Minimum assist for bed mobility, standby assist for transferring, ambulating 120 feet with a rolling walker (supervision), 6 stairs (contact guard), balance is fair +, contact guard for upper body dressing, minimum assist for lower body dressing minimum assist for toiletting  Returning home with PT, OT, VNA services  --LOS 24 days       Labs and testing performed during stay:  CBC and BMP reviewed on 3/8/22- hemoglobin 9 5, hematocrit 29 3, WBC 6 0, platelets 624, blood glucose 73, BUN 26, creatinine 1 0, sodium 140, potassium 4 5, calcium 9 4, EGFR 59    Discharge Medications: See discharge medication list which was reviewed in epic and compared to facility orders for accuracy      Current Outpatient Medications:     acetaminophen (TYLENOL) 325 mg tablet, Take 2 tablets (650 mg total) by mouth every 6 (six) hours as needed for mild pain or moderate pain, Disp: 15 tablet, Rfl: 0    ammonium lactate (LAC-HYDRIN) 12 % lotion, Apply 1 application topically 2 (two) times a day B/l LE, Disp: , Rfl:     buPROPion (WELLBUTRIN XL) 300 mg 24 hr tablet, Take 1 tablet (300 mg total) by mouth daily, Disp: 30 tablet, Rfl: 0    busPIRone (BUSPAR) 15 mg tablet, Take 1 tablet (15 mg total) by mouth 2 (two) times a day, Disp: 60 tablet, Rfl: 0    cholecalciferol (VITAMIN D3) 1,000 units tablet, Take 1,000 Units by mouth daily , Disp: , Rfl:     cyclobenzaprine (FLEXERIL) 5 mg tablet, Take 1 tablet (5 mg total) by mouth 2 (two) times a day as needed for muscle spasms, Disp: 10 tablet, Rfl: 0    docusate sodium (COLACE) 100 mg capsule, Take 100 mg by mouth 2 (two) times a day, Disp: , Rfl:     escitalopram (LEXAPRO) 10 mg tablet, Take 1 tablet (10 mg total) by mouth daily, Disp: 30 tablet, Rfl: 0    ferrous sulfate 325 (65 Fe) mg tablet, Take 2 tablets (650 mg total) by mouth daily with breakfast, Disp: 60 tablet, Rfl: 0    lidocaine (LIDODERM) 5 %, Apply 1 patch topically daily Remove & Discard patch within 12 hours or as directed by MD, Disp: 5 patch, Rfl: 2    LORazepam (ATIVAN) 0 5 mg tablet, Take 0 5 mg by mouth daily at bedtime, Disp: , Rfl:     LORazepam (ATIVAN) 0 5 mg tablet, Take 0 5 mg by mouth every 8 (eight) hours as needed anxiety, Disp: , Rfl:     midodrine (PROAMATINE) 10 MG tablet, Take 1 tablet PO TID, hold for SBP >/= 130, Disp: 90 tablet, Rfl: 0    multivitamin (THERAGRAN) TABS, Take 1 tablet by mouth daily, Disp: , Rfl:     Status at time of discharge exam: Stable    Today's Visit: 3/16/325315:20 PM    Subjective: No complaints or concerns reported    Review of systems: As per review of present illness, all other systems reviewed and negative  Vitals:  Weight 152 4 pounds, Blood pressure 127/85, Heart rate 78, Respiration 18, Temperature 98 7, o2 97% RA    Exam: Physical Exam  Vitals and nursing note reviewed  Constitutional:       General: She is not in acute distress  Appearance: She is not ill-appearing, toxic-appearing or diaphoretic  HENT:      Head: Normocephalic and atraumatic  Nose: Nose normal       Mouth/Throat:      Mouth: Mucous membranes are moist       Pharynx: Oropharynx is clear  Eyes:      General: No scleral icterus  Right eye: No discharge  Left eye: No discharge  Cardiovascular:      Rate and Rhythm: Normal rate and regular rhythm  Pulses: Normal pulses  Heart sounds: Murmur heard  Pulmonary:      Effort: Pulmonary effort is normal  No respiratory distress  Breath sounds: Normal breath sounds  No wheezing or rales  Abdominal:      General: Bowel sounds are normal  There is no distension  Palpations: Abdomen is soft  Tenderness: There is no abdominal tenderness  There is no guarding  Musculoskeletal:         General: Tenderness (BLE) present  Cervical back: Normal range of motion and neck supple  No rigidity or tenderness  Right lower leg: Edema (chronic) present  Left lower leg: Edema (chronic) present  Comments: Moving all 4 extremities   Skin:     General: Skin is warm  Coloration: Skin is not jaundiced  Findings: Erythema (b/l LE chronic) present  No bruising  Comments: B/l LE with chronic wounds   Neurological:      General: No focal deficit present  Mental Status: She is alert and oriented to person, place, and time  Cranial Nerves: No cranial nerve deficit  Motor: Weakness (generalized) present        Gait: Gait abnormal    Psychiatric:         Mood and Affect: Mood normal      Discussion with patient/family and further instructions:  -Fall precautions  -Monitor for signs/symptoms of infection  -Medication list was reviewed    Follow-up Recommendations: Please follow-up with your primary care physician within 7-10 days of discharge to review medication changes and current status  Problem List Follow-up Recommendations:    I have spent >30 minutes with patient today in which greater than 50% of this time was spent in counseling/coordination of care regarding Diagnostic results, Prognosis, Risks and benefits of tx options, Intructions for management, Patient and family education, Importance of tx compliance, Risk factor reductions, Impressions and continue to implement fall and safety precautions, follow PT/OT recommendation at home and f/u with family provider once discharge and wound care center  Charles Manifold PCP made aware of discharge summary via epic communications  This note was completed in part utilizing m-modal fluency direct voice recognition software  Grammatical errors, random word insertion, spelling mistakes, and incomplete sentences may be an occasional consequence of the system secondary to software limitations, ambient noise and hardware issues  At the time of dictation, efforts were made to edit, clarify and/or correct errors  Please read the chart carefully and recognize, using context, where substitutions have occurred  If you have any questions or concerns about the context, text or information contained within the body of this dictation, please contact myself, the provider, for further clarification      ROSCOE Russell  3/16/932673:20 PM

## 2022-03-16 NOTE — LETTER
March 16, 2022     Darryl Fraser MD  [address] [place] [address]    Patient: Tracey Cifuentes   YOB: 1950   Date of Visit: 3/16/2022       Dear Dr Leona Nina: Thank you for referring Arvid Case to me for evaluation  Below are my notes for this consultation  If you have questions, please do not hesitate to call me  I look forward to following your patient along with you  Sincerely,        ROSCOE Jewell        CC: No Recipients  Svitlana Teixeira, [address]  3/16/2022 10:29 PM  Incomplete  [facility]  [address]  [phone]  DISCHARGE SUMMARY  POS: STR 31  Facility: [facility]  Code Status: Full Code    NAME: Tracey Cifuentes  AGE: 66 y o  SEX: female  DATE OF ADMISSION: 2/22/22 DATE OF DISCHARGE: 03/17/22 DISCHARGE DISPOSITION: Home    Reason for admission: Patient was admitted from [address] [place] [address] for rehabilitation after hospitalization for  worsening lower extremity erythema and ambulatory dysfunction for 2 days  Admission Diagnoses: As mentioned above   Additional Problems:   Discharge Diagnoses: See problem list follow up recommendations below      Chronic back pain  Denies pain today  Continue with daily lidocaine patch   Denies b/ LE numbness and tingling  Continue pt/ot for strength and balance training  oob for meals as tolerated   Continue 24/7 supportive care at [address] [place] [address] with ADLs  F/u with family provider once discharge    Ambulatory dysfunction  Continue with PT/OT for strengthening and balance training  Continue to implement Fall and safety precautions  Continue with 24/7 supportive care at [address] [place] [address] with ADLs  Encourage a/dequate po hydration and nutrition  PT/OT following  F/u with primary care provider once discharge    Lymphedema  Chronic lymphedema  Wound following continue with recommendations  Might benefit from a lymphedema pump and compliance with therapy  Continue to elevate BLE and avoid tight socks   Remains afebrile, non toxic appearance and VSS  F/u with wound care as an outpatient  Continue to f/u with wound care recommendations and daily wound care  Continue to use lymphedema pump    Vitamin B12 deficiency  Spoke with the office of her PCP and reports to go ahead and give Vitamin B12 injection (IM), and should be given every 30 days  Continue with supplementation  F/U with PCP for next dose post discharge  Will continue to monitor   F/U with primary care provider once discharge    Vitamin D deficiency  Continue with vitamin D supplementation  F/u with primary care for management    Anemia  Most recent hemoglobin 9 5 ---> 9 0, hematrocrit 29 3 -- > 27 9, at baseline  per records review baseline is between 8 1- 10 1  Continue ferrous sulfate 325 mg 2 tabs daily and MVI once a day  No overt bleeding   F/u with primary care provider for management    Depression  Continue with buspar 15 mg bid, escitalopram 10 mg daily, bupropion HCl ER 24 hour 300 mg daily and lorazepam 0 5 at bedtime and PRN dose of 0 5 mg q 8 hours   F/U with primary care provider for management    Recurrent cellulitis of lower extremity  Patient has a h/o recurrent cellulitis on BLE since 2003 with bilateral venous stasis +hyperkeratosis and pappilomatosis  Negative for lymprorrhea per wound care nurse notes  BLE noted dry crust with black and dry blood, scabs and dry blood on BLE  On examination erythema improving since patient is continuing with elevation   Wound following continue with recommendations  Remains afebrile, non toxic appearance and VSS  F/U with wound care center once discharge  Continue to follow up with wound care recommendations and daily wound care  F/U with primary care provider once discharge    Depression with anxiety  Mood stable no behaviors reported  Continue with current medication regimen   Continue with buspar 15 mg bid, escitalopram 10 mg daily, bupropion HCl ER 24 hour 300 mg daily and lorazepam 0 5 at bedtime and PRN dose of 0 5 mg q 8 hours   F/U with primary care provider for management    Severe aortic stenosis  Recent ECHO showed the aortic valve is trileaflet  Leaflets are severerly thickening  The leaflest are severely calcified with reduced mobility  + for mild to moderate regurgitation  There is critical stenosis  Denies shortness of breath, palpitations or chest pain during examination  F/u with cardiology and cardiac surgery opted for outpatient workup upon discharge      Idiopathic hypotension  Blood pressure log reviewed and stable, BP today 127/85  SBP's have been trending between 117s to 155s (3/11 to 3/16)  Continue with midodrine 10 mg tid with hold parameters for systolic blood pressures greater than 130  Avoid hypotension and tight control due to fall risk  Continue to monitor and adjust meds as appropriate  Enctourage low sodium diet   Ensure adequate po hydration   F/u with cardiology upon discharge     Course of stay: Patient was admitted to  [facility]   for rehabilitation following hospitalization  Patient originally presented to [address] [place] [address] on 2/16/22 due to severe sepsis  Patient presented with recurrent bilateral lower extremity cellulitis that has being worsening with erythema and ambulatory dysfunction for 2 days  Additionally was found to be tachycardic and hypotensive on arrival  Received 1 liter of normal saline and intravenous antibiotics later on transitioned to po keflex  At the [facility] she has followed by wound care receiving daily wound care  She is recommended to continue with daily wound care at home  During the resident's stay at [facility], she received skilled nursing care, PT, OT, dietitian support, social service support, and medical management  Patient is scheduled to be discharge home on 03/17/22  During examination she denies chest pain, abdominal pain, nausea, vomiting, diarrhea, constipation, fever, chills, lightheaded, dizziness, headache or urinary discomfort       PT/OT Recommendations for home discharge  Minimum assist for bed mobility, standby assist for transferring, ambulating 120 feet with a rolling walker (supervision), 6 stairs (contact guard), balance is fair +, contact guard for upper body dressing, minimum assist for lower body dressing minimum assist for toiletting  Returning home with PT, OT, VNA services  --LOS 24 days  Labs and testing performed during stay:  CBC and BMP reviewed on 3/8/22- hemoglobin 9 5, hematocrit 29 3, WBC 6 0, platelets 394, blood glucose 73, BUN 26, creatinine 1 0, sodium 140, potassium 4 5, calcium 9 4, EGFR 59    Discharge Medications: See discharge medication list which was reviewed in epic and compared to facility orders for accuracy      Current Outpatient Medications:     acetaminophen (TYLENOL) 325 mg tablet, Take 2 tablets (650 mg total) by mouth every 6 (six) hours as needed for mild pain or moderate pain, Disp: 15 tablet, Rfl: 0    ammonium lactate (LAC-HYDRIN) 12 % lotion, Apply 1 application topically 2 (two) times a day B/l LE, Disp: , Rfl:     buPROPion (WELLBUTRIN XL) 300 mg 24 hr tablet, Take 1 tablet (300 mg total) by mouth daily, Disp: 30 tablet, Rfl: 0    busPIRone (BUSPAR) 15 mg tablet, Take 1 tablet (15 mg total) by mouth 2 (two) times a day, Disp: 60 tablet, Rfl: 0    cholecalciferol (VITAMIN D3) 1,000 units tablet, Take 1,000 Units by mouth daily , Disp: , Rfl:     cyclobenzaprine (FLEXERIL) 5 mg tablet, Take 1 tablet (5 mg total) by mouth 2 (two) times a day as needed for muscle spasms, Disp: 10 tablet, Rfl: 0    docusate sodium (COLACE) 100 mg capsule, Take 100 mg by mouth 2 (two) times a day, Disp: , Rfl:     escitalopram (LEXAPRO) 10 mg tablet, Take 1 tablet (10 mg total) by mouth daily, Disp: 30 tablet, Rfl: 0    ferrous sulfate 325 (65 Fe) mg tablet, Take 2 tablets (650 mg total) by mouth daily with breakfast, Disp: 60 tablet, Rfl: 0    lidocaine (LIDODERM) 5 %, Apply 1 patch topically daily Remove & Discard patch within 12 hours or as directed by MD, Disp: 5 patch, Rfl: 2    LORazepam (ATIVAN) 0 5 mg tablet, Take 0 5 mg by mouth daily at bedtime, Disp: , Rfl:     LORazepam (ATIVAN) 0 5 mg tablet, Take 0 5 mg by mouth every 8 (eight) hours as needed anxiety, Disp: , Rfl:     midodrine (PROAMATINE) 10 MG tablet, Take 1 tablet PO TID, hold for SBP >/= 130, Disp: 90 tablet, Rfl: 0    multivitamin (THERAGRAN) TABS, Take 1 tablet by mouth daily, Disp: , Rfl:     Status at time of discharge exam: Stable    Today's Visit: 3/16/362907:20 PM    Subjective: No complaints or concerns reported    Review of systems: As per review of present illness, all other systems reviewed and negative  Vitals:  Weight 152 4 pounds, Blood pressure 127/85, Heart rate 78, Respiration 18, Temperature 98 7, o2 97% RA    Exam: Physical Exam  Vitals and nursing note reviewed  Constitutional:       General: She is not in acute distress  Appearance: She is not ill-appearing, toxic-appearing or diaphoretic  HENT:      Head: Normocephalic and atraumatic  Nose: Nose normal       Mouth/Throat:      Mouth: Mucous membranes are moist       Pharynx: Oropharynx is clear  Eyes:      General: No scleral icterus  Right eye: No discharge  Left eye: No discharge  Cardiovascular:      Rate and Rhythm: Normal rate and regular rhythm  Pulses: Normal pulses  Heart sounds: Murmur heard  Pulmonary:      Effort: Pulmonary effort is normal  No respiratory distress  Breath sounds: Normal breath sounds  No wheezing or rales  Abdominal:      General: Bowel sounds are normal  There is no distension  Palpations: Abdomen is soft  Tenderness: There is no abdominal tenderness  There is no guarding  Musculoskeletal:         General: Tenderness (BLE) present  Cervical back: Normal range of motion and neck supple  No rigidity or tenderness  Right lower leg: Edema (chronic) present        Left lower leg: Edema (chronic) present  Comments: Moving all 4 extremities   Skin:     General: Skin is warm  Coloration: Skin is not jaundiced  Findings: Erythema (b/l LE chronic) present  No bruising  Comments: B/l LE with chronic wounds   Neurological:      General: No focal deficit present  Mental Status: She is alert and oriented to person, place, and time  Cranial Nerves: No cranial nerve deficit  Motor: Weakness (generalized) present  Gait: Gait abnormal    Psychiatric:         Mood and Affect: Mood normal      Discussion with patient/family and further instructions:  -Fall precautions  -Monitor for signs/symptoms of infection  -Medication list was reviewed    Follow-up Recommendations: Please follow-up with your primary care physician within 7-10 days of discharge to review medication changes and current status  Problem List Follow-up Recommendations:    I have spent >30 minutes with patient today in which greater than 50% of this time was spent in counseling/coordination of care regarding Diagnostic results, Prognosis, Risks and benefits of tx options, Intructions for management, Patient and family education, Importance of tx compliance, Risk factor reductions, Impressions and continue to implement fall and safety precautions, follow PT/OT recommendation at home and f/u with family provider once discharge and wound care center  Sonia Gastelum PCP made aware of discharge summary via epic communications  This note was completed in part utilizing m-So[employer] direct voice recognition software  Grammatical errors, random word insertion, spelling mistakes, and incomplete sentences may be an occasional consequence of the system secondary to software limitations, ambient noise and hardware issues  At the time of dictation, efforts were made to edit, clarify and/or correct errors  Please read the chart carefully and recognize, using context, where substitutions have occurred  If you have any questions or concerns about the context, text or information contained within the body of this dictation, please contact myself, the provider, for further clarification      ROSCOE Meza  3/16/387651:20 PM

## 2022-03-17 NOTE — ASSESSMENT & PLAN NOTE
Spoke with the office of her PCP and reports to go ahead and give Vitamin B12 injection (IM), and should be given every 30 days  Continue with supplementation  F/U with PCP for next dose post discharge  Will continue to monitor   F/U with primary care provider once discharge

## 2022-03-17 NOTE — ASSESSMENT & PLAN NOTE
Continue with PT/OT for strengthening and balance training  Continue to implement Fall and safety precautions  Continue with 24/7 supportive care at [facility] with ADLs  Encourage a/dequate po hydration and nutrition  PT/OT following  F/u with primary care provider once discharge

## 2022-03-17 NOTE — ASSESSMENT & PLAN NOTE
Recent ECHO showed the aortic valve is trileaflet  Leaflets are severerly thickening  The leaflest are severely calcified with reduced mobility  + for mild to moderate regurgitation   There is critical stenosis  Denies shortness of breath, palpitations or chest pain during examination  F/u with cardiology and cardiac surgery opted for outpatient workup upon discharge

## 2022-03-17 NOTE — ASSESSMENT & PLAN NOTE
History of recurrent cellulitis on BLE  - with bilateral venous stasis, + hyperkeratosis and pappilomatosis  - absence of lymphrorrhea during visit  - the BLE is covered with dry crust and scabs - improved compared last week  - no erythema, no obvious sign of infection, pulse not palpable due to edema  - local wound care with amlactin

## 2022-03-17 NOTE — ASSESSMENT & PLAN NOTE
Most recent hemoglobin 9 5 ---> 9 0, hematrocrit 29 3 -- > 27 9, at baseline  per records review baseline is between 8 1- 10 1  Continue ferrous sulfate 325 mg 2 tabs daily and MVI once a day  No overt bleeding   F/u with primary care provider for management

## 2022-03-17 NOTE — ASSESSMENT & PLAN NOTE
Patient has a h/o recurrent cellulitis on BLE since 2003 with bilateral venous stasis +hyperkeratosis and pappilomatosis  Negative for lymprorrhea per wound care nurse notes  BLE noted dry crust with black and dry blood, scabs and dry blood on BLE  On examination erythema improving since patient is continuing with elevation   Wound following continue with recommendations  Remains afebrile, non toxic appearance and VSS  F/U with wound care center once discharge  Continue to follow up with wound care recommendations and daily wound care  F/U with primary care provider once discharge

## 2022-03-17 NOTE — PATIENT INSTRUCTIONS
Orders Placed This Encounter   Procedures    Wound cleansing and dressings     Wound:  BLE  Discontinue previous wound order  Cleanse the BLE with soap and water, pat dry  Apply amlactin lotion to BLE  Frequency : twice a day and prn for soiling  Elevate BLE when in chair   Offload all wounds  Turn and reposition frequently, maximum of every two hours  Instruct / Assist with weight shifting every 15 - 20 minutes when in chair  Increase protein intake  Monitor for any sign of infection or worsening, inform PCP or patient's primary physician in your facility       Standing Status:   Future     Standing Expiration Date:   3/17/2023

## 2022-03-17 NOTE — ASSESSMENT & PLAN NOTE
Chronic lymphedema  Wound following continue with recommendations  Might benefit from a lymphedema pump and compliance with therapy  Continue to elevate BLE and avoid tight socks   Remains afebrile, non toxic appearance and VSS  F/u with wound care as an outpatient  Continue to f/u with wound care recommendations and daily wound care  Continue to use lymphedema pump

## 2022-03-17 NOTE — ASSESSMENT & PLAN NOTE
Denies pain today  Continue with daily lidocaine patch   Denies b/ LE numbness and tingling  Continue pt/ot for strength and balance training  oob for meals as tolerated   Continue 24/7 supportive care at [facility] with ADLs  F/u with family provider once discharge

## 2022-03-17 NOTE — ASSESSMENT & PLAN NOTE
Blood pressure log reviewed and stable, BP today 127/85  SBP's have been trending between 117s to 155s (3/11 to 3/16)  Continue with midodrine 10 mg tid with hold parameters for systolic blood pressures greater than 130  Avoid hypotension and tight control due to fall risk  Continue to monitor and adjust meds as appropriate  Enctourage low sodium diet   Ensure adequate po hydration   F/u with cardiology upon discharge

## 2022-03-17 NOTE — ASSESSMENT & PLAN NOTE
This is a chronic lymphadema,   - multiple, diffused eschar  -Elevation of BLE  - will benefit from lymphadema pump or therapy

## 2022-03-17 NOTE — ASSESSMENT & PLAN NOTE
Mood stable no behaviors reported  Continue with current medication regimen   Continue with buspar 15 mg bid, escitalopram 10 mg daily, bupropion HCl ER 24 hour 300 mg daily and lorazepam 0 5 at bedtime and PRN dose of 0 5 mg q 8 hours   F/U with primary care provider for management

## 2022-03-17 NOTE — PROGRESS NOTES
[address] MANAGEMENT   AND HYPERBARIC MEDICINE CENTER       Patient ID: Jacek Grande is a 66 y o  female Date of Birth 1950     Location of Service: [facility]    Performed wound round with: Wound team       Chief Complaint   Patient presents with    Follow Up Wound Care Visit     BLE       Wound Instructions:  Orders Placed This Encounter   Procedures    Wound cleansing and dressings     Wound:  BLE  Discontinue previous wound order  Cleanse the BLE with soap and water, pat dry  Apply amlactin lotion to BLE  Frequency : twice a day and prn for soiling  Elevate BLE when in chair   Offload all wounds  Turn and reposition frequently, maximum of every two hours  Instruct / Assist with weight shifting every 15 - 20 minutes when in chair  Increase protein intake  Monitor for any sign of infection or worsening, inform PCP or patient's primary physician in your facility  Standing Status:   Future     Standing Expiration Date:   3/17/2023       Allergies  Aspirin, Cephalosporins, Penicillins, Valium [diazepam], Ciprofloxacin, Sulfa antibiotics, and Vancomycin      Assessment & Plan:  1  Recurrent cellulitis of lower extremity  Assessment & Plan:  History of recurrent cellulitis on BLE  - with bilateral venous stasis, + hyperkeratosis and pappilomatosis  - absence of lymphrorrhea during visit  - the BLE is covered with dry crust and scabs - improved compared last week  - no erythema, no obvious sign of infection, pulse not palpable due to edema  - local wound care with amlactin        2  Lymphedema  Assessment & Plan: This is a chronic lymphadema,   - multiple, diffused eschar  -Elevation of BLE  - will benefit from lymphadema pump or therapy    Orders:  -     Wound cleansing and dressings; Future           Subjective: This is a 66year old female referred to our service for BLE   Patient was referred by Senior Care Team  Patient was seen in collaboration with the facility wound team  As per medical record review, patient have history of recurrent cellulitis on the BLE  She was recently admitted for the same problem and completed a course of antibiotic  Received patient in chair, denies pain  Patient does not have fever  No significant issues related to BLE  Review of Systems   Constitutional: Negative  HENT: Negative  Eyes: Negative  Respiratory: Negative  Cardiovascular: Positive for leg swelling  Gastrointestinal: Negative  Endocrine: Negative  Genitourinary: Negative  Musculoskeletal: Positive for gait problem  Skin: Positive for wound  See HPI   Neurological: Negative for dizziness and headaches  Psychiatric/Behavioral: Negative for behavioral problems  Objective: There were no vitals taken for this visit  Physical Exam  Constitutional:       Appearance: Normal appearance  She is obese  HENT:      Head: Normocephalic and atraumatic  Nose: Nose normal       Mouth/Throat:      Pharynx: Oropharynx is clear  Eyes:      Conjunctiva/sclera: Conjunctivae normal    Cardiovascular:      Rate and Rhythm: Normal rate  Pulmonary:      Effort: Pulmonary effort is normal    Abdominal:      Palpations: Abdomen is soft  Tenderness: There is no abdominal tenderness  There is no guarding  Musculoskeletal:         General: No tenderness  Cervical back: Normal range of motion  Right lower leg: Edema present  Left lower leg: Edema present  Comments: + lymphadema   Skin:     General: Skin is warm  Findings: Lesion present  Comments: BLE - with no open wound, no weeping, no erythema, + hyperkeratosis and pappilomatosis  - absence of lymphrorrhea during visit  - multiple pinpoint scab and eschar  - symmetrical redness on BLE     Neurological:      Mental Status: She is alert and oriented to person, place, and time        Gait: Gait abnormal    Psychiatric:         Mood and Affect: Mood normal          Behavior: Behavior normal               Procedures           Patient's care was coordinated with nursing facility staff  Recent vitals, labs and updated medications were reviewed on EMR or chart system of facility  Past Medical, surgical, social, medication and allergy history and patient's previous records were reviewed and updated as appropriate:     Patient Active Problem List   Diagnosis    Severe sepsis ([facility] )    Depression with anxiety    Recurrent cellulitis of lower extremity    Idiopathic hypotension    Depression    Anemia    Edema    Vitamin D deficiency    Vitamin B12 deficiency    Lymphedema    Chronic venous hypertension (idiopathic) with ulcer of bilateral lower extremity (HCC)    Acute kidney injury ([facility] )    Severe aortic stenosis    Ambulatory dysfunction    History of Clostridium difficile colitis    Chronic back pain    Allergy to multiple antibiotics     Past Medical History:   Diagnosis Date    Anemia     Anxiety     Chronic back pain     Elevated d-dimer 2/16/2022    Elevated troponin 2/16/2022    Hyperkalemia     Hypertension     Hypotension     Lymphedema     BRANDYN (obstructive sleep apnea)     Psychiatric disorder     depression     Past Surgical History:   Procedure Laterality Date    APPENDECTOMY      GASTRIC BYPASS      obesity      Social History     Socioeconomic History    Marital status:       Spouse name: None    Number of children: None    Years of education: None    Highest education level: None   Occupational History    None   Tobacco Use    Smoking status: Never Smoker    Smokeless tobacco: Never Used    Tobacco comment: Never smoker - As per [employer] Vaping Use: Never used   Substance and Sexual Activity    Alcohol use: Not Currently     Comment: Alcohol intake:   None - As per [employer] Drug use: Never     Comment: Illicit drugs:   no - As per [employer] Sexual activity: Not Currently   Other Topics Concern    None   Social History Narrative    · Most recent tobacco use screenin-      · Do you currently or have you served in the [employer]:   No      · Alcohol intake:   None      · Illicit drugs:   no     - As per [employer] [facility]     Financial Resource Strain: Not on file   Food Insecurity: No Food Insecurity    Worried About Running Out of Food in the Last Year: Never true    Harvey of Food in the Last Year: Never true   Transportation Needs: No Transportation Needs    Lack of Transportation (Medical): No    Lack of Transportation (Non-Medical):  No   Physical Activity: Not on file   Stress: Not on file   Social Connections: Not on file   Intimate Partner Violence: Not on file   Housing Stability: Low Risk     Unable to Pay for Housing in the Last Year: No    Number of Places Lived in the Last Year: 1    Unstable Housing in the Last Year: No        Current Outpatient Medications:     acetaminophen (TYLENOL) 325 mg tablet, Take 2 tablets (650 mg total) by mouth every 6 (six) hours as needed for mild pain or moderate pain, Disp: 15 tablet, Rfl: 0    ammonium lactate (LAC-HYDRIN) 12 % lotion, Apply 1 application topically 2 (two) times a day B/l LE, Disp: , Rfl:     buPROPion (WELLBUTRIN XL) 300 mg 24 hr tablet, Take 1 tablet (300 mg total) by mouth daily, Disp: 30 tablet, Rfl: 0    busPIRone (BUSPAR) 15 mg tablet, Take 1 tablet (15 mg total) by mouth 2 (two) times a day, Disp: 60 tablet, Rfl: 0    cholecalciferol (VITAMIN D3) 1,000 units tablet, Take 1,000 Units by mouth daily , Disp: , Rfl:     cyclobenzaprine (FLEXERIL) 5 mg tablet, Take 1 tablet (5 mg total) by mouth 2 (two) times a day as needed for muscle spasms, Disp: 10 tablet, Rfl: 0    docusate sodium (COLACE) 100 mg capsule, Take 100 mg by mouth 2 (two) times a day, Disp: , Rfl:     escitalopram (LEXAPRO) 10 mg tablet, Take 1 tablet (10 mg total) by mouth daily, Disp: 30 tablet, Rfl: 0    ferrous sulfate 325 (65 Fe) mg tablet, Take 2 tablets (650 mg total) by mouth daily with breakfast, Disp: 60 tablet, Rfl: 0    lidocaine (LIDODERM) 5 %, Apply 1 patch topically daily Remove & Discard patch within 12 hours or as directed by MD, Disp: 5 patch, Rfl: 2    LORazepam (ATIVAN) 0 5 mg tablet, Take 0 5 mg by mouth daily at bedtime, Disp: , Rfl:     LORazepam (ATIVAN) 0 5 mg tablet, Take 0 5 mg by mouth every 8 (eight) hours as needed anxiety, Disp: , Rfl:     midodrine (PROAMATINE) 10 MG tablet, Take 1 tablet PO TID, hold for SBP >/= 130, Disp: 90 tablet, Rfl: 0    multivitamin (THERAGRAN) TABS, Take 1 tablet by mouth daily, Disp: , Rfl:   Family History   Family history unknown: Yes              Coordination of Care: Wound team aware of the treatment plan  Facility nurse will provide wound treatment and monitor the wound for any changes  Patient / Staff education : Patient / Staff was given education on sign of infection and pressure ulcer prevention  Patient/ Staff verbalized understanding     Follow up :  Return in about 1 week (around 3/23/2022)  Voice-recognition software may have been used in the preparation of this document  Occasional wrong word or "sound-alike" substitutions may have occurred due to the inherent limitations of voice recognition software  Interpretation should be guided by context        ROSCOE Salinas

## 2022-03-17 NOTE — ASSESSMENT & PLAN NOTE
Continue with buspar 15 mg bid, escitalopram 10 mg daily, bupropion HCl ER 24 hour 300 mg daily and lorazepam 0 5 at bedtime and PRN dose of 0 5 mg q 8 hours   F/U with primary care provider for management

## 2022-10-11 PROBLEM — A41.9 SEVERE SEPSIS (HCC): Status: RESOLVED | Noted: 2020-07-10 | Resolved: 2022-10-11

## 2022-10-11 PROBLEM — R65.20 SEVERE SEPSIS (HCC): Status: RESOLVED | Noted: 2020-07-10 | Resolved: 2022-10-11

## 2023-08-29 ENCOUNTER — TELEPHONE (OUTPATIENT)
Age: 73
End: 2023-08-29

## 2023-08-29 NOTE — TELEPHONE ENCOUNTER
[facility] called to set up a colonoscopy for this pt, screening questions were needed so an outreach was made to the pt directly. Unable to reach pt or leave message.

## 2023-09-13 ENCOUNTER — TELEPHONE (OUTPATIENT)
Age: 73
End: 2023-09-13

## 2023-09-13 ENCOUNTER — PREP FOR PROCEDURE (OUTPATIENT)
Age: 73
End: 2023-09-13

## 2023-09-13 NOTE — TELEPHONE ENCOUNTER
09/13/23  Screened by: Thelma Baig     Referring Provider     Pre- Screening: There is no height or weight on file to calculate BMI.  4'11.75  141 lbs  BMI 27.8  Has patient been referred for a routine screening Colonoscopy? yes  Is the patient between 43-73 years old? yes      Previous Colonoscopy no   If yes:    Date:     Facility:     Reason:       SCHEDULING STAFF: If the patient is between 45yrs-49yrs, please advise patient to confirm benefits/coverage with their insurance company for a routine screening colonoscopy, some insurance carriers will only cover at [address] [place] [address] or older. If the patient is over 66years old, please schedule an office visit. Does the patient want to see a Gastroenterologist prior to their procedure OR are they having any GI symptoms? Yes - wants to see provider prior    Has the patient been hospitalized or had abdominal surgery in the past 6 months? no    Does the patient use supplemental oxygen? no    Does the patient take Coumadin, Lovenox, Plavix, Elliquis, Xarelto, or other blood thinning medication? no    Has the patient had a stroke, cardiac event, or stent placed in the past year? no     Patient failed OA     SCHEDULING STAFF: If patient answers NO to above questions, then schedule procedure. If patient answers YES to above questions, then schedule office appointment. If patient is between 45yrs - 49yrs, please advise patient that we will have to confirm benefits & coverage with their insurance company for a routine screening colonoscopy.

## 2024-07-01 ENCOUNTER — TELEPHONE (OUTPATIENT)
Dept: SURGERY | Facility: CLINIC | Age: 74
End: 2024-07-01

## 2024-10-07 NOTE — ASSESSMENT & PLAN NOTE
Rate controlled on Toprol  Anticoagulated on eliquis  Monitor on telemetry   · History of severe aortic stenosis  · Previous Echo (5/2021): Ef 55% with Grade 1 diastolic dysfunction and Severe Aortic stenosis  · Repeat ECHO demonstrates EF 60% with critical AS  · Monitor I/O's and volume status  · Daily weights     · Ambulatory referral for cardiac surgery placed